# Patient Record
Sex: FEMALE | Race: WHITE | NOT HISPANIC OR LATINO | Employment: OTHER | URBAN - METROPOLITAN AREA
[De-identification: names, ages, dates, MRNs, and addresses within clinical notes are randomized per-mention and may not be internally consistent; named-entity substitution may affect disease eponyms.]

---

## 2019-03-05 LAB
EXTERNAL HIV SCREEN: NORMAL
HCV AB SER-ACNC: NEGATIVE

## 2019-09-12 ENCOUNTER — TELEPHONE (OUTPATIENT)
Dept: NEUROLOGY | Facility: CLINIC | Age: 44
End: 2019-09-12

## 2019-09-13 ENCOUNTER — TRANSCRIBE ORDERS (OUTPATIENT)
Dept: NEUROLOGY | Facility: CLINIC | Age: 44
End: 2019-09-13

## 2019-09-13 DIAGNOSIS — G43.919 INTRACTABLE MIGRAINE WITHOUT STATUS MIGRAINOSUS: Primary | ICD-10-CM

## 2019-09-16 ENCOUNTER — HOSPITAL ENCOUNTER (EMERGENCY)
Facility: HOSPITAL | Age: 44
Discharge: HOME | End: 2019-09-16
Attending: EMERGENCY MEDICINE | Admitting: EMERGENCY MEDICINE
Payer: COMMERCIAL

## 2019-09-16 ENCOUNTER — APPOINTMENT (EMERGENCY)
Dept: RADIOLOGY | Facility: HOSPITAL | Age: 44
End: 2019-09-16
Payer: COMMERCIAL

## 2019-09-16 VITALS
HEIGHT: 63 IN | WEIGHT: 170.86 LBS | RESPIRATION RATE: 15 BRPM | BODY MASS INDEX: 30.27 KG/M2 | HEART RATE: 66 BPM | TEMPERATURE: 98.4 F | SYSTOLIC BLOOD PRESSURE: 92 MMHG | DIASTOLIC BLOOD PRESSURE: 53 MMHG | OXYGEN SATURATION: 98 %

## 2019-09-16 DIAGNOSIS — M54.9 BACK PAIN: ICD-10-CM

## 2019-09-16 DIAGNOSIS — G43.909 MIGRAINE: Primary | ICD-10-CM

## 2019-09-16 LAB
EXT PREG TEST URINE: NEGATIVE
EXT. CONTROL ED NAV: NORMAL

## 2019-09-16 PROCEDURE — 99284 EMERGENCY DEPT VISIT MOD MDM: CPT | Performed by: EMERGENCY MEDICINE

## 2019-09-16 PROCEDURE — 72100 X-RAY EXAM L-S SPINE 2/3 VWS: CPT

## 2019-09-16 PROCEDURE — 81025 URINE PREGNANCY TEST: CPT | Performed by: EMERGENCY MEDICINE

## 2019-09-16 PROCEDURE — 99283 EMERGENCY DEPT VISIT LOW MDM: CPT

## 2019-09-16 PROCEDURE — 72070 X-RAY EXAM THORAC SPINE 2VWS: CPT

## 2019-09-16 PROCEDURE — 96374 THER/PROPH/DIAG INJ IV PUSH: CPT

## 2019-09-16 PROCEDURE — 96375 TX/PRO/DX INJ NEW DRUG ADDON: CPT

## 2019-09-16 PROCEDURE — 72050 X-RAY EXAM NECK SPINE 4/5VWS: CPT

## 2019-09-16 RX ORDER — DIPHENHYDRAMINE HYDROCHLORIDE 50 MG/ML
25 INJECTION INTRAMUSCULAR; INTRAVENOUS ONCE
Status: COMPLETED | OUTPATIENT
Start: 2019-09-16 | End: 2019-09-16

## 2019-09-16 RX ORDER — KETOROLAC TROMETHAMINE 30 MG/ML
15 INJECTION, SOLUTION INTRAMUSCULAR; INTRAVENOUS ONCE
Status: COMPLETED | OUTPATIENT
Start: 2019-09-16 | End: 2019-09-16

## 2019-09-16 RX ORDER — METOCLOPRAMIDE HYDROCHLORIDE 5 MG/ML
10 INJECTION INTRAMUSCULAR; INTRAVENOUS ONCE
Status: COMPLETED | OUTPATIENT
Start: 2019-09-16 | End: 2019-09-16

## 2019-09-16 RX ORDER — CYCLOBENZAPRINE HCL 10 MG
10 TABLET ORAL 2 TIMES DAILY PRN
Qty: 20 TABLET | Refills: 0 | Status: SHIPPED | OUTPATIENT
Start: 2019-09-16 | End: 2019-09-27

## 2019-09-16 RX ORDER — NAPROXEN 500 MG/1
500 TABLET ORAL 2 TIMES DAILY WITH MEALS
Qty: 30 TABLET | Refills: 0 | Status: SHIPPED | OUTPATIENT
Start: 2019-09-16 | End: 2019-09-27

## 2019-09-16 RX ADMIN — KETOROLAC TROMETHAMINE 15 MG: 30 INJECTION, SOLUTION INTRAMUSCULAR at 09:36

## 2019-09-16 RX ADMIN — METOCLOPRAMIDE 10 MG: 5 INJECTION, SOLUTION INTRAMUSCULAR; INTRAVENOUS at 09:38

## 2019-09-16 RX ADMIN — DIPHENHYDRAMINE HYDROCHLORIDE 25 MG: 50 INJECTION, SOLUTION INTRAMUSCULAR; INTRAVENOUS at 09:35

## 2019-09-16 NOTE — DISCHARGE INSTRUCTIONS
Follow up primary care and Neurology, take medications as prescribed for pain/muscle spasm, return to ED for severe or worsening pain, bowel or bladder incontinence, numbness in genital area, inability to walk

## 2019-09-16 NOTE — ED PROVIDER NOTES
History  Chief Complaint   Patient presents with    Back Pain     pt c/o back pain since friday 09/13  also c/o migraine states that she has a hx of migraines and degenerative disc disease     HPI  78-year-old female presents back pain for the past week in addition to a migraine headache  She states she has history degenerative disc disease, chronic back pain and was following up with a neurologist, had MRIs done which were unremarkable  States that she has pain on the left side of her back that radiates down her left leg  No weakness, numbness, saddle anesthesia, bowel or bladder dysfunction  No fevers, chills, IV drug use  No trauma  Has tried turmeric/ibuprofen for her headache/back pain without relief  States that migraine feels like prior migraines with sharp stabbing pain in head, photophobia and nausea  None       Past Medical History:   Diagnosis Date    Bulging lumbar disc     Degenerative disc disease, cervical     pt states entire back    IBS (irritable bowel syndrome)     Migraine     Psychiatric disorder     depression anxiety       Past Surgical History:   Procedure Laterality Date    SHOULDER SURGERY Right        History reviewed  No pertinent family history  I have reviewed and agree with the history as documented  Social History     Tobacco Use    Smoking status: Current Every Day Smoker     Packs/day: 0 50     Types: Cigarettes    Smokeless tobacco: Never Used   Substance Use Topics    Alcohol use: Not Currently    Drug use: Not Currently        Review of Systems   Constitutional: Negative for chills and fever  HENT: Negative for dental problem and ear pain  Eyes: Negative for pain and redness  Respiratory: Negative for cough and shortness of breath  Cardiovascular: Negative for chest pain and palpitations  Gastrointestinal: Negative for abdominal pain and nausea  Endocrine: Negative for polydipsia and polyphagia     Genitourinary: Negative for dysuria and frequency  Musculoskeletal: Positive for back pain  Negative for arthralgias and joint swelling  Skin: Negative for color change and rash  Neurological: Positive for headaches  Negative for dizziness  Psychiatric/Behavioral: Negative for behavioral problems and confusion  All other systems reviewed and are negative  Physical Exam  Physical Exam   Constitutional: She is oriented to person, place, and time  She appears well-developed and well-nourished  No distress  HENT:   Head: Atraumatic  Right Ear: External ear normal    Left Ear: External ear normal    Nose: Nose normal    Eyes: Pupils are equal, round, and reactive to light  Conjunctivae and EOM are normal    Neck: Normal range of motion  Neck supple  No JVD present  Cardiovascular: Normal rate, regular rhythm and normal heart sounds  No murmur heard  Pulmonary/Chest: Effort normal and breath sounds normal  No respiratory distress  She has no wheezes  Abdominal: Soft  Bowel sounds are normal  She exhibits no distension  There is no tenderness  Musculoskeletal: Normal range of motion  She exhibits no edema  TTP left lumbar paraspinal, +SLR on left, no midline TTP, normal strength and sensation in bilateral lower extremities   Neurological: She is alert and oriented to person, place, and time  No cranial nerve deficit  CN II-XII intact grossly, no focal deficits  Normal strength and sensation in b/l upper and lower extremities  Normal FNF and rapid alternating hand movements   Skin: Skin is warm and dry  Capillary refill takes less than 2 seconds  She is not diaphoretic  Psychiatric: She has a normal mood and affect  Her behavior is normal    Nursing note and vitals reviewed        Vital Signs  ED Triage Vitals [09/16/19 0846]   Temperature Pulse Respirations Blood Pressure SpO2   98 4 °F (36 9 °C) 90 16 116/62 100 %      Temp Source Heart Rate Source Patient Position - Orthostatic VS BP Location FiO2 (%)   Oral Monitor Sitting Right arm --      Pain Score       Worst Possible Pain           Vitals:    09/16/19 0846 09/16/19 0958 09/16/19 1033   BP: 116/62 99/58 92/53   Pulse: 90 70 66   Patient Position - Orthostatic VS: Sitting Sitting Lying         Visual Acuity  Visual Acuity      Most Recent Value   L Pupil Size (mm)  4   R Pupil Size (mm)  4          ED Medications  Medications   metoclopramide (REGLAN) injection 10 mg (10 mg Intravenous Given 9/16/19 0938)   diphenhydrAMINE (BENADRYL) injection 25 mg (25 mg Intravenous Given 9/16/19 0935)   ketorolac (TORADOL) injection 15 mg (15 mg Intravenous Given 9/16/19 0936)       Diagnostic Studies  Results Reviewed     Procedure Component Value Units Date/Time    POCT pregnancy, urine [[id number]]  (Normal) Resulted:  09/16/19 0916    Lab Status:  Final result Updated:  09/16/19 0916     EXT PREG TEST UR (Ref: Negative) negative     Control valid                 XR spine thoracic 2 views   Final Result by Lavonne Newton MD (09/16 1000)      No acute osseous abnormality  Workstation performed: [id number]         XR spine lumbar 2 or 3 views injury   Final Result by Lavonne Newton MD (09/16 1002)      No acute osseous abnormality  Degenerative changes as described  Workstation performed: [id number]         XR spine cervical complete 4 or 5 vw non injury   Final Result by Lavonne Newton MD (09/16 1000)      No radiographic evidence of cervical spine fracture or traumatic malalignment  Straightening of the normal cervical lordosis, which may reflect muscle spasm versus positioning  Mild disc degenerative changes at C5-6  Workstation performed: [id number]                    Procedures  Procedures       ED Course                               MDM  41-year-old female past medical history of migraine headaches in addition to chronic back pain presents headache and back pain    Migraine feels like prior migraines, normal neuro exam, no red flags in history, improved with migraine cocktail  Patient requesting x-rays of her entire spine which are negative  No red flags in history, no numbness, weakness, saddle anesthesia, fevers  Will refer for our primary doctor, Neurology, will Rx naproxen and Flexeril  Disposition  Final diagnoses:   Migraine   Back pain     Time reflects when diagnosis was documented in both MDM as applicable and the Disposition within this note     Time User Action Codes Description Comment    9/16/2019 10:32 AM Maritza Jose [A76 443] Migraine     9/16/2019 10:32 AM Denia Ky Noemí [M54 9] Back pain       ED Disposition     ED Disposition Condition Date/Time Comment    Discharge Stable Mon Sep 16, 2019 10:31 AM Nadira discharge to home/self care  Follow-up Information     Follow up With Specialties Details Why Contact Info    Alex Darling MD Internal Medicine Call  for primary care doctor [phone] Neurology Call  for neurologist [address] [place] [phone]            Discharge Medication List as of 9/16/2019 10:34 AM      START taking these medications    Details   cyclobenzaprine (FLEXERIL) 10 mg tablet Take 1 tablet (10 mg total) by mouth 2 (two) times a day as needed for muscle spasms, Starting Mon 9/16/2019, Print      naproxen (NAPROSYN) 500 mg tablet Take 1 tablet (500 mg total) by mouth 2 (two) times a day with meals, Starting Mon 9/16/2019, Print           No discharge procedures on file      ED Provider  Electronically Signed by           Jackie Santos MD  09/16/19 7582

## 2019-09-27 ENCOUNTER — CONSULT (OUTPATIENT)
Dept: PAIN MEDICINE | Facility: CLINIC | Age: 44
End: 2019-09-27
Payer: COMMERCIAL

## 2019-09-27 VITALS
HEART RATE: 76 BPM | DIASTOLIC BLOOD PRESSURE: 68 MMHG | WEIGHT: 164 LBS | HEIGHT: 63 IN | RESPIRATION RATE: 16 BRPM | BODY MASS INDEX: 29.06 KG/M2 | SYSTOLIC BLOOD PRESSURE: 114 MMHG

## 2019-09-27 DIAGNOSIS — M50.120 CERVICAL DISC DISORDER WITH RADICULOPATHY OF MID-CERVICAL REGION: ICD-10-CM

## 2019-09-27 DIAGNOSIS — M51.16 LUMBAR DISC DISEASE WITH RADICULOPATHY: Primary | ICD-10-CM

## 2019-09-27 DIAGNOSIS — M51.26 LUMBAR DISC HERNIATION: ICD-10-CM

## 2019-09-27 PROCEDURE — 99204 OFFICE O/P NEW MOD 45 MIN: CPT | Performed by: ANESTHESIOLOGY

## 2019-09-27 RX ORDER — ALPRAZOLAM 2 MG/1
TABLET ORAL 3 TIMES DAILY PRN
COMMUNITY

## 2019-09-27 RX ORDER — FLUOXETINE HYDROCHLORIDE 20 MG/1
40 CAPSULE ORAL DAILY
COMMUNITY

## 2019-09-27 RX ORDER — IBUPROFEN 200 MG
200 TABLET ORAL DAILY PRN
COMMUNITY
End: 2020-10-23 | Stop reason: SDUPTHER

## 2019-09-27 RX ORDER — PROPRANOLOL/HYDROCHLOROTHIAZID 40 MG-25MG
1500 TABLET ORAL 2 TIMES DAILY
COMMUNITY
End: 2020-03-31 | Stop reason: ALTCHOICE

## 2019-09-27 NOTE — PROGRESS NOTES
Assessment:  1  Lumbar disc disease with radiculopathy  Ambulatory referral to Physical Therapy   2  Lumbar disc herniation  Ambulatory referral to Physical Therapy   3  Cervical disc disorder with radiculopathy of mid-cervical region  Ambulatory referral to Physical Therapy     Plan  This is a 39-year-old female who presents with neck and low back pain  On physical examination, there is no gross motor deficits appreciated  The patient demonstrated minimal affect on  Muscle strength testing  Sensory testing is intact  Her MRI was reviewed with her in detail and all her questions were answered to her satisfaction  Today, we discussed options for pain management  I informed patient that we need to implement a comprehensive approach utilizing physical therapy/water aerobics  In addition, NSAIDs, muscle relaxants, membrane stabilizing agents and alternative regimen should be considered  Patient is currently on ibuprofen as needed which she will continue for inflammation  Regarding muscle relaxant, she was given muscle relaxant in the past and she reports that it made her feel sick  Will hold off on any additional muscle relaxant for now  I will send her to physical therapy /water aerobics 2 to 3 times a week for 6 weeks duration  Upon completion of water aerobics, will see her back in the office in 6 weeks for reassessment  If she continues to complain of neck pain or low back pain with radicular symptoms, I will consider performing a lumbar versus cervical epidural steroid injection  Patient is hesitant regarding injection but she will review it accordingly  I will discuss further next office visit  I provided patient of names of alternative regimen for headaches  These include butterbur, feverfew and Riboflavin supplements  My impressions and treatment recommendations were discussed in detail with the patient who verbalized understanding and had no further questions    Discharge instructions were provided  I personally saw and examined the patient and I agree with the above discussed plan of care  New Medications Ordered This Visit   Medications    Turmeric 500 MG CAPS     Sig: Take by mouth    ibuprofen (MOTRIN) 200 mg tablet     Sig: Take 200 mg by mouth every 6 (six) hours as needed    FLUoxetine (PROzac) 20 mg capsule     Sig: Take 20 mg by mouth daily    ALPRAZolam (XANAX) 2 MG tablet     Sig: Take by mouth daily at bedtime as needed for anxiety       History of Present Illness    Heriberto Seay is a 38 y o  female who presents today for initial consultation regarding neck and low back pain  Of note, patient reports severe pain which she rates 8-10/10 on the pain scale  Her pain is constant, with no typical pattern  Patient further describes pain as burning, cramping, shooting, numbness with pins and needles sensation  She reports neck pain which radiates down her arms bilaterally  She reports low back pain which radiates across the low back and down the hips and down the legs bilaterally  Of note, patient takes ibuprofen 200 mg as needed and tumeric  She recently had a cervical and the lumbosacral spine MRI which demonstrates multilevel disc herniations as well as foraminal narrowing  She reports headaches, dizziness, joint pain and muscle pain  Patient denies bladder and or bowel issues  She denies fever or chills  She has had physical therapy as well as exercise regimen without relief of pain  Of note, patient was seen a neurologist in [place] and was treated for her symptoms  She reports pain on gabapentin in the past without efficacy  She was weaned off accordingly  In addition, patient reports a family history of multiple sclerosis as well as ALS  She states that she is negative for both conditions      I have personally reviewed and/or updated the patient's past medical history, past surgical history, family history, social history, current medications, allergies, and vital signs today  Review of Systems   Constitutional: Negative for fever and unexpected weight change  HENT: Positive for sore throat  Negative for trouble swallowing  Eyes: Positive for pain and visual disturbance  Respiratory: Positive for cough  Negative for shortness of breath and wheezing  Cardiovascular: Negative for chest pain and palpitations  Gastrointestinal: Negative for constipation, diarrhea, nausea and vomiting  Endocrine: Negative for cold intolerance, heat intolerance and polydipsia  Genitourinary: Negative for difficulty urinating and frequency  Musculoskeletal: Positive for arthralgias, back pain, myalgias, neck pain and neck stiffness  Negative for gait problem and joint swelling  Skin: Negative for rash  Neurological: Positive for numbness and headaches  Negative for dizziness, seizures, syncope and weakness  Hematological: Does not bruise/bleed easily  Psychiatric/Behavioral: Positive for dysphoric mood  The patient is nervous/anxious  All other systems reviewed and are negative  Past Medical History:   Diagnosis Date    Bulging lumbar disc     Degenerative disc disease, cervical     pt states entire back    IBS (irritable bowel syndrome)     Migraine     Psychiatric disorder     depression anxiety       Past Surgical History:   Procedure Laterality Date    SHOULDER SURGERY Right        No family history on file      Social History     Occupational History    Not on file   Tobacco Use    Smoking status: Current Every Day Smoker     Packs/day: 0 50     Types: Cigarettes    Smokeless tobacco: Never Used   Substance and Sexual Activity    Alcohol use: Not Currently    Drug use: Not Currently    Sexual activity: Not on file       Current Outpatient Medications on File Prior to Visit   Medication Sig    ALPRAZolam (XANAX) 2 MG tablet Take by mouth daily at bedtime as needed for anxiety    FLUoxetine (PROzac) 20 mg capsule Take 20 mg by mouth daily    ibuprofen (MOTRIN) 200 mg tablet Take 200 mg by mouth every 6 (six) hours as needed    Turmeric 500 MG CAPS Take by mouth    [DISCONTINUED] cyclobenzaprine (FLEXERIL) 10 mg tablet Take 1 tablet (10 mg total) by mouth 2 (two) times a day as needed for muscle spasms    [DISCONTINUED] naproxen (NAPROSYN) 500 mg tablet Take 1 tablet (500 mg total) by mouth 2 (two) times a day with meals     No current facility-administered medications on file prior to visit  No Known Allergies    Physical Exam    /68   Pulse 76   Resp 16   Ht 5' 3" (1 6 m)   Wt 74 4 kg (164 lb)   BMI 29 05 kg/m²     Constitutional: normal, well developed, well nourished, alert, in no distress and non-toxic and no overt pain behavior  Eyes: anicteric  HEENT: grossly intact  Neck: supple, symmetric, trachea midline and no masses   Pulmonary:even and unlabored  Cardiovascular:No edema or pitting edema present  Skin:Normal without rashes or lesions and well hydrated  Psychiatric:Mood and affect appropriate  Neurologic:Cranial Nerves II-XII grossly intact  Musculoskeletal:  SLOW GAIT, difficulty getting up from a seated position      Cervical Spine Exam    Appearance:  Normal lordosis  Palpation/Tenderness:  left cervical paraspinal tenderness  right cervical paraspinal tenderness  Sensory:  no sensory deficits noted  Range of Motion:  Full range of motion with no pain or limitations in flexion, extension, lateral flexion and rotation  Motor Strength:  Left    5/5  Right   5/5  Reflexes:  Left Triceps:  2+   Right Triceps:  2+     Lumbar Spine Exam    Appearance:  Normal lordosis  Palpation/Tenderness:  left lumbar paraspinal tenderness  right lumbar paraspinal tenderness  Sensory:  no sensory deficits noted  Range of Motion:  Full range of motion with no pain or limitations in flexion, extension, lateral flexion and rotation  Motor Strength:  Left foot dorsiflexion:  5/5  Left foot plantar flexion:  5/5  Right foot dorsiflexion:  5/5  Right foot plantar flexion:  5/5  Reflexes:  Left Patellar:  2+   Right Patellar:  2+     Imaging

## 2019-10-08 ENCOUNTER — EVALUATION (OUTPATIENT)
Dept: PHYSICAL THERAPY | Age: 44
End: 2019-10-08
Payer: COMMERCIAL

## 2019-10-08 DIAGNOSIS — M54.12 CERVICAL RADICULOPATHY: Primary | ICD-10-CM

## 2019-10-08 DIAGNOSIS — M54.16 LUMBAR RADICULOPATHY: ICD-10-CM

## 2019-10-08 PROCEDURE — 97113 AQUATIC THERAPY/EXERCISES: CPT | Performed by: PHYSICAL THERAPIST

## 2019-10-08 PROCEDURE — G8979 MOBILITY GOAL STATUS: HCPCS | Performed by: PHYSICAL THERAPIST

## 2019-10-08 PROCEDURE — G8978 MOBILITY CURRENT STATUS: HCPCS | Performed by: PHYSICAL THERAPIST

## 2019-10-08 PROCEDURE — 97162 PT EVAL MOD COMPLEX 30 MIN: CPT | Performed by: PHYSICAL THERAPIST

## 2019-10-08 NOTE — PROGRESS NOTES
PT Evaluation     Today's date: 10/8/2019  Patient name: Elvi Chaudhary  : 1975  MRN: [id number]  Referring provider: Barbara Rivero MD  Dx:   Encounter Diagnosis     ICD-10-CM    1  Cervical radiculopathy M54 12    2  Lumbar radiculopathy M54 16                   Assessment  Assessment details: Elvi Chaudhary is a 38 y o  female who presents with pain, decreased strength, decreased ROM, ambulatory dysfunction and postural  dysfunction  Due to these impairments, Patient has difficulty performing a/iadls  Patient's clinical presentation is consistent with their referring diagnosis of back and neck pain  Patient would benefit from skilled physical therapy to address their aforementioned impairments, improve their level of function and to improve their overall quality of life  Impairments: abnormal gait, abnormal muscle tone, abnormal or restricted ROM, abnormal movement, activity intolerance, impaired balance, impaired physical strength, lacks appropriate home exercise program, pain with function, weight-bearing intolerance, poor posture  and poor body mechanics  Understanding of Dx/Px/POC: good   Prognosis: fair    Goals  ST-3 WEEKS  1  Decrease pain by 2 points on VAS at its worst   2   Increase ROM by > 5 deg in all deficients planes  3   Increase UE/CORE/LE by 1/2 MMT grade in all deficient planes  LT-6 WEEKS  1  Patient to be independent with a/iadls  2  Increase functional activities for leisure and home activities to previous LOF    3  Independent with HEP and/or fitness program     Plan  Patient would benefit from: skilled physical therapy  Planned modality interventions: cryotherapy, electrical stimulation/Russian stimulation, thermotherapy: hydrocollator packs and unattended electrical stimulation  Planned therapy interventions: activity modification, behavior modification, body mechanics training, aquatic therapy, flexibility, functional ROM exercises, home exercise program, IADL retraining, joint mobilization, manual therapy, neuromuscular re-education, patient education, postural training, strengthening, stretching, therapeutic activities and therapeutic exercise  Frequency: 2x week (2-3x week)  Duration in weeks: 12  Treatment plan discussed with: patient        Subjective Evaluation    History of Present Illness  Date of onset: 2019  Mechanism of injury: exacerbated back while rolling in bed, complains of back and leg pain as well as neck pain  Quality of life: good    Pain  Current pain ratin  At best pain ratin  At worst pain ratin  Quality: sharp, tight, knife-like and throbbing  Relieving factors: medications and rest  Aggravating factors: walking, sitting and standing  Progression: no change    Social Support  Steps to enter house: yes  L[facility] in: [facility]  Lives with: spouse      Diagnostic Tests  X-ray: abnormal  MRI studies: abnormal  Treatments  Previous treatment: massage and medication  Patient Goals  Patient goals for therapy: decreased pain, increased motion, increased strength and independence with ADLs/IADLs          Objective     Static Posture     Head  Forward  Thoracic Spine  Hyperkyphosis  Lumbar Spine   Increased lordosis  Palpation   Left   Hypertonic in the erector spinae and upper trapezius  Right   Hypertonic in the erector spinae and upper trapezius  Tenderness     Lumbar Spine  Tenderness in the spinous process       Active Range of Motion   Cervical/Thoracic Spine       Cervical    Flexion: 30 degrees   Extension: 55 degrees      Left lateral flexion: 20 degrees      Right lateral flexion: 20 degrees      Left rotation: 55 degrees  Right rotation: 60 degrees           Lumbar   Flexion: 50 degrees   Extension: 20 degrees     Strength/Myotome Testing     Left Shoulder     Planes of Motion   Flexion: 4-   Extension: 4-   Abduction: 4-   Adduction: 4   External rotation at 0°: 4-   Internal rotation at 0°: 4     Right Shoulder     Planes of Motion   Flexion: 4   Extension: 4   Abduction: 4   Adduction: 4   External rotation at 0°: 4   Internal rotation at 0°: 4     Left Elbow   Flexion: 3+  Extension: 4-    Right Elbow   Flexion: 4  Extension: 4    Left Hip   Planes of Motion   Flexion: 4-  Extension: 4-  Abduction: 4-  Adduction: 4    Right Hip   Planes of Motion   Flexion: 4  Extension: 4  Abduction: 4  Adduction: 4+    Left Knee   Flexion: 4  Extension: 4-    Right Knee   Flexion: 4+  Extension: 4    Left Ankle/Foot   Dorsiflexion: 3+  Plantar flexion: 4-  Inversion: 3+  Eversion: 3+    Right Ankle/Foot   Dorsiflexion: 4  Plantar flexion: 4  Inversion: 4  Eversion: 4    Additional Strength Details  CORE is 3+/5    Tests   Cervical     Left   Positive Spurling's Test A  Lumbar     Left   Positive passive SLR and slump test      Ambulation     Observational Gait   Gait: antalgic, asymmetric and crouched   Decreased walking speed, stride length and left stance time       Functional Assessment        Single Leg Stance   Left: 5 seconds  Right: 10 seconds      Flowsheet Rows      Most Recent Value   PT/OT G-Codes   Current Score  3   Projected Score  36   Assessment Type  Evaluation   G code set  Mobility: Walking & Moving Around   Mobility: Walking and Moving Around Current Status ()  CK   Mobility: Walking and Moving Around Goal Status ()  CJ            Precautions: neck, back pain, depression    Daily Treatment Diary     Manual                                                                                   Exercise Diary  10/8            Water walking 5            Postural training             Gait training             Home exercise pgm/patient education             Wall: t/h raises 1            Hip abd/add 2            Marching 1            squats 1            Knee flex/ext             Step-ups (fwd/bkwd/ss)             SLS (eyes open/closed)             SLS w UE mvmt  AROM/ball toss             Weight shifting UE Noodle work x 4  5            UE AROM             Resistive UE work (paddles, bells, TB)             Core work on noodle (sitting/stdg)             Sit on noodle with movement             Seated on pool bench w proper posture             Ankle df/pf             marching             Hip Ab/add             Knee flex/ext             Deep water mvmt             Deep water tx/stretching 5            Specific self - stretches wall/steps 5                Modalities              whirlpool 5

## 2019-10-10 ENCOUNTER — OFFICE VISIT (OUTPATIENT)
Dept: PHYSICAL THERAPY | Age: 44
End: 2019-10-10
Payer: COMMERCIAL

## 2019-10-10 DIAGNOSIS — M54.12 CERVICAL RADICULOPATHY: Primary | ICD-10-CM

## 2019-10-10 DIAGNOSIS — M54.16 LUMBAR RADICULOPATHY: ICD-10-CM

## 2019-10-10 PROCEDURE — 97113 AQUATIC THERAPY/EXERCISES: CPT

## 2019-10-10 NOTE — PROGRESS NOTES
Daily Note     Today's date: 10/10/2019  Patient name: Archie Rubalcava  : 1975  MRN: [id number]  Referring provider: Susan Young MD  Dx:   Encounter Diagnosis     ICD-10-CM    1  Cervical radiculopathy M54 12    2  Lumbar radiculopathy M54 16        Start Time: 1400  Stop Time: 1500  Total time in clinic (min): 60 minutes    Subjective: pt notes she was really sore and had a lot of pain after initial session  Today she notes pain -8/10  Objective: See treatment diary below  BP taken after session today 121/      Assessment: Tolerated treatment fair  Slow movements while in the pool today  Focused on breathing and posture throughout session  Added UE AROM w/ deep breathing and relaxation  Also added seated ex's for LE's  Pt did have decreased pain while in the water today  Pt educated on pool safety, pool rules and expectations  Pt had a good understanding of all rules and safety guidelines  Patient would benefit from continued PT      Plan: Continue per plan of care        Precautions: neck, back pain, depression    Daily Treatment Diary       Exercise Diary  10/8 10/10           Water walking 5 10           Postural training             Gait training             Home exercise pgm/patient education  5'           Wall: t/h raises 1 1           Hip abd/add 2 2            1           squats 1            Knee flex/ext  1           Step-ups (fwd/bkwd/ss)             SLS (eyes open/closed)             SLS w UE mvmt  AROM/ball toss             Weight shifting             UE Noodle work x 4  5 nt           UE AROM w/ breathing  5'           Resistive UE work (paddles, bells, TB)             Core work on noodle (sitting/stdg)             Sit on noodle with movement             Seated on pool bench w proper posture  2           Ankle df/pf  1                      Hip Ab/add  1           Knee flex/ext  1           Deep water mvmt             Deep water tx/stretching 5 10 Specific self - stretches wall/steps 5 3'               Modalities   10/10           whirlpool 5 10

## 2019-10-15 ENCOUNTER — OFFICE VISIT (OUTPATIENT)
Dept: PHYSICAL THERAPY | Age: 44
End: 2019-10-15
Payer: COMMERCIAL

## 2019-10-15 DIAGNOSIS — M54.16 LUMBAR RADICULOPATHY: ICD-10-CM

## 2019-10-15 DIAGNOSIS — M54.12 CERVICAL RADICULOPATHY: Primary | ICD-10-CM

## 2019-10-15 PROCEDURE — 97113 AQUATIC THERAPY/EXERCISES: CPT | Performed by: PHYSICAL THERAPIST

## 2019-10-15 NOTE — PROGRESS NOTES
Daily Note     Today's date: 10/15/2019  Patient name: Maik Land  : 1975  MRN: [id number]  Referring provider: Saman Drake MD  Dx:   Encounter Diagnosis     ICD-10-CM    1  Cervical radiculopathy M54 12    2  Lumbar radiculopathy M54 16        Start Time: 1300  Stop Time: 1357  Total time in clinic (min): 57 minutes    Subjective: Patient complains of HA+ and back pain -7/10      Objective: See treatment diary below      Assessment: Tolerated treatment well  Patient demonstrated fatigue post treatment, exhibited good technique with therapeutic exercises and would benefit from continued PT, guarded with all movements but notes decreased pain with DEWTX      Plan: Progress treatment as tolerated         Precautions: neck, back pain, depression    Daily Treatment Diary       Exercise Diary  10/8 10/10 10/15          Water walking 5 10 10          Postural training             Gait training             Home exercise pgm/patient education  5'           Wall: t/h raises 1 1 1          Hip abd/add 2 2 2           1 1 1          squats 1  1          Knee flex/ext  1 1          Step-ups (fwd/bkwd/ss)             SLS (eyes open/closed)             SLS w UE mvmt  AROM/ball toss             Weight shifting             UE Noodle work x 4  5 nt           UE AROM w/ breathing  5' 5'          Resistive UE work (paddles, bells, TB)             Core work on noodle (sitting/stdg)             Sit on noodle with movement             Seated on pool bench w proper posture  2 2          Ankle df/pf  1 1          marching  1 1          Hip Ab/add  1 1          Knee flex/ext  1 1          Deep water mvmt             Deep water tx/stretching 5 10 10          Specific self - stretches wall/steps 5 3' 5'              Modalities   10/10 10/15          whirlpool 5 10 10

## 2019-10-18 ENCOUNTER — APPOINTMENT (OUTPATIENT)
Dept: PHYSICAL THERAPY | Age: 44
End: 2019-10-18
Payer: COMMERCIAL

## 2019-10-23 ENCOUNTER — OFFICE VISIT (OUTPATIENT)
Dept: PHYSICAL THERAPY | Age: 44
End: 2019-10-23
Payer: COMMERCIAL

## 2019-10-23 DIAGNOSIS — M54.16 LUMBAR RADICULOPATHY: ICD-10-CM

## 2019-10-23 DIAGNOSIS — M54.12 CERVICAL RADICULOPATHY: Primary | ICD-10-CM

## 2019-10-23 PROCEDURE — 97113 AQUATIC THERAPY/EXERCISES: CPT

## 2019-10-23 NOTE — PROGRESS NOTES
Daily Note     Today's date: 10/23/2019  Patient name: Daniela Peoples  : 1975  MRN: [id number]  Referring provider: Leno Cotton MD  Dx:   Encounter Diagnosis     ICD-10-CM    1  Cervical radiculopathy M54 12    2  Lumbar radiculopathy M54 16        Start Time: 1600  Stop Time: 1700  Total time in clinic (min): 60 minutes    Subjective: pt notes she over did it last week and over the weekend  She c/o sig increase in neck and L shoulder pain  She is c/o radicular symptoms down L UE  " I feel like my shoulder is dislocated " Pain is 9/10 and she states she may go to the ER  After session today pt noted sig decreased in pain and tightness in c/s and L UE 4/10  Objective: See treatment diary below      Assessment: Tolerated treatment fair  Pt presented today in tears w/ sig pain in C/S and radicular pain down L UE  She had burning and numbness down L UE  Sig tightness noted in C/S muscles into B upper traps and rhomboids  Tenderness w/ STMOB to upper traps  Manual therapy in the water today including STMOB, stretching, pressure points, relaxation techniques and diaphragmatic  breathing  No therex today only manual body work and hot tub  After session pt had sig decrease in pain and sig decrease in tightness  Pt had improved posture after session  Patient would benefit from continued PT      Plan: Continue per plan of care        Precautions: neck, back pain, depression    Daily Treatment Diary       Exercise Diary  10/8 10/10 10/15 10/23         Water walking 5 10 10 nt         Postural training             Gait training             Home exercise pgm/patient education  5'  Pt ed 5'         Wall: t/h raises 1 1 1 nt         Hip abd/add 2 2 2 nt         Marching 1 1 1 nt         squats 1  1 nt         Knee flex/ext  1 1 nt         Step-ups (fwd/bkwd/ss)             SLS (eyes open/closed)             SLS w UE mvmt  AROM/ball toss             Weight shifting             UE Noodle work x 4  5 nt UE AROM w/ breathing  5' 5' nt         Resistive UE work (paddles, bells, TB)             Core work on noodle (sitting/stdg)                          Seated on pool bench w proper posture  2 2 nt         Ankle df/pf  1 1 nt         marching  1 1 nt         Hip Ab/add  1 1 nt         Knee flex/ext  1 1 nt         Manual stretching/body work/ c/s tx/STMOB in supine    30'         Deep water tx/stretching 5 10 10 10' in supine/nekdoodle         Specific self - stretches wall/steps 5 3' 5' nt             Modalities   10/10 10/15 10/23         whirlpool 5 10 10 10

## 2019-10-25 ENCOUNTER — APPOINTMENT (OUTPATIENT)
Dept: PHYSICAL THERAPY | Age: 44
End: 2019-10-25
Payer: COMMERCIAL

## 2019-11-01 ENCOUNTER — OFFICE VISIT (OUTPATIENT)
Dept: PHYSICAL THERAPY | Age: 44
End: 2019-11-01
Payer: COMMERCIAL

## 2019-11-01 DIAGNOSIS — M54.12 CERVICAL RADICULOPATHY: Primary | ICD-10-CM

## 2019-11-01 DIAGNOSIS — M54.16 LUMBAR RADICULOPATHY: ICD-10-CM

## 2019-11-01 PROCEDURE — 97113 AQUATIC THERAPY/EXERCISES: CPT

## 2019-11-01 NOTE — PROGRESS NOTES
Daily Note     Today's date: 2019  Patient name: Barb Enrique  : 1975  MRN: [id number]  Referring provider: Yazan Velez MD  Dx:   Encounter Diagnosis     ICD-10-CM    1  Cervical radiculopathy M54 12    2  Lumbar radiculopathy M54 16        Start Time: 1405  Stop Time: 1505  Total time in clinic (min): 60 minutes    Subjective: pt notes she felt really good all day after last session  She notes she tightened back up and today she c/o pain -9/10 but mostly in L LB today  She again states sig relief after session w/ feeling very relaxed  Pain post session noted 5/10  Objective: See treatment diary below      Assessment: Tolerated treatment fairly well today  Pt was able to do more of her ex's today  Continued w/ manual therapy in the water in supine including STMOB to cervical paraspinals and upper traps, C/S PROM, relaxation and breathing and also L/S stretching, gentle in the water using nekdoodle  Tightness continues in B upper traps and into So  Tenderness along L SI and into QL  Improved posture and gait after session today  Pt did have decreased pain again and sig relaxation after session  Patient would benefit from continued PT      Plan: Continue per plan of care        Precautions: neck, back pain, depression    Daily Treatment Diary       Exercise Diary  10/8 10/10 10/15 10/23 11/1        Water walking 5 10 10 nt 10        Postural training             Gait training             Home exercise pgm/patient education  5'  Pt ed 5'         Wall: t/h raises 1 1 1 nt 1        Hip abd/add 2 2 2 nt nt        Marching 1 1 1 nt 1        squats 1  1 nt nt        Knee flex/ext  1 1 nt 1        Step-ups (fwd/bkwd/ss)             SLS (eyes open/closed)             SLS w UE mvmt  AROM/ball toss             Weight shifting             UE Noodle work x 4  5 nt   4'        UE AROM w/ breathing  5' 5' nt 4'        Resistive UE work (paddles, bells, TB)             Core work on noodle (sitting/stdg) Seated on pool bench w proper posture  2 2 nt         Ankle df/pf  1 1 nt         marching  1 1 nt         Hip Ab/add  1 1 nt         Knee flex/ext  1 1 nt         Manual stretching/body work/ c/s tx/STMOB in supine    30' 25'        Deep water tx/stretching 5 10 10 10' in supine/nekdoodle 5'        Specific self - stretches wall/steps 5 3' 5' nt 2'            Modalities   10/10 10/15 10/23 11/1        whirlpool 5 10 10 10 10

## 2019-11-13 ENCOUNTER — OFFICE VISIT (OUTPATIENT)
Dept: PAIN MEDICINE | Facility: CLINIC | Age: 44
End: 2019-11-13
Payer: COMMERCIAL

## 2019-11-13 ENCOUNTER — APPOINTMENT (OUTPATIENT)
Dept: RADIOLOGY | Facility: CLINIC | Age: 44
End: 2019-11-13
Payer: COMMERCIAL

## 2019-11-13 VITALS
SYSTOLIC BLOOD PRESSURE: 100 MMHG | DIASTOLIC BLOOD PRESSURE: 62 MMHG | BODY MASS INDEX: 30.05 KG/M2 | HEIGHT: 63 IN | RESPIRATION RATE: 18 BRPM | WEIGHT: 169.6 LBS | HEART RATE: 86 BPM

## 2019-11-13 DIAGNOSIS — M25.512 PAIN IN JOINT OF LEFT SHOULDER: ICD-10-CM

## 2019-11-13 DIAGNOSIS — M79.18 MYOFASCIAL PAIN SYNDROME: ICD-10-CM

## 2019-11-13 DIAGNOSIS — M54.16 LUMBAR RADICULOPATHY: ICD-10-CM

## 2019-11-13 DIAGNOSIS — M54.12 CERVICAL RADICULOPATHY: Primary | ICD-10-CM

## 2019-11-13 DIAGNOSIS — M50.120 CERVICAL DISC DISORDER WITH RADICULOPATHY OF MID-CERVICAL REGION: ICD-10-CM

## 2019-11-13 PROCEDURE — 73030 X-RAY EXAM OF SHOULDER: CPT

## 2019-11-13 PROCEDURE — 99214 OFFICE O/P EST MOD 30 MIN: CPT | Performed by: ANESTHESIOLOGY

## 2019-11-13 RX ORDER — DIPHENHYDRAMINE HCL 25 MG
25 CAPSULE ORAL EVERY 6 HOURS PRN
COMMUNITY
End: 2022-04-12 | Stop reason: ALTCHOICE

## 2019-11-13 NOTE — PROGRESS NOTES
Assessment:  1  Cervical radiculopathy  MRI cervical spine wo contrast   2  Cervical disc disorder with radiculopathy of mid-cervical region  MRI cervical spine wo contrast   3  Lumbar radiculopathy  MRI lumbar spine wo contrast     Plan: This is a 44-year-old female who presents with follow-up office visit regarding lumbar radiculitis, cervical radiculitis  Patient continues to undergo physical therapy as well as aquatic therapy  She does routine home exercises  X-ray demonstrates multilevel degenerative disc disease with disc space narrowing at C5-C6  Patient's pain radiates down the left arm with neuropathic symptoms  I will order MRI of the lumbar and cervical spine without contrast   Upon completion, I will give her a call to review the results accordingly  Patient may benefit from pain intervention  Patient has limited mobility of the left shoulder  I will order an xray left shoulder  My impressions and treatment recommendations were discussed in detail with the patient who verbalized understanding and had no further questions  Discharge instructions were provided  I personally saw and examined the patient and I agree with the above discussed plan of care  New Medications Ordered This Visit   Medications    diphenhydrAMINE (BENADRYL) 25 mg capsule     Sig: Take 25 mg by mouth every 6 (six) hours as needed for itching       History of Present Illness:  Batsheva Galdamez is a 38 y o  female who presents for a follow up office visit in regards to Neck Pain; Arm Pain (left); Back Pain; and Leg Pain (left)  The patients current symptoms include shooting neck pain with numbness and pins and needles sensation  Patient was sent to physical/aquatic therapy 6 weeks ago  She reports that aquatic/ physical therapy has been helping  Today, she rates her pain 7/10  She takes ibuprofen as needed  No recent changes in health      I have personally reviewed and/or updated the patient's past medical history, past surgical history, family history, social history, current medications, allergies, and vital signs today  Review of Systems   Respiratory: Negative for shortness of breath  Cardiovascular: Negative for chest pain  Gastrointestinal: Negative for constipation, diarrhea, nausea and vomiting  Musculoskeletal: Positive for gait problem  Negative for arthralgias and joint swelling  Skin: Negative for rash  Neurological: Negative for dizziness, seizures and weakness  All other systems reviewed and are negative  There is no problem list on file for this patient        Past Medical History:   Diagnosis Date    Bulging lumbar disc     Degenerative disc disease, cervical     pt states entire back    IBS (irritable bowel syndrome)     Migraine     Psychiatric disorder     depression anxiety       Past Surgical History:   Procedure Laterality Date    SHOULDER SURGERY Right        Family History   Problem Relation Age of Onset   [de-identified] ALS Mother     Multiple sclerosis Father     Multiple sclerosis Sister        Social History     Occupational History    Not on file   Tobacco Use    Smoking status: Current Every Day Smoker     Packs/day: 0 50     Types: Cigarettes    Smokeless tobacco: Never Used   Substance and Sexual Activity    Alcohol use: Yes     Frequency: 2-4 times a month     Drinks per session: 1 or 2    Drug use: Not on file    Sexual activity: Not on file       Current Outpatient Medications on File Prior to Visit   Medication Sig    ALPRAZolam (XANAX) 2 MG tablet Take by mouth daily at bedtime as needed for anxiety    diphenhydrAMINE (BENADRYL) 25 mg capsule Take 25 mg by mouth every 6 (six) hours as needed for itching    FLUoxetine (PROzac) 20 mg capsule Take 20 mg by mouth daily    ibuprofen (MOTRIN) 200 mg tablet Take 200 mg by mouth every 6 (six) hours as needed    Turmeric 500 MG CAPS Take by mouth     No current facility-administered medications on file prior to visit  No Known Allergies    Physical Exam:    /62   Pulse 86   Resp 18   Ht 5' 3" (1 6 m)   Wt 76 9 kg (169 lb 9 6 oz)   BMI 30 04 kg/m²     Constitutional:normal, well developed, well nourished, alert, in no distress and non-toxic and no overt pain behavior    Eyes:anicteric  HEENT:grossly intact  Neck:supple, symmetric, trachea midline and no masses   Pulmonary:even and unlabored  Cardiovascular:No edema or pitting edema present  Skin:Normal without rashes or lesions and well hydrated  Psychiatric:Mood and affect appropriate  Neurologic:Cranial Nerves II-XII grossly intact  Musculoskeletal:normal    Cervical Spine Exam    Appearance:  Normal lordosis  Palpation/Tenderness:  left cervical paraspinal tenderness  right cervical paraspinal tenderness  Sensory:  no sensory deficits noted  Range of Motion:  Extension:  Minimally limited  with pain  Motor Strength:  Left    5/5  Right   5/5  Reflexes:  Left Triceps:  2+   Right Triceps:  2+         Imaging

## 2019-11-18 ENCOUNTER — APPOINTMENT (OUTPATIENT)
Dept: PHYSICAL THERAPY | Age: 44
End: 2019-11-18
Payer: COMMERCIAL

## 2019-11-19 ENCOUNTER — TELEPHONE (OUTPATIENT)
Dept: RADIOLOGY | Facility: CLINIC | Age: 44
End: 2019-11-19

## 2019-11-19 NOTE — TELEPHONE ENCOUNTER
----- Message from Nabeel Oviedo MD sent at 11/18/2019 10:44 AM EST -----  Please advised patient of results    Normal study

## 2019-11-19 NOTE — TELEPHONE ENCOUNTER
S/W pt and advised of below results  Is scheduled for MRI's Cervical and Lumbar on 11/27/19  Advised pt office would be in touch with those results  Pt going to Aquatic therapy which she states is helping

## 2019-11-21 ENCOUNTER — OFFICE VISIT (OUTPATIENT)
Dept: PHYSICAL THERAPY | Age: 44
End: 2019-11-21
Payer: COMMERCIAL

## 2019-11-21 DIAGNOSIS — M54.16 LUMBAR RADICULOPATHY: ICD-10-CM

## 2019-11-21 DIAGNOSIS — M54.12 CERVICAL RADICULOPATHY: Primary | ICD-10-CM

## 2019-11-21 PROCEDURE — 97113 AQUATIC THERAPY/EXERCISES: CPT

## 2019-11-21 NOTE — PROGRESS NOTES
Daily Note     Today's date: 2019  Patient name: Tristen Linares  : 1975  MRN: [id number]  Referring provider: Lola Tineo MD  Dx:   Encounter Diagnosis     ICD-10-CM    1  Cervical radiculopathy M54 12    2  Lumbar radiculopathy M54 16        Start Time: 1400  Stop Time: 1505  Total time in clinic (min): 65 minutes    Subjective: pt notes she hasn't been able to get to PT for personal reasons  She states today she is tight and sore  Pain today 7-8/10 in her neck and LB  Pt noted relief after session -5/10  Objective: See treatment diary below      Assessment: Tolerated treatment fairly well today  Sig tightness in cervical spine w/ tenderness to B upper traps and SO region  Tightness in L/S w/ tenderness to palpate along Lumbar paraspinals  Relief noted after session today, w/ positive response to manual therapy in the water  Patient would benefit from continued PT      Plan: Continue per plan of care        Precautions: neck, back pain, depression    Daily Treatment Diary       Exercise Diary  10/8 10/10 10/15 10/23 11/1 11/21       Water walking 5 10 10 nt 10 10       Postural training             Gait training             Home exercise pgm/patient education  5'  Pt ed 5'         Wall: t/h raises 1 1 1 nt 1 1       Hip abd/add 2 2 2 nt nt nt       Marching 1 1 1 nt 1 1       squats 1  1 nt nt nt       Knee flex/ext  1 1 nt 1 1       Step-ups (fwd/bkwd/ss)             SLS (eyes open/closed)             SLS w UE mvmt  AROM/ball toss             Weight shifting             UE Noodle work x 4  5 nt   4' 4       UE AROM w/ breathing  5' 5' nt 4' 4       Resistive UE work (paddles, bells, TB)             Core work on noodle (sitting/stdg)                          Seated on pool bench w proper posture  2 2 nt         Ankle df/pf  1 1 nt  1       marching  1 1 nt  1       Hip Ab/add  1 1 nt  1       Knee flex/ext  1 1 nt  1       Manual stretching/body work/ c/s tx/STMOB in supine    30' 25' 20 Deep water tx/stretching 5 10 10 10' in supine/nekdoodle 5' 5       Specific self - stretches wall/steps 5 3' 5' nt 2' 2           Modalities   10/10 10/15 10/23 11/1 11/21       whirlpool 5 10 10 10 10 10

## 2019-11-25 ENCOUNTER — OFFICE VISIT (OUTPATIENT)
Dept: INTERNAL MEDICINE CLINIC | Facility: CLINIC | Age: 44
End: 2019-11-25
Payer: COMMERCIAL

## 2019-11-25 ENCOUNTER — OFFICE VISIT (OUTPATIENT)
Dept: PHYSICAL THERAPY | Age: 44
End: 2019-11-25
Payer: COMMERCIAL

## 2019-11-25 ENCOUNTER — APPOINTMENT (OUTPATIENT)
Dept: LAB | Facility: CLINIC | Age: 44
End: 2019-11-25
Payer: COMMERCIAL

## 2019-11-25 VITALS
HEART RATE: 82 BPM | SYSTOLIC BLOOD PRESSURE: 118 MMHG | BODY MASS INDEX: 30.33 KG/M2 | HEIGHT: 63 IN | DIASTOLIC BLOOD PRESSURE: 78 MMHG | WEIGHT: 171.2 LBS | OXYGEN SATURATION: 98 %

## 2019-11-25 DIAGNOSIS — M54.12 CERVICAL RADICULOPATHY: ICD-10-CM

## 2019-11-25 DIAGNOSIS — M54.12 CERVICAL RADICULOPATHY: Primary | ICD-10-CM

## 2019-11-25 DIAGNOSIS — F33.42 RECURRENT MAJOR DEPRESSIVE DISORDER, IN FULL REMISSION (HCC): ICD-10-CM

## 2019-11-25 DIAGNOSIS — E78.2 MIXED HYPERLIPIDEMIA: ICD-10-CM

## 2019-11-25 DIAGNOSIS — L71.9 ROSACEA: Primary | ICD-10-CM

## 2019-11-25 DIAGNOSIS — M54.16 LUMBAR RADICULOPATHY: ICD-10-CM

## 2019-11-25 DIAGNOSIS — F41.9 ANXIETY: Chronic | ICD-10-CM

## 2019-11-25 PROBLEM — F33.9 DEPRESSION, MAJOR, RECURRENT (HCC): Status: ACTIVE | Noted: 2019-11-25

## 2019-11-25 PROBLEM — F33.9 DEPRESSION, MAJOR, RECURRENT (HCC): Chronic | Status: ACTIVE | Noted: 2019-11-25

## 2019-11-25 PROBLEM — F07.81 POST CONCUSSION SYNDROME: Status: RESOLVED | Noted: 2019-11-25 | Resolved: 2019-11-25

## 2019-11-25 PROBLEM — F07.81 POST CONCUSSION SYNDROME: Status: ACTIVE | Noted: 2019-11-25

## 2019-11-25 LAB
ALBUMIN SERPL BCP-MCNC: 3.8 G/DL (ref 3.5–5)
ALP SERPL-CCNC: 50 U/L (ref 46–116)
ALT SERPL W P-5'-P-CCNC: 20 U/L (ref 12–78)
ANION GAP SERPL CALCULATED.3IONS-SCNC: 4 MMOL/L (ref 4–13)
AST SERPL W P-5'-P-CCNC: 16 U/L (ref 5–45)
BILIRUB SERPL-MCNC: 0.28 MG/DL (ref 0.2–1)
BUN SERPL-MCNC: 8 MG/DL (ref 5–25)
CALCIUM SERPL-MCNC: 9.3 MG/DL (ref 8.3–10.1)
CHLORIDE SERPL-SCNC: 102 MMOL/L (ref 100–108)
CHOLEST SERPL-MCNC: 318 MG/DL (ref 50–200)
CO2 SERPL-SCNC: 30 MMOL/L (ref 21–32)
CREAT SERPL-MCNC: 0.81 MG/DL (ref 0.6–1.3)
GFR SERPL CREATININE-BSD FRML MDRD: 88 ML/MIN/1.73SQ M
GLUCOSE P FAST SERPL-MCNC: 82 MG/DL (ref 65–99)
HDLC SERPL-MCNC: 42 MG/DL
LDLC SERPL CALC-MCNC: 211 MG/DL (ref 0–100)
NONHDLC SERPL-MCNC: 276 MG/DL
POTASSIUM SERPL-SCNC: 4.6 MMOL/L (ref 3.5–5.3)
PROT SERPL-MCNC: 7.8 G/DL (ref 6.4–8.2)
SODIUM SERPL-SCNC: 136 MMOL/L (ref 136–145)
TRIGL SERPL-MCNC: 324 MG/DL

## 2019-11-25 PROCEDURE — 80061 LIPID PANEL: CPT

## 2019-11-25 PROCEDURE — 36415 COLL VENOUS BLD VENIPUNCTURE: CPT

## 2019-11-25 PROCEDURE — 97113 AQUATIC THERAPY/EXERCISES: CPT

## 2019-11-25 PROCEDURE — 99204 OFFICE O/P NEW MOD 45 MIN: CPT | Performed by: INTERNAL MEDICINE

## 2019-11-25 PROCEDURE — 80053 COMPREHEN METABOLIC PANEL: CPT

## 2019-11-25 NOTE — PATIENT INSTRUCTIONS
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
## 2019-11-25 NOTE — PROGRESS NOTES
Daily Note     Today's date: 2019  Patient name: Gordo Barajas  : 1975  MRN: [id number]  Referring provider: Chastity Laird MD  Dx:   Encounter Diagnosis     ICD-10-CM    1  Cervical radiculopathy M54 12    2  Lumbar radiculopathy M54 16        Start Time: 1300  Stop Time: 1400  Total time in clinic (min): 60 minutes    Subjective: pt notes pain today 7/10 and 4/10 after session  She noted increased soreness and pain after last session for 2 days  She does feel looser in  Her neck now  "what you're doing is really helping"  Objective: See treatment diary below      Assessment: Tolerated treatment fairly well today  Pt continues w/ high pain levels upon entering the pool but does leave w/ decrease pain and more movement as well as feeling more relaxed  Decreased tenderness to SO region and upper traps today w/ manual work  Improved L/S spine mobility after manual work  Patient would benefit from continued PT      Plan: Continue per plan of care        Precautions: neck, back pain, depression    Daily Treatment Diary       Exercise Diary  10/8 10/10 10/15 10/23 11/1 11/21 11/25      Water walking 5 10 10 nt 10 10 10      Postural training             Gait training             Home exercise pgm/patient education  5'  Pt ed 5'         Wall: t/h raises 1 1 1 nt 1 1 1      Hip abd/add 2 2 2 nt nt nt 1      Marching 1 1 1 nt 1 1 1      squats 1  1 nt nt nt nt      Knee flex/ext  1 1 nt 1 1 nt      Step-ups (fwd/bkwd/ss)             SLS (eyes open/closed)             SLS w UE mvmt  AROM/ball toss             Weight shifting             UE Noodle work x 4  5 nt   4' 4 nt      UE AROM w/ breathing  5' 5' nt 4' 4 4      Resistive UE work (paddles, bells, TB)             Core work on noodle (sitting/stdg)                          Seated on pool bench w proper posture  2 2 nt         Ankle df/pf  1 1 nt  1 1      marching  1 1 nt  1 1      Hip Ab/add  1 1 nt  1 1      Knee flex/ext  1 1 nt  1 1 Manual stretching/body work/ c/s tx/STMOB in supine    30' 25' 20 20      Deep water tx/stretching 5 10 10 10' in supine/nekdoodle 5' 5 5'      Specific self - stretches wall/steps 5 3' 5' nt 2' 2 2          Modalities   10/10 10/15 10/23 11/1 11/21 11/22      whirlpool 5 10 10 10 10 10 10

## 2019-11-26 ENCOUNTER — TELEPHONE (OUTPATIENT)
Dept: INTERNAL MEDICINE CLINIC | Facility: CLINIC | Age: 44
End: 2019-11-26

## 2019-11-26 DIAGNOSIS — E78.2 MIXED HYPERLIPIDEMIA: Primary | Chronic | ICD-10-CM

## 2019-11-26 DIAGNOSIS — E78.2 MIXED HYPERLIPIDEMIA: Chronic | ICD-10-CM

## 2019-11-26 RX ORDER — ROSUVASTATIN CALCIUM 10 MG/1
TABLET, COATED ORAL
Qty: 90 TABLET | Refills: 3 | Status: SHIPPED | OUTPATIENT
Start: 2019-11-26 | End: 2020-05-11

## 2019-11-26 RX ORDER — ROSUVASTATIN CALCIUM 10 MG/1
10 TABLET, COATED ORAL DAILY
Qty: 30 TABLET | Refills: 5 | Status: SHIPPED | OUTPATIENT
Start: 2019-11-26 | End: 2019-11-26 | Stop reason: SDUPTHER

## 2019-11-26 NOTE — TELEPHONE ENCOUNTER
----- Message from Fredrick Calvin DO sent at 11/26/2019  6:41 AM EST -----  Cholesterol is extremely high  At her current levels, she needs to be on medication  I will send to pharmacy

## 2019-11-27 ENCOUNTER — HOSPITAL ENCOUNTER (OUTPATIENT)
Dept: MRI IMAGING | Facility: CLINIC | Age: 44
Discharge: HOME | End: 2019-11-27
Payer: COMMERCIAL

## 2019-11-27 DIAGNOSIS — M54.12 CERVICAL RADICULOPATHY: ICD-10-CM

## 2019-11-27 DIAGNOSIS — M54.16 LUMBAR RADICULOPATHY: ICD-10-CM

## 2019-11-27 DIAGNOSIS — M50.120 CERVICAL DISC DISORDER WITH RADICULOPATHY OF MID-CERVICAL REGION: ICD-10-CM

## 2019-11-27 PROCEDURE — 72148 MRI LUMBAR SPINE W/O DYE: CPT

## 2019-11-27 PROCEDURE — 72141 MRI NECK SPINE W/O DYE: CPT

## 2019-11-29 ENCOUNTER — OFFICE VISIT (OUTPATIENT)
Dept: PHYSICAL THERAPY | Age: 44
End: 2019-11-29
Payer: COMMERCIAL

## 2019-11-29 DIAGNOSIS — M54.12 CERVICAL RADICULOPATHY: Primary | ICD-10-CM

## 2019-11-29 DIAGNOSIS — M54.16 LUMBAR RADICULOPATHY: ICD-10-CM

## 2019-11-29 PROCEDURE — 97113 AQUATIC THERAPY/EXERCISES: CPT

## 2019-11-29 NOTE — PROGRESS NOTES
Daily Note     Today's date: 2019  Patient name: Courtney Drake  : 1975  MRN: [id number]  Referring provider: Nina Baez MD  Dx:   Encounter Diagnosis     ICD-10-CM    1  Cervical radiculopathy M54 12    2  Lumbar radiculopathy M54 16        Start Time: 1500  Stop Time: 1600  Total time in clinic (min): 60 minutes    Subjective: pt notes pain today -8/10 and notes decreased pain after session, 5/10  She notes PT is really helping but she has a lot going on and feels tight w/ stress  She c/o having a headache today  Pt notes she had a slip today outside but landed on the ground  She just feels sore now  Objective: See treatment diary below      Assessment: Tolerated treatment fairly well  Stiffness in LB and tenderness to upper traps  Pt can relax for manual therapy w/ VCing  Decreased pain noted after session w/ improved posture and gait  Patient would benefit from continued PT      Plan: Continue per plan of care        Precautions: neck, back pain, depression    Daily Treatment Diary       Exercise Diary  10/8 10/10 10/15 10/23 11/1 11/21 11/25 11/29     Water walking 5 10 10 nt 10 10 10 10     Postural training             Gait training             Home exercise pgm/patient education  5'  Pt ed 5'         Wall: t/h raises 1 1 1 nt 1 1 1 1     Hip abd/add 2 2 2 nt nt nt 1 1     Marching 1 1 1 nt 1 1 1 1     squats 1  1 nt nt nt nt      Knee flex/ext  1 1 nt 1 1 nt 1     Step-ups (fwd/bkwd/ss)             SLS (eyes open/closed)             SLS w UE mvmt  AROM/ball toss             Weight shifting             UE Noodle work x 4  5 nt   4' 4 nt 4     UE AROM w/ breathing  5' 5' nt 4' 4 4 5     Resistive UE work (paddles, bells, TB)             Core work on noodle (sitting/stdg)                          Seated on pool bench w proper posture  2 2 nt         Ankle df/pf  1 1 nt  1 1 1     marching  1 1 nt  1 1 1     Hip Ab/add  1 1 nt  1 1 1     Knee flex/ext  1 1 nt  1 1 1     Manual stretching/body work/ c/s tx/STMOB in supine    30' 25' 20 20 20     Deep water tx/stretching 5 10 10 10' in supine/nekdoodle 5' 5 5' 5     Specific self - stretches wall/steps 5 3' 5' nt 2' 2 2 2         Modalities   10/10 10/15 10/23 11/1 11/21 11/22 11/29     whirlpool 5 10 10 10 10 10 10 10

## 2019-12-03 ENCOUNTER — TELEPHONE (OUTPATIENT)
Dept: PAIN MEDICINE | Facility: MEDICAL CENTER | Age: 44
End: 2019-12-03

## 2019-12-03 NOTE — TELEPHONE ENCOUNTER
Pt called stating that DR Becca Awad called and she missed it  Pt would like for Dr Becca Awad to call her back when he is available      Pt can be reached at [phone]

## 2019-12-04 ENCOUNTER — OFFICE VISIT (OUTPATIENT)
Dept: DERMATOLOGY | Facility: CLINIC | Age: 44
End: 2019-12-04
Payer: COMMERCIAL

## 2019-12-04 DIAGNOSIS — I78.1 SPIDER TELANGIECTASIA: ICD-10-CM

## 2019-12-04 DIAGNOSIS — Z13.89 SCREENING FOR SKIN CONDITION: ICD-10-CM

## 2019-12-04 DIAGNOSIS — H02.66 XANTHELASMA OF EYELID, BILATERAL: Primary | ICD-10-CM

## 2019-12-04 DIAGNOSIS — L71.9 ROSACEA: ICD-10-CM

## 2019-12-04 DIAGNOSIS — H02.63 XANTHELASMA OF EYELID, BILATERAL: Primary | ICD-10-CM

## 2019-12-04 DIAGNOSIS — D18.01 CHERRY ANGIOMA: ICD-10-CM

## 2019-12-04 DIAGNOSIS — L30.4 INTERTRIGO: ICD-10-CM

## 2019-12-04 PROCEDURE — 99204 OFFICE O/P NEW MOD 45 MIN: CPT | Performed by: DERMATOLOGY

## 2019-12-04 RX ORDER — METRONIDAZOLE 7.5 MG/G
GEL TOPICAL DAILY
Qty: 45 G | Refills: 3 | Status: SHIPPED | OUTPATIENT
Start: 2019-12-04 | End: 2021-02-17

## 2019-12-04 NOTE — PATIENT INSTRUCTIONS
xanthelasma related to her underlying lipid abnormality follow-up per Dr Germaine Parkinson probably irritant related will go ahead treat with hydrocortisone cream over-the-counter and see if this will help to control this  Rosacea we discussed the concept of this process will treat with MetroGel daily to see if we get this under control  Spider telangiectasia we discussed removal of this lesion if it does not seem to resolve  Cherry angioma Patient reasurred these are normal growths we acquire with age no treatment needed    Screening for Dermatologic Disorders: Nothing else of concern noted on complete exam follow up in 1 year

## 2019-12-04 NOTE — PROGRESS NOTES
[facility] DERMATOLOGY  [address] [place] [address] [address] [id number]  [phone]  [phone]     MRN: [id number] : 1975  Encounter: [id number]  Patient Information: Libertad Benitez  Chief complaint:  Lesions and overall checkup    History of present illness:  69-year-old female without previous any history of skin concerns presents for overall checkup concerned regarding spot under her eyes also red bumps on her face rosacea also red spots on the chest and back and irritation on her breast no other concerns noted  Past Medical History:   Diagnosis Date    Anxiety     Cervical radiculopathy     Degenerative disc disease, cervical     pt states entire back    Depression     IBS (irritable bowel syndrome)     Lumbar radiculopathy     Migraine     Post concussion syndrome 2019     Past Surgical History:   Procedure Laterality Date    SHOULDER SURGERY Right      Social History   Social History     Substance and Sexual Activity   Alcohol Use Yes    Frequency: 2-4 times a month    Drinks per session: 1 or 2     Social History     Substance and Sexual Activity   Drug Use Never     Social History     Tobacco Use   Smoking Status Current Every Day Smoker    Packs/day: 0 50    Types: Cigarettes   Smokeless Tobacco Never Used     Family History   Problem Relation Age of Onset    ALS Mother     Multiple sclerosis Father     Multiple sclerosis Sister      Meds/Allergies   No Known Allergies    Meds:  Prior to Admission medications    Medication Sig Start Date End Date Taking?  Authorizing Provider   ALPRAZolam [place]off [place]) 2 MG tablet Take by mouth daily at bedtime as needed for anxiety   Yes Historical Provider, MD   diphenhydrAMINE (BENADRYL) 25 mg capsule Take 25 mg by mouth every 6 (six) hours as needed for itching   Yes Historical Provider, MD   FLUoxetine (PROzac) 20 mg capsule Take 20 mg by mouth daily   Yes Historical Provider, MD   ibuprofen (MOTRIN) 200 mg tablet Take 200 mg by mouth every 6 (six) hours as needed   Yes Historical Provider, MD   rosuvastatin (CRESTOR) 10 MG tablet TAKE 1 TABLET BY MOUTH DAILY 11/26/19  Yes Pedro Medeiros [facility],    Turmeric 500 MG CAPS Take 500 mg by mouth 6 (six) times a day    Yes Historical Provider, MD       Subjective:     Review of Systems:    General: negative for - chills, fatigue, fever,  weight gain or weight loss  Psychological: negative for - anxiety, behavioral disorder, concentration difficulties, decreased libido, depression, irritability, memory difficulties, mood swings, sleep disturbances or suicidal ideation  ENT: negative for - hearing difficulties , nasal congestion, nasal discharge, oral lesions, sinus pain, sneezing, sore throat  Allergy and Immunology: negative for - hives, insect bite sensitivity,  Hematological and Lymphatic: negative for - bleeding problems, blood clots,bruising, swollen lymph nodes  Endocrine: negative for - hair pattern changes, hot flashes, malaise/lethargy, mood swings, palpitations, polydipsia/polyuria, skin changes, temperature intolerance or unexpected weight change  Respiratory: negative for - cough, hemoptysis, orthopnea, shortness of breath, or wheezing  Cardiovascular: negative for - chest pain, dyspnea on exertion, edema,  Gastrointestinal: negative for - abdominal pain, nausea/vomiting  Genito-Urinary: negative for - dysuria, incontinence, irregular/heavy menses or urinary frequency/urgency  Musculoskeletal: negative for - gait disturbance, joint pain, joint stiffness, joint swelling, muscle pain, muscular weakness  Dermatological:  As in HPI  Neurological: negative for confusion, dizziness, headaches, impaired coordination/balance, memory loss, numbness/tingling, seizures, speech problems, tremors or weakness       Objective: There were no vitals taken for this visit      Physical Exam:    General Appearance:    Alert, cooperative, no distress   Head:    Normocephalic, without obvious abnormality, atraumatic           Skin:   A full skin exam was performed including scalp, head scalp, eyes, ears, nose, lips, neck, chest, axilla, abdomen, back, buttocks, bilateral upper extremities, bilateral lower extremities, hands, feet, fingers, toes, fingernails, and toenails yellowishmacule noted in the infraorbital area left more notable than right spider telangiectasias noted nose and cheeks papules noted on the face with minimal erythema numerous red papules noted on chest and back nothing else remarkable noted on exam except slight erythema scaling on the right inframammary area     Assessment:     1  Xanthelasma of eyelid, bilateral     2  Intertrigo     3  Spider telangiectasia     4  Cherry angioma     5  Screening for skin condition     6  Rosacea           Plan:   xanthelasma related to her underlying lipid abnormality follow-up per Dr Ritika Nolen probably irritant related will go ahead treat with hydrocortisone cream over-the-counter and see if this will help to control this  Rosacea we discussed the concept of this process will treat with MetroGel daily to see if we get this under control  Spider telangiectasia we discussed removal of this lesion if it does not seem to resolve  Cherry angioma Patient reasurred these are normal growths we acquire with age no treatment needed  Screening for Dermatologic Disorders: Nothing else of concern noted on complete exam follow up in 1 year           Cleveland Styles MD  12/4/2019,10:27 AM    Portions of the record may have been created with voice recognition software   Occasional wrong word or "sound a like" substitutions may have occurred due to the inherent limitations of voice recognition software   Read the chart carefully and recognize, using context, where substitutions have occurred

## 2019-12-05 ENCOUNTER — APPOINTMENT (OUTPATIENT)
Dept: PHYSICAL THERAPY | Age: 44
End: 2019-12-05
Payer: COMMERCIAL

## 2019-12-05 NOTE — TELEPHONE ENCOUNTER
I reached out to patient again, no response  Please when she calls schedule office visit to see me in the office to review MRI results

## 2019-12-06 NOTE — TELEPHONE ENCOUNTER
S/w pt returning call to office; representative could only find next available to be 1/10/20, therefore could not be scheduled      Pt is crying and frightened of what's next; please assist    Cb#  [phone]

## 2019-12-06 NOTE — TELEPHONE ENCOUNTER
See below  Did you want to try this pt again regarding results or is there a message for me to relay?

## 2019-12-09 ENCOUNTER — OFFICE VISIT (OUTPATIENT)
Dept: PAIN MEDICINE | Facility: CLINIC | Age: 44
End: 2019-12-09
Payer: COMMERCIAL

## 2019-12-09 VITALS
HEIGHT: 64 IN | DIASTOLIC BLOOD PRESSURE: 78 MMHG | RESPIRATION RATE: 20 BRPM | BODY MASS INDEX: 28.58 KG/M2 | SYSTOLIC BLOOD PRESSURE: 112 MMHG | HEART RATE: 72 BPM | WEIGHT: 167.4 LBS

## 2019-12-09 DIAGNOSIS — M50.120 CERVICAL DISC DISORDER WITH RADICULOPATHY OF MID-CERVICAL REGION: Primary | ICD-10-CM

## 2019-12-09 DIAGNOSIS — M54.16 LUMBAR RADICULOPATHY: Chronic | ICD-10-CM

## 2019-12-09 DIAGNOSIS — M54.12 CERVICAL RADICULOPATHY: Chronic | ICD-10-CM

## 2019-12-09 PROCEDURE — 99214 OFFICE O/P EST MOD 30 MIN: CPT | Performed by: ANESTHESIOLOGY

## 2019-12-09 NOTE — PROGRESS NOTES
Assessment:  1  Cervical disc disorder with radiculopathy of mid-cervical region  FL spine and pain procedure    EMG 2 Limb Upper Extremity   2  Cervical radiculopathy  FL spine and pain procedure    EMG 2 Limb Upper Extremity   3  Lumbar radiculopathy       Plan: This is a 41-year-old female who presents today for follow-up with neck pain and low back pain which is multifactorial in origin  MRI of the cervical and lumbosacral spine was reviewed with patient in detail and all her questions were answered to her satisfaction  Patient does have some disc herniations and protrusions in the cervical and the lumbosacral spine  The patient's pain persists despite time, relative rest, activity modification and PT/water therapy  I believe that she would benefit from cervical epidural steroid injection to diminish any inflammatory component of her pain  We will initially use an interlaminar approach  The injection may need to be repeated or alternate approach utilized based on the degree of pain relief following the initial injection  In the office today, we reviewed the nature of the patient's pathology in depth using diagrams and models  We discussed the approach we would use for the epidural steroid injection and provided literature for home review  The patient understands the risks associated with the procedure including bleeding, infection, tissue reaction, allergic reaction, spinal headache and paralysis and provided written and verbal consent in the office today  My impressions and treatment recommendations were discussed in detail with the patient who verbalized understanding and had no further questions  Discharge instructions were provided  I personally saw and examined the patient and I agree with the above discussed plan of care  History of Present Illness:  Gordo Barajas is a 38 y o  female who presents for a follow up office visit in regards to Back Pain; Neck Pain;  Shoulder Pain (mostly on the left side); Arm Pain (left numbness & tingling); Hand Pain (left numbness & tingling); Leg Pain (left side pain); and Foot Pain (left side pain)  The patients current symptoms include constant, burning, sharp, throbbing pain which shooting sensation and numbness and pins and needles sensation  Patient reports 8/10 pain  Patient has been undergoing aquatic therapy as well as massage therapy as well  Patient recently had MRIs of the cervical and lumbosacral spine without contrast and is here today to review the results accordingly  No recent changes in health  Patient continues to be maintained on NSAIDs as needed  I have personally reviewed and/or updated the patient's past medical history, past surgical history, family history, social history, current medications, allergies, and vital signs today  Review of Systems   Respiratory: Negative for shortness of breath  Cardiovascular: Negative for chest pain  Gastrointestinal: Negative for constipation, diarrhea, nausea and vomiting  Musculoskeletal: Positive for gait problem and joint swelling  Negative for arthralgias and myalgias  Skin: Negative for rash  Neurological: Positive for dizziness and numbness  Negative for seizures and weakness  All other systems reviewed and are negative        Patient Active Problem List   Diagnosis    Cervical radiculopathy    Cervical disc disorder with radiculopathy of mid-cervical region    Lumbar radiculopathy    Depression, major, recurrent ([facility] [address] )    Anxiety    Mixed hyperlipidemia       Past Medical History:   Diagnosis Date    Anxiety     Cervical radiculopathy     Degenerative disc disease, cervical     pt states entire back    Depression     IBS (irritable bowel syndrome)     Lumbar radiculopathy     Migraine     Post concussion syndrome 11/25/2019       Past Surgical History:   Procedure Laterality Date    SHOULDER SURGERY Right        Family History   Problem Relation Age of Onset    ALS Mother     Multiple sclerosis Father     Multiple sclerosis Sister        Social History     Occupational History    Not on file   Tobacco Use    Smoking status: Current Every Day Smoker     Packs/day: 0 50     Types: Cigarettes    Smokeless tobacco: Never Used   Substance and Sexual Activity    Alcohol use: Yes     Frequency: 2-4 times a month     Drinks per session: 1 or 2    Drug use: Never    Sexual activity: Yes     Partners: Male       Current Outpatient Medications on File Prior to Visit   Medication Sig    ALPRAZolam (XANAX) 2 MG tablet Take by mouth daily at bedtime as needed for anxiety    diphenhydrAMINE (BENADRYL) 25 mg capsule Take 25 mg by mouth every 6 (six) hours as needed for itching    FLUoxetine (PROzac) 20 mg capsule Take 20 mg by mouth daily    ibuprofen (MOTRIN) 200 mg tablet Take 200 mg by mouth every 6 (six) hours as needed    metroNIDAZOLE (METROGEL) 0 75 % gel Apply topically daily To face    rosuvastatin (CRESTOR) 10 MG tablet TAKE 1 TABLET BY MOUTH DAILY    Turmeric 500 MG CAPS Take 500 mg by mouth 6 (six) times a day      No current facility-administered medications on file prior to visit  No Known Allergies    Physical Exam:    Resp 20   Ht 5' 4" (1 626 m)   Wt 75 9 kg (167 lb 6 4 oz)   BMI 28 73 kg/m²     Constitutional:normal, well developed, well nourished, alert, in no distress and non-toxic and no overt pain behavior    Eyes:anicteric  HEENT:grossly intact  Neck:supple, symmetric, trachea midline and no masses   Pulmonary:even and unlabored  Cardiovascular:No edema or pitting edema present  Skin:Normal without rashes or lesions and well hydrated  Psychiatric:Mood and affect appropriate  Neurologic:Cranial Nerves II-XII grossly intact  Musculoskeletal:normal    Imaging  LEFT SHOULDER     INDICATION:   M25 512: Pain in left shoulder      COMPARISON:  None     VIEWS:  XR SHOULDER 2+ VW LEFT         FINDINGS:     There is no acute fracture or dislocation      No significant degenerative changes      No lytic or blastic lesions are seen      Soft tissues are unremarkable      IMPRESSION:     No acute osseous abnormality  MRI CERVICAL SPINE WITHOUT CONTRAST     INDICATION: M54 12: Radiculopathy, cervical region  M50 120: Mid-cervical disc disorder, unspecified level      COMPARISON:  None      TECHNIQUE:  Sagittal T1, sagittal T2, sagittal inversion recovery, axial T2, axial  2D merge     IMAGE QUALITY:  Diagnostic     FINDINGS:     ALIGNMENT:  Normal alignment of the cervical spine  No compression fracture  No subluxation  No scoliosis      MARROW SIGNAL:  Normal marrow signal is identified within the visualized bony structures  No discrete marrow lesion      CERVICAL AND VISUALIZED THORACIC CORD:  Normal signal within the visualized cord      PREVERTEBRAL AND PARASPINAL SOFT TISSUES:  Normal      VISUALIZED POSTERIOR FOSSA:  The visualized posterior fossa demonstrates no abnormal signal      CERVICAL DISC SPACES:     C2-C3:  Normal      C3-C4:  Normal disc height and signal   There is right facet hypertrophic degenerative change  No canal stenosis  Mild right foraminal narrowing      C4-C5:  Tiny central disc protrusion  No canal stenosis or foraminal narrowing      C5-C6:  Slight loss of disc height with annular bulging and a small broad-based central disc protrusion  Mild bilateral uncinate joint hypertrophic changes  There is mild canal stenosis  Mild to moderate left greater than right foraminal narrowing      C6-C7:  Mild degenerative disc disease without disc herniation, canal stenosis or foraminal narrowing      C7-T1:  Normal      UPPER THORACIC DISC SPACES:  Normal      IMPRESSION:     Cervical spondylitic degenerative change most prominent at the C5-6 level due to annular bulging with small central disc protrusion and bilateral uncinate joint hypertrophic change    Moderate left greater than right foraminal narrowing      No cord compression  MRI LUMBAR SPINE WITHOUT CONTRAST     INDICATION: M54 16: Radiculopathy, lumbar region      COMPARISON:  None      TECHNIQUE:  Sagittal T1, sagittal T2, sagittal inversion recovery, axial T1 and axial T2, coronal T2     IMAGE QUALITY:  Diagnostic     FINDINGS:     VERTEBRAL BODIES:  There are 5 lumbar type vertebral bodies  Normal alignment of the lumbar spine  No spondylolysis or spondylolisthesis  No scoliosis  No compression fracture  Normal marrow signal is identified within the visualized bony   structures  No discrete marrow lesion      SACRUM:  Normal signal within the sacrum  No evidence of insufficiency or stress fracture      DISTAL CORD AND CONUS:  Normal size and signal within the distal cord and conus      PARASPINAL SOFT TISSUES:  Paraspinal soft tissues are unremarkable      LOWER THORACIC DISC SPACES:  Normal disc height and signal   No disc herniation, canal stenosis or foraminal narrowing      LUMBAR DISC SPACES:     L1-L2:  Normal      L2-L3:  Small left foraminal disc protrusion  No canal stenosis or right foraminal narrowing  Mild left foraminal narrowing disc material abutting the exiting nerve      L3-L4:  Mild diffuse annular bulging with a broad-based right foraminal disc protrusion  There is no canal stenosis  Mild to moderate right foraminal narrowing with disc material abutting the exiting nerve      L4-L5:  Diffuse annular bulging  Left foraminal annular fissure  Mild canal stenosis and mild left greater than right foraminal narrowing      L5-S1:  Normal disc height and signal   Facet hypertrophic degenerative change without canal stenosis or foraminal narrowing      IMPRESSION:     Lumbar degenerative disc disease with annular bulging, foraminal disc herniations and facet hypertrophic changes  Mild canal stenosis without cauda equina impingement    There is left foraminal narrowing at L2-3, right foraminal narrowing at L3-4 and   bilateral foraminal narrowing at L4-5, described in detail above

## 2019-12-09 NOTE — H&P (VIEW-ONLY)
Assessment:  1  Cervical disc disorder with radiculopathy of mid-cervical region  FL spine and pain procedure    EMG 2 Limb Upper Extremity   2  Cervical radiculopathy  FL spine and pain procedure    EMG 2 Limb Upper Extremity   3  Lumbar radiculopathy       Plan: This is a 69-year-old female who presents today for follow-up with neck pain and low back pain which is multifactorial in origin  MRI of the cervical and lumbosacral spine was reviewed with patient in detail and all her questions were answered to her satisfaction  Patient does have some disc herniations and protrusions in the cervical and the lumbosacral spine  The patient's pain persists despite time, relative rest, activity modification and PT/water therapy  I believe that she would benefit from cervical epidural steroid injection to diminish any inflammatory component of her pain  We will initially use an interlaminar approach  The injection may need to be repeated or alternate approach utilized based on the degree of pain relief following the initial injection  In the office today, we reviewed the nature of the patient's pathology in depth using diagrams and models  We discussed the approach we would use for the epidural steroid injection and provided literature for home review  The patient understands the risks associated with the procedure including bleeding, infection, tissue reaction, allergic reaction, spinal headache and paralysis and provided written and verbal consent in the office today  My impressions and treatment recommendations were discussed in detail with the patient who verbalized understanding and had no further questions  Discharge instructions were provided  I personally saw and examined the patient and I agree with the above discussed plan of care  History of Present Illness:  Daniela Peoples is a 38 y o  female who presents for a follow up office visit in regards to Back Pain; Neck Pain;  Shoulder Pain (mostly on the left side); Arm Pain (left numbness & tingling); Hand Pain (left numbness & tingling); Leg Pain (left side pain); and Foot Pain (left side pain)  The patients current symptoms include constant, burning, sharp, throbbing pain which shooting sensation and numbness and pins and needles sensation  Patient reports 8/10 pain  Patient has been undergoing aquatic therapy as well as massage therapy as well  Patient recently had MRIs of the cervical and lumbosacral spine without contrast and is here today to review the results accordingly  No recent changes in health  Patient continues to be maintained on NSAIDs as needed  I have personally reviewed and/or updated the patient's past medical history, past surgical history, family history, social history, current medications, allergies, and vital signs today  Review of Systems   Respiratory: Negative for shortness of breath  Cardiovascular: Negative for chest pain  Gastrointestinal: Negative for constipation, diarrhea, nausea and vomiting  Musculoskeletal: Positive for gait problem and joint swelling  Negative for arthralgias and myalgias  Skin: Negative for rash  Neurological: Positive for dizziness and numbness  Negative for seizures and weakness  All other systems reviewed and are negative        Patient Active Problem List   Diagnosis    Cervical radiculopathy    Cervical disc disorder with radiculopathy of mid-cervical region    Lumbar radiculopathy    Depression, major, recurrent ([facility] [address] )    Anxiety    Mixed hyperlipidemia       Past Medical History:   Diagnosis Date    Anxiety     Cervical radiculopathy     Degenerative disc disease, cervical     pt states entire back    Depression     IBS (irritable bowel syndrome)     Lumbar radiculopathy     Migraine     Post concussion syndrome 11/25/2019       Past Surgical History:   Procedure Laterality Date    SHOULDER SURGERY Right        Family History   Problem Relation Age of Onset    ALS Mother     Multiple sclerosis Father     Multiple sclerosis Sister        Social History     Occupational History    Not on file   Tobacco Use    Smoking status: Current Every Day Smoker     Packs/day: 0 50     Types: Cigarettes    Smokeless tobacco: Never Used   Substance and Sexual Activity    Alcohol use: Yes     Frequency: 2-4 times a month     Drinks per session: 1 or 2    Drug use: Never    Sexual activity: Yes     Partners: Male       Current Outpatient Medications on File Prior to Visit   Medication Sig    ALPRAZolam (XANAX) 2 MG tablet Take by mouth daily at bedtime as needed for anxiety    diphenhydrAMINE (BENADRYL) 25 mg capsule Take 25 mg by mouth every 6 (six) hours as needed for itching    FLUoxetine (PROzac) 20 mg capsule Take 20 mg by mouth daily    ibuprofen (MOTRIN) 200 mg tablet Take 200 mg by mouth every 6 (six) hours as needed    metroNIDAZOLE (METROGEL) 0 75 % gel Apply topically daily To face    rosuvastatin (CRESTOR) 10 MG tablet TAKE 1 TABLET BY MOUTH DAILY    Turmeric 500 MG CAPS Take 500 mg by mouth 6 (six) times a day      No current facility-administered medications on file prior to visit  No Known Allergies    Physical Exam:    Resp 20   Ht 5' 4" (1 626 m)   Wt 75 9 kg (167 lb 6 4 oz)   BMI 28 73 kg/m²     Constitutional:normal, well developed, well nourished, alert, in no distress and non-toxic and no overt pain behavior    Eyes:anicteric  HEENT:grossly intact  Neck:supple, symmetric, trachea midline and no masses   Pulmonary:even and unlabored  Cardiovascular:No edema or pitting edema present  Skin:Normal without rashes or lesions and well hydrated  Psychiatric:Mood and affect appropriate  Neurologic:Cranial Nerves II-XII grossly intact  Musculoskeletal:normal    Imaging  LEFT SHOULDER     INDICATION:   M25 512: Pain in left shoulder      COMPARISON:  None     VIEWS:  XR SHOULDER 2+ VW LEFT         FINDINGS:     There is no acute fracture or dislocation      No significant degenerative changes      No lytic or blastic lesions are seen      Soft tissues are unremarkable      IMPRESSION:     No acute osseous abnormality  MRI CERVICAL SPINE WITHOUT CONTRAST     INDICATION: M54 12: Radiculopathy, cervical region  M50 120: Mid-cervical disc disorder, unspecified level      COMPARISON:  None      TECHNIQUE:  Sagittal T1, sagittal T2, sagittal inversion recovery, axial T2, axial  2D merge     IMAGE QUALITY:  Diagnostic     FINDINGS:     ALIGNMENT:  Normal alignment of the cervical spine  No compression fracture  No subluxation  No scoliosis      MARROW SIGNAL:  Normal marrow signal is identified within the visualized bony structures  No discrete marrow lesion      CERVICAL AND VISUALIZED THORACIC CORD:  Normal signal within the visualized cord      PREVERTEBRAL AND PARASPINAL SOFT TISSUES:  Normal      VISUALIZED POSTERIOR FOSSA:  The visualized posterior fossa demonstrates no abnormal signal      CERVICAL DISC SPACES:     C2-C3:  Normal      C3-C4:  Normal disc height and signal   There is right facet hypertrophic degenerative change  No canal stenosis  Mild right foraminal narrowing      C4-C5:  Tiny central disc protrusion  No canal stenosis or foraminal narrowing      C5-C6:  Slight loss of disc height with annular bulging and a small broad-based central disc protrusion  Mild bilateral uncinate joint hypertrophic changes  There is mild canal stenosis  Mild to moderate left greater than right foraminal narrowing      C6-C7:  Mild degenerative disc disease without disc herniation, canal stenosis or foraminal narrowing      C7-T1:  Normal      UPPER THORACIC DISC SPACES:  Normal      IMPRESSION:     Cervical spondylitic degenerative change most prominent at the C5-6 level due to annular bulging with small central disc protrusion and bilateral uncinate joint hypertrophic change    Moderate left greater than right foraminal narrowing      No cord compression  MRI LUMBAR SPINE WITHOUT CONTRAST     INDICATION: M54 16: Radiculopathy, lumbar region      COMPARISON:  None      TECHNIQUE:  Sagittal T1, sagittal T2, sagittal inversion recovery, axial T1 and axial T2, coronal T2     IMAGE QUALITY:  Diagnostic     FINDINGS:     VERTEBRAL BODIES:  There are 5 lumbar type vertebral bodies  Normal alignment of the lumbar spine  No spondylolysis or spondylolisthesis  No scoliosis  No compression fracture  Normal marrow signal is identified within the visualized bony   structures  No discrete marrow lesion      SACRUM:  Normal signal within the sacrum  No evidence of insufficiency or stress fracture      DISTAL CORD AND CONUS:  Normal size and signal within the distal cord and conus      PARASPINAL SOFT TISSUES:  Paraspinal soft tissues are unremarkable      LOWER THORACIC DISC SPACES:  Normal disc height and signal   No disc herniation, canal stenosis or foraminal narrowing      LUMBAR DISC SPACES:     L1-L2:  Normal      L2-L3:  Small left foraminal disc protrusion  No canal stenosis or right foraminal narrowing  Mild left foraminal narrowing disc material abutting the exiting nerve      L3-L4:  Mild diffuse annular bulging with a broad-based right foraminal disc protrusion  There is no canal stenosis  Mild to moderate right foraminal narrowing with disc material abutting the exiting nerve      L4-L5:  Diffuse annular bulging  Left foraminal annular fissure  Mild canal stenosis and mild left greater than right foraminal narrowing      L5-S1:  Normal disc height and signal   Facet hypertrophic degenerative change without canal stenosis or foraminal narrowing      IMPRESSION:     Lumbar degenerative disc disease with annular bulging, foraminal disc herniations and facet hypertrophic changes  Mild canal stenosis without cauda equina impingement    There is left foraminal narrowing at L2-3, right foraminal narrowing at L3-4 and   bilateral foraminal narrowing at L4-5, described in detail above

## 2019-12-13 ENCOUNTER — OFFICE VISIT (OUTPATIENT)
Dept: PHYSICAL THERAPY | Age: 44
End: 2019-12-13
Payer: COMMERCIAL

## 2019-12-13 DIAGNOSIS — M54.12 CERVICAL RADICULOPATHY: Primary | ICD-10-CM

## 2019-12-13 DIAGNOSIS — M54.16 LUMBAR RADICULOPATHY: ICD-10-CM

## 2019-12-13 PROCEDURE — 97113 AQUATIC THERAPY/EXERCISES: CPT

## 2019-12-13 NOTE — PROGRESS NOTES
Daily Note     Today's date: 2019  Patient name: Bisi Bermudez  : 1975  MRN: [id number]  Referring provider: Sánchez Kaiser MD  Dx:   Encounter Diagnosis     ICD-10-CM    1  Cervical radiculopathy M54 12    2  Lumbar radiculopathy M54 16        Start Time: 1400  Stop Time: 1500  Total time in clinic (min): 60 minutes    Subjective: pt continues w/ pain in LB and neck 8/10  She c/o B upper trap tightness w/ tightness into cervical spine  Pain post session 5/10  Objective: See treatment diary below      Assessment: Tolerated treatment fairly well  Pt continues w/ tightness in c/s and l/s  sig relief after session today  Pt is having 24 hour carryover after session  Continue w/ LE/UE and core strengthening  Pt had palpable spasms in B traps and R rhomboid  Patient would benefit from continued PT      Plan: Continue per plan of care        Precautions: neck, back pain, depression    Daily Treatment Diary       Exercise Diary  10/8 10/10 10/15 10/23 11/1 11/21 11/25 11/29 12/13    Water walking 5 10 10 nt 10 10 10 10 10    Postural training             Gait training             Home exercise pgm/patient education  5'  Pt ed 5'         Wall: t/h raises 1 1 1 nt 1 1 1 1 1    Hip abd/add 2 2 2 nt nt nt 1 1 1    Marching 1 1 1 nt 1 1 1 1 1    squats 1  1 nt nt nt nt  1    Knee flex/ext  1 1 nt 1 1 nt 1 1    Step-ups (fwd/bkwd/ss)             SLS (eyes open/closed)             SLS w UE mvmt  AROM/ball toss             Weight shifting             UE Noodle work x 4  5 nt   4' 4 nt 4 4    UE AROM w/ breathing  5' 5' nt 4' 4 4 5 5    Resistive UE work (paddles, bells, TB)             Core work on noodle (sitting/stdg)                          Seated on pool bench w proper posture  2 2 nt         Ankle df/pf  1 1 nt  1 1 1 1    marching  1 1 nt  1 1 1 1    Hip Ab/add  1 1 nt  1 1 1 1    Knee flex/ext  1 1 nt  1 1 1 1    Manual stretching/body work/ c/s tx/STMOB in supine    30' 25' 20 20 20 15    Deep water tx/stretching 5 10 10 10' in supine/nekdoodle 5' 5 5' 5 5    Specific self - stretches wall/steps 5 3' 5' nt 2' 2 2 2 2        Modalities   10/10 10/15 10/23 11/1 11/21 11/22 11/29 12/13    whirlpool 5 10 10 10 10 10 10 10 10

## 2019-12-16 ENCOUNTER — OFFICE VISIT (OUTPATIENT)
Dept: NEUROLOGY | Facility: CLINIC | Age: 44
End: 2019-12-16
Payer: COMMERCIAL

## 2019-12-16 VITALS
DIASTOLIC BLOOD PRESSURE: 76 MMHG | WEIGHT: 165 LBS | SYSTOLIC BLOOD PRESSURE: 114 MMHG | BODY MASS INDEX: 29.23 KG/M2 | HEART RATE: 86 BPM | HEIGHT: 63 IN

## 2019-12-16 DIAGNOSIS — M54.16 LUMBAR RADICULOPATHY: Chronic | ICD-10-CM

## 2019-12-16 DIAGNOSIS — G43.009 MIGRAINE WITHOUT AURA AND WITHOUT STATUS MIGRAINOSUS, NOT INTRACTABLE: Primary | ICD-10-CM

## 2019-12-16 DIAGNOSIS — G43.009 MIGRAINE WITHOUT AURA AND WITHOUT STATUS MIGRAINOSUS, NOT INTRACTABLE: ICD-10-CM

## 2019-12-16 DIAGNOSIS — M54.12 CERVICAL RADICULOPATHY: Chronic | ICD-10-CM

## 2019-12-16 PROCEDURE — 99204 OFFICE O/P NEW MOD 45 MIN: CPT | Performed by: PSYCHIATRY & NEUROLOGY

## 2019-12-16 RX ORDER — TOPIRAMATE 100 MG/1
TABLET, FILM COATED ORAL
Qty: 90 TABLET | Refills: 0 | Status: SHIPPED | OUTPATIENT
Start: 2019-12-16 | End: 2020-03-09

## 2019-12-16 RX ORDER — TOPIRAMATE 25 MG/1
TABLET ORAL
Qty: 42 TABLET | Refills: 0 | Status: SHIPPED | OUTPATIENT
Start: 2019-12-16 | End: 2020-01-21

## 2019-12-16 RX ORDER — SUMATRIPTAN 100 MG/1
TABLET, FILM COATED ORAL
Qty: 9 TABLET | Refills: 5 | Status: SHIPPED | OUTPATIENT
Start: 2019-12-16 | End: 2022-04-12 | Stop reason: ALTCHOICE

## 2019-12-16 RX ORDER — TOPIRAMATE 25 MG/1
TABLET ORAL
Qty: 180 TABLET | Refills: 0 | OUTPATIENT
Start: 2019-12-16

## 2019-12-16 RX ORDER — TOPIRAMATE 100 MG/1
TABLET, FILM COATED ORAL
Qty: 30 TABLET | Refills: 5 | Status: SHIPPED | OUTPATIENT
Start: 2019-12-16 | End: 2019-12-16 | Stop reason: SDUPTHER

## 2019-12-16 NOTE — PROGRESS NOTES
Jason Park is a 38 y o  female who presents today with complaints of headaches, neck pain and low back pain    Assessment:  1  Migraine without aura and without status migrainosus, not intractable    2  Lumbar radiculopathy    3  Cervical radiculopathy        Plan:  Topamax 25 mg titrating to 100 mg at bedtime  Imitrex as needed  Continue pain management  Follow-up 2 months    Discussion:  Mike Meredith reports a long history of migraine headaches occurring daily  She was treated in the past with gabapentin without relief  She had an MRI done of her brain in March which was normal by report  We did discuss potential triggers for migraine headache and recommended keeping a headache calendar  She will start Topamax 25 mg at bedtime titrating weekly up to 100 mg and may use Imitrex as needed for breakthrough headaches  We did discuss potential adverse effects of medications  She also reports chronic neck pain and low back pain and recent imaging studies demonstrate disc bulging at L3-4 lateralized to the right L4-5 lateralized to the left and MRI of the cervical spine demonstrated spondylitic changes most severe at C5-6 causing neural foraminal narrowing  She has been seen by pain management and anticipates cervical epidural injections in the near future  She is scheduled to have EMG study done of the upper extremities  I will see her back in follow-up in 2 months      Subjective:    HPI  Mike Meredith is a right-handed woman accompanied by her  today with the above complaints  She reports that she has been getting headaches for many years  She was seen by a neurologist in [place] and more recently by a [place] [facility] [place]  She states she was prescribed gabapentin without relief  She states that her headaches lateralized more to the left side and are throbbing pounding type pain often feeling like a hot poker stuck in her head    She associates these headaches with photophobia, sonophobia nausea without aura  She states she feels that she gets the headaches every day  When they are severe she takes ibuprofen with some relief  She states she does not take it frequently  She had an MRI of her brain done this past March which reportedly was normal   She also reports a long history of neck pain initially lateralizing to the right more recently with pain radiating into the left upper extremity  She had an MRI done of her neck recently which demonstrated spondylitic changes most severe at C5-6 with neural foraminal narrowing  She has been receiving physical therapy and has been seen by pain management  She anticipates cervical epidural injection in the near future which she is nervous about  She also reports symptoms of low back pain bilaterally radiating into the lower extremities  These of symptoms that have been going on for many years as well  Recent imaging demonstrated disc bulging at L3-4 lateralized to the right and disc bulging at L4-5 lateralized to the left  She is currently being treated pain management as she did not get adequate relief with physical therapy  She reports that she takes ibuprofen as needed when her pain is severe in her neck and back areas    She denies any bowel or bladder dysfunction      Past Medical History:   Diagnosis Date    Anxiety     Back injury     Cervical radiculopathy     Degenerative disc disease, cervical     pt states entire back    Depression     Head injury     hx of 4 concussions    IBS (irritable bowel syndrome)     Lumbar radiculopathy     Memory loss     Migraine     Neck injury     Sciatic nerve pain, left        Family History:  Family History   Problem Relation Age of Onset    ALS Mother     Multiple sclerosis Father     Multiple sclerosis Sister     No Known Problems Brother     No Known Problems Sister     No Known Problems Brother        Past Surgical History:  Past Surgical History:   Procedure Laterality Date    SHOULDER SURGERY Right        Social History:   reports that she has been smoking cigarettes  She has been smoking about 0 50 packs per day  She has never used smokeless tobacco  She reports that she drinks alcohol  She reports that she does not use drugs  Allergies:  Cyclobenzaprine and Naproxen      Current Outpatient Medications:     ALPRAZolam (XANAX) 2 MG tablet, Take by mouth daily at bedtime as needed for anxiety, Disp: , Rfl:     DIGESTIVE ENZYMES PO, Take by mouth, Disp: , Rfl:     diphenhydrAMINE (BENADRYL) 25 mg capsule, Take 25 mg by mouth every 6 (six) hours as needed for itching, Disp: , Rfl:     FLUoxetine (PROzac) 20 mg capsule, Take 20 mg by mouth daily, Disp: , Rfl:     ibuprofen (MOTRIN) 200 mg tablet, Take 200 mg by mouth every 6 (six) hours as needed, Disp: , Rfl:     Lactobacillus (PROBIOTIC ACIDOPHILUS PO), Take by mouth, Disp: , Rfl:     metroNIDAZOLE (METROGEL) 0 75 % gel, Apply topically daily To face, Disp: 45 g, Rfl: 3    rosuvastatin (CRESTOR) 10 MG tablet, TAKE 1 TABLET BY MOUTH DAILY, Disp: 90 tablet, Rfl: 3    Turmeric 500 MG CAPS, Take 500 mg by mouth 6 (six) times a day , Disp: , Rfl:     SUMAtriptan (IMITREX) 100 mg tablet, One p  O  At headache onset, may repeat after 2 hours p r n  Maximum 2/24 hours, Disp: 9 tablet, Rfl: 5    topiramate (TOPAMAX) 100 mg tablet, One p o  Q h s , Disp: 30 tablet, Rfl: 5    topiramate (TOPAMAX) 25 mg tablet, One p o  Q h s  For 1 week then 2 p o  Q h s  For 1 week then 3 p o  Q h s  For 1 week then increase to 100 mg size, Disp: 42 tablet, Rfl: 0    I have reviewed the past medical, social and family history, current medications, allergies, vitals, review of systems and updated this information as appropriate today     Objective:    Vitals:  Blood pressure 114/76, pulse 86, height 5' 3" (1 6 m), weight 74 8 kg (165 lb)  Physical Exam    Neurological Exam    GENERAL:  Cooperative in no acute distress  Well-developed and well-nourished    HEAD and NECK   Head is atraumatic normocephalic with no lesions or masses  Neck is supple with restricted range of motion in all planes  There is diffuse tenderness in the lateral and posterior cervical paraspinal region bilaterally    CARDIOVASCULAR  Carotid Arteries-no carotid bruits  MUSCULOSKELETAL:  Back is straight with diffuse tenderness across the low lumbar region  She is able to flex to touch her toes  Straight leg raising is negative bilaterally    NEUROLOGIC:  Mental Status-the patient is awake alert and oriented without aphasia or apraxia  Cranial Nerves: Visual fields are full to confrontation  Discs are flat  Extraocular movements are full without nystagmus  Pupils are 2-1/2 mm and reactive  Face sensation is intact to light touch, reportedly more sensitive on the right than left  Movements of facial expression move symmetrically  Hearing is normal to finger rub bilaterally  Soft palate lifts symmetrically  Shoulder shrug is symmetrical  Tongue is midline without atrophy  Motor: No drift is noted on arm extension  Strength is full in the upper and lower extremities with normal bulk and tone  There is give-way weakness with variable effort in the left upper and lower extremity  Sensory: Intact to temperature and vibratory sensation in the upper and lower extremities bilaterally  Cortical function is intact  Coordination: Finger to nose testing is performed accurately  Romberg is negative  Gait reveals a normal base with symmetrical arm swing  Tandem walk is normal   Reflexes:  2/4 and symmetrical in the biceps, triceps, brachioradialis, knee jerk and ankle jerk regions  Toes are downgoing          ROS:    Review of Systems   Constitutional: Positive for fatigue  Negative for appetite change and fever  HENT: Positive for congestion, ear pain and postnasal drip  Negative for hearing loss, tinnitus, trouble swallowing and voice change      Eyes: Positive for photophobia and visual disturbance  Negative for pain  Respiratory: Negative  Negative for shortness of breath  Cardiovascular: Negative  Negative for palpitations  Gastrointestinal: Negative  Negative for nausea and vomiting  Endocrine: Negative  Negative for cold intolerance and heat intolerance  Genitourinary: Negative  Negative for dysuria, frequency and urgency  Musculoskeletal: Positive for arthralgias, back pain, gait problem, myalgias and neck pain  Leg pain   Skin: Negative  Negative for rash  Neurological: Positive for weakness (left side of body), numbness (hands and feet) and headaches  Negative for dizziness, tremors, seizures, syncope, facial asymmetry, speech difficulty and light-headedness  Hematological: Bruises/bleeds easily  Psychiatric/Behavioral: Positive for dysphoric mood and sleep disturbance  Negative for confusion and hallucinations  The patient is nervous/anxious           Word finding difficulty

## 2019-12-16 NOTE — LETTER
December 16, 2019     Mukesh Clement Dr, MD  [address] [place] [id number]    Patient: Judd Arredondo   YOB: 1975   Date of Visit: 12/16/2019       Dear Dr Janeth Stover:    Thank you for referring Judd Arredondo to me for evaluation  Below are my notes for this consultation  If you have questions, please do not hesitate to call me  I look forward to following your patient along with you  Sincerely,        Anayeli Bhat MD        CC: DO Anayeli Joe MD  12/16/2019  9:34 AM  Incomplete  Judd Arredondo is a 38 y o  female who presents today with complaints of headaches, neck pain and low back pain    Assessment:  1  Migraine without aura and without status migrainosus, not intractable    2  Lumbar radiculopathy    3  Cervical radiculopathy        Plan:  Topamax 25 mg titrating to 100 mg at bedtime  Imitrex as needed  Continue pain management  Follow-up 2 months    Discussion:  Abhishek Dunne reports a long history of migraine headaches occurring daily  She was treated in the past with gabapentin without relief  She had an MRI done of her brain in March which was normal by report  We did discuss potential triggers for migraine headache and recommended keeping a headache calendar  She will start Topamax 25 mg at bedtime titrating weekly up to 100 mg and may use Imitrex as needed for breakthrough headaches  We did discuss potential adverse effects of medications  She also reports chronic neck pain and low back pain and recent imaging studies demonstrate disc bulging at L3-4 lateralized to the right L4-5 lateralized to the left and MRI of the cervical spine demonstrated spondylitic changes most severe at C5-6 causing neural foraminal narrowing  She has been seen by pain management and anticipates cervical epidural injections in the near future  She is scheduled to have EMG study done of the upper extremities    I will see her back in follow-up in 2 months      Subjective:    HPI  Mike Meredith is a right-handed woman accompanied by her  today with the above complaints  She reports that she has been getting headaches for many years  She was seen by a neurologist in [place] and more recently by a neurologist at [facility] for her headaches  She states she was prescribed gabapentin without relief  She states that her headaches lateralized more to the left side and are throbbing pounding type pain often feeling like a hot poker stuck in her head  She associates these headaches with photophobia, sonophobia nausea without aura  She states she feels that she gets the headaches every day  When they are severe she takes ibuprofen with some relief  She states she does not take it frequently  She had an MRI of her brain done this past March which reportedly was normal   She also reports a long history of neck pain initially lateralizing to the right more recently with pain radiating into the left upper extremity  She had an MRI done of her neck recently which demonstrated spondylitic changes most severe at C5-6 with neural foraminal narrowing  She has been receiving physical therapy and has been seen by pain management  She anticipates cervical epidural injection in the near future which she is nervous about  She also reports symptoms of low back pain bilaterally radiating into the lower extremities  These of symptoms that have been going on for many years as well  Recent imaging demonstrated disc bulging at L3-4 lateralized to the right and disc bulging at L4-5 lateralized to the left  She is currently being treated pain management as she did not get adequate relief with physical therapy  She reports that she takes ibuprofen as needed when her pain is severe in her neck and back areas    She denies any bowel or bladder dysfunction      Past Medical History:   Diagnosis Date    Anxiety     Back injury     Cervical radiculopathy     Degenerative disc disease, cervical     pt states entire back    Depression     Head injury     hx of 4 concussions    IBS (irritable bowel syndrome)     Lumbar radiculopathy     Memory loss     Migraine     Neck injury     Sciatic nerve pain, left        Family History:  Family History   Problem Relation Age of Onset   [facility] ALS Mother     Multiple sclerosis Father     Multiple sclerosis Sister     No Known Problems Brother     No Known Problems Sister     No Known Problems Brother        Past Surgical History:  Past Surgical History:   Procedure Laterality Date    SHOULDER SURGERY Right        Social History:   reports that she has been smoking cigarettes  She has been smoking about 0 50 packs per day  She has never used smokeless tobacco  She reports that she drinks alcohol  She reports that she does not use drugs  Allergies:  Cyclobenzaprine and Naproxen      Current Outpatient Medications:     ALPRAZolam (XANAX) 2 MG tablet, Take by mouth daily at bedtime as needed for anxiety, Disp: , Rfl:     DIGESTIVE ENZYMES PO, Take by mouth, Disp: , Rfl:     diphenhydrAMINE (BENADRYL) 25 mg capsule, Take 25 mg by mouth every 6 (six) hours as needed for itching, Disp: , Rfl:     FLUoxetine (PROzac) 20 mg capsule, Take 20 mg by mouth daily, Disp: , Rfl:     ibuprofen (MOTRIN) 200 mg tablet, Take 200 mg by mouth every 6 (six) hours as needed, Disp: , Rfl:     Lactobacillus (PROBIOTIC ACIDOPHILUS PO), Take by mouth, Disp: , Rfl:     metroNIDAZOLE (METROGEL) 0 75 % gel, Apply topically daily To face, Disp: 45 g, Rfl: 3    rosuvastatin (CRESTOR) 10 MG tablet, TAKE 1 TABLET BY MOUTH DAILY, Disp: 90 tablet, Rfl: 3    Turmeric 500 MG CAPS, Take 500 mg by mouth 6 (six) times a day , Disp: , Rfl:     SUMAtriptan (IMITREX) 100 mg tablet, One p  O  At headache onset, may repeat after 2 hours p r n   Maximum 2/24 hours, Disp: 9 tablet, Rfl: 5    topiramate (TOPAMAX) 100 mg tablet, One p o  Q h s , Disp: 30 tablet, Rfl: 5    topiramate (TOPAMAX) 25 mg tablet, One p o  Q h s  For 1 week then 2 p o  Q h s  For 1 week then 3 p o  Q h s  For 1 week then increase to 100 mg size, Disp: 42 tablet, Rfl: 0    I have reviewed the past medical, social and family history, current medications, allergies, vitals, review of systems and updated this information as appropriate today     Objective:    Vitals:  Blood pressure 114/76, pulse 86, height 5' 3" (1 6 m), weight 74 8 kg (165 lb)  Physical Exam    Neurological Exam    GENERAL:  Cooperative in no acute distress  Well-developed and well-nourished    HEAD and NECK   Head is atraumatic normocephalic with no lesions or masses  Neck is supple with restricted range of motion in all planes  There is diffuse tenderness in the lateral and posterior cervical paraspinal region bilaterally    CARDIOVASCULAR  Carotid Arteries-no carotid bruits  MUSCULOSKELETAL:  Back is straight with diffuse tenderness across the low lumbar region  She is able to flex to touch her toes  Straight leg raising is negative bilaterally    NEUROLOGIC:  Mental Status-the patient is awake alert and oriented without aphasia or apraxia  Cranial Nerves: Visual fields are full to confrontation  Discs are flat  Extraocular movements are full without nystagmus  Pupils are 2-1/2 mm and reactive  Face sensation is intact to light touch, reportedly more sensitive on the right than left  Movements of facial expression move symmetrically  Hearing is normal to finger rub bilaterally  Soft palate lifts symmetrically  Shoulder shrug is symmetrical  Tongue is midline without atrophy  Motor: No drift is noted on arm extension  Strength is full in the upper and lower extremities with normal bulk and tone  There is give-way weakness with variable effort in the left upper and lower extremity  Sensory: Intact to temperature and vibratory sensation in the upper and lower extremities bilaterally   Cortical function is intact  Coordination: Finger to nose testing is performed accurately  Romberg is negative  Gait reveals a normal base with symmetrical arm swing  Tandem walk is normal   Reflexes:  2/4 and symmetrical in the biceps, triceps, brachioradialis, knee jerk and ankle jerk regions  Toes are downgoing          ROS:    Review of Systems   Constitutional: Positive for fatigue  Negative for appetite change and fever  HENT: Positive for congestion, ear pain and postnasal drip  Negative for hearing loss, tinnitus, trouble swallowing and voice change  Eyes: Positive for photophobia and visual disturbance  Negative for pain  Respiratory: Negative  Negative for shortness of breath  Cardiovascular: Negative  Negative for palpitations  Gastrointestinal: Negative  Negative for nausea and vomiting  Endocrine: Negative  Negative for cold intolerance and heat intolerance  Genitourinary: Negative  Negative for dysuria, frequency and urgency  Musculoskeletal: Positive for arthralgias, back pain, gait problem, myalgias and neck pain  Leg pain   Skin: Negative  Negative for rash  Neurological: Positive for weakness (left side of body), numbness (hands and feet) and headaches  Negative for dizziness, tremors, seizures, syncope, facial asymmetry, speech difficulty and light-headedness  Hematological: Bruises/bleeds easily  Psychiatric/Behavioral: Positive for dysphoric mood and sleep disturbance  Negative for confusion and hallucinations  The patient is nervous/anxious           Word finding difficulty

## 2019-12-17 ENCOUNTER — TELEPHONE (OUTPATIENT)
Dept: PAIN MEDICINE | Facility: CLINIC | Age: 44
End: 2019-12-17

## 2019-12-17 NOTE — TELEPHONE ENCOUNTER
Patient called requesting more information on her procedure scheduled on 12/23/19   Please advise, anabelle    Call back# [phone]

## 2019-12-18 NOTE — PROGRESS NOTES
PT Re-Evaluation     Today's date: 2019  Patient name: Antony Cota  : 1975  MRN: [id number]  Referring provider: Ronan Bacon MD  Dx:   Encounter Diagnosis     ICD-10-CM    1  Cervical radiculopathy M54 12    2   Lumbar radiculopathy M54 16                   Assessment/Plan    Subjective    Objective        Precautions: neck, back pain, depression    Daily Treatment Diary       Exercise Diary  10/8 10/10 10/15 10/23 11/1 11/21 11/25 11/29 12/13    Water walking 5 10 10 nt 10 10 10 10 10    Postural training             Gait training             Home exercise pgm/patient education  5'  Pt ed 5'         Wall: t/h raises 1 1 1 nt 1 1 1 1 1    Hip abd/add 2 2 2 nt nt nt 1 1 1    Marching 1 1 1 nt 1 1 1 1 1    squats 1  1 nt nt nt nt  1    Knee flex/ext  1 1 nt 1 1 nt 1 1    Step-ups (fwd/bkwd/ss)             SLS (eyes open/closed)             SLS w UE mvmt  AROM/ball toss             Weight shifting             UE Noodle work x 4  5 nt   4' 4 nt 4 4    UE AROM w/ breathing  5' 5' nt 4' 4 4 5 5    Resistive UE work (paddles, bells, TB)             Core work on noodle (sitting/stdg)                          Seated on pool bench w proper posture  2 2 nt         Ankle df/pf  1 1 nt  1 1 1 1    marching  1 1 nt  1 1 1 1    Hip Ab/add  1 1 nt  1 1 1 1    Knee flex/ext  1 1 nt  1 1 1 1    Manual stretching/body work/ c/s tx/STMOB in supine    30' 25' 20 20 20 15    Deep water tx/stretching 5 10 10 10' in supine/nekdoodle 5' 5 5' 5 5    Specific self - stretches wall/steps 5 3' 5' nt 2' 2 2 2 2        Modalities   10/10 10/15 10/23 11/1 11/21 11/22 11/29 12/13    whirlpool 5 10 10 10 10 10 10 10 10

## 2019-12-19 ENCOUNTER — APPOINTMENT (OUTPATIENT)
Dept: PHYSICAL THERAPY | Age: 44
End: 2019-12-19
Payer: COMMERCIAL

## 2019-12-23 ENCOUNTER — HOSPITAL ENCOUNTER (OUTPATIENT)
Dept: RADIOLOGY | Facility: CLINIC | Age: 44
Discharge: HOME | End: 2019-12-23
Attending: ANESTHESIOLOGY
Payer: COMMERCIAL

## 2019-12-23 VITALS
TEMPERATURE: 97.3 F | DIASTOLIC BLOOD PRESSURE: 76 MMHG | RESPIRATION RATE: 20 BRPM | OXYGEN SATURATION: 100 % | HEART RATE: 80 BPM | SYSTOLIC BLOOD PRESSURE: 113 MMHG

## 2019-12-23 DIAGNOSIS — M54.12 CERVICAL RADICULOPATHY: ICD-10-CM

## 2019-12-23 DIAGNOSIS — M50.120 CERVICAL DISC DISORDER WITH RADICULOPATHY OF MID-CERVICAL REGION: ICD-10-CM

## 2019-12-23 PROCEDURE — 62321 NJX INTERLAMINAR CRV/THRC: CPT | Performed by: ANESTHESIOLOGY

## 2019-12-23 RX ORDER — LIDOCAINE HYDROCHLORIDE 10 MG/ML
5 INJECTION, SOLUTION EPIDURAL; INFILTRATION; INTRACAUDAL; PERINEURAL ONCE
Status: COMPLETED | OUTPATIENT
Start: 2019-12-23 | End: 2019-12-23

## 2019-12-23 RX ORDER — PAPAVERINE HCL 150 MG
10 CAPSULE, EXTENDED RELEASE ORAL ONCE
Status: COMPLETED | OUTPATIENT
Start: 2019-12-23 | End: 2019-12-23

## 2019-12-23 RX ORDER — 0.9 % SODIUM CHLORIDE 0.9 %
3 VIAL (ML) INJECTION ONCE
Status: COMPLETED | OUTPATIENT
Start: 2019-12-23 | End: 2019-12-23

## 2019-12-23 RX ADMIN — SODIUM CHLORIDE 3 ML: 9 INJECTION, SOLUTION INTRAMUSCULAR; INTRAVENOUS; SUBCUTANEOUS at 09:49

## 2019-12-23 RX ADMIN — IOHEXOL 1 ML: 300 INJECTION, SOLUTION INTRAVENOUS at 09:50

## 2019-12-23 RX ADMIN — Medication 10 MG: at 09:50

## 2019-12-23 RX ADMIN — LIDOCAINE HYDROCHLORIDE 5 ML: 10 INJECTION, SOLUTION EPIDURAL; INFILTRATION; INTRACAUDAL; PERINEURAL at 09:50

## 2019-12-23 NOTE — DISCHARGE INSTR - LAB
Epidural Steroid Injection   WHAT YOU NEED TO KNOW:   An epidural steroid injection (RIOS) is a procedure to inject steroid medicine into the epidural space  The epidural space is between your spinal cord and vertebrae  Steroids reduce inflammation and fluid buildup in your spine that may be causing pain  You may be given pain medicine along with the steroids  ACTIVITY  · Do not drive or operate machinery today  · No strenuous activity today - bending, lifting, etc   · You may resume normal activites starting tomorrow - start slowly and as tolerated  · You may shower today, but no tub baths or hot tubs  · You may have numbness for several hours from the local anesthetic  Please use caution and common sense, especially with weight-bearing activities  CARE OF THE INJECTION SITE  · If you have soreness or pain, apply ice to the area today (20 minutes on/20 minutes off)  · Starting tomorrow, you may use warm, moist heat or ice if needed  · You may have an increase or change in your discomfort for 36-48 hours after your treatment  · Apply ice and continue with any pain medication you have been prescribed  · Notify [facility] if you have any of the following: redness, drainage, swelling, headache, stiff neck or fever above 100°F     SPECIAL INSTRUCTIONS  · Our office will contact you in approximately 7 days for a progress report  MEDICATIONS  · Continue to take all routine medications  · Our office may have instructed you to hold some medications  If you have a problem specifically related to your procedure, please call our office at [phone]  Problems not related to your procedure should be directed to your primary care physician

## 2019-12-23 NOTE — INTERVAL H&P NOTE
Update: (This section must be completed if the H&P was completed greater than 24 hrs to procedure or admission)    H&P reviewed  After examining the patient, I find no changed to the H&P since it had been written  Patient re-evaluated   Accept as history and physical     Linette Carpio MD/December 23, 2019/9:40 AM

## 2019-12-29 ENCOUNTER — HOSPITAL ENCOUNTER (EMERGENCY)
Facility: HOSPITAL | Age: 44
Discharge: HOME | End: 2019-12-29
Attending: EMERGENCY MEDICINE | Admitting: EMERGENCY MEDICINE
Payer: COMMERCIAL

## 2019-12-29 VITALS
RESPIRATION RATE: 18 BRPM | TEMPERATURE: 98.6 F | OXYGEN SATURATION: 100 % | SYSTOLIC BLOOD PRESSURE: 110 MMHG | DIASTOLIC BLOOD PRESSURE: 55 MMHG | BODY MASS INDEX: 29.21 KG/M2 | HEART RATE: 68 BPM | WEIGHT: 164.9 LBS

## 2019-12-29 DIAGNOSIS — R20.0 NUMBNESS AND TINGLING: ICD-10-CM

## 2019-12-29 DIAGNOSIS — N92.6 IRREGULAR MENSES: Primary | ICD-10-CM

## 2019-12-29 DIAGNOSIS — R20.2 NUMBNESS AND TINGLING: ICD-10-CM

## 2019-12-29 LAB
EXT PREG TEST URINE: NEGATIVE
EXT. CONTROL ED NAV: NORMAL

## 2019-12-29 PROCEDURE — 81025 URINE PREGNANCY TEST: CPT | Performed by: EMERGENCY MEDICINE

## 2019-12-29 PROCEDURE — 99284 EMERGENCY DEPT VISIT MOD MDM: CPT | Performed by: EMERGENCY MEDICINE

## 2019-12-29 PROCEDURE — 96372 THER/PROPH/DIAG INJ SC/IM: CPT

## 2019-12-29 PROCEDURE — 99282 EMERGENCY DEPT VISIT SF MDM: CPT

## 2019-12-29 RX ORDER — IBUPROFEN 800 MG/1
TABLET ORAL
Qty: 20 TABLET | Refills: 0 | Status: SHIPPED | OUTPATIENT
Start: 2019-12-29 | End: 2020-01-21

## 2019-12-29 RX ORDER — KETOROLAC TROMETHAMINE 30 MG/ML
30 INJECTION, SOLUTION INTRAMUSCULAR; INTRAVENOUS ONCE
Status: COMPLETED | OUTPATIENT
Start: 2019-12-29 | End: 2019-12-29

## 2019-12-29 RX ADMIN — KETOROLAC TROMETHAMINE 30 MG: 30 INJECTION, SOLUTION INTRAMUSCULAR at 09:54

## 2019-12-29 NOTE — ED PROVIDER NOTES
History  Chief Complaint   Patient presents with    Pregnancy Test     here for pregnancy test      HPI  57-year-old white female with a chief complaint of numbness in her hands arms and legs bilaterally  Patient states that she recently had an epidural shot her neck and wants to make sure that there is no infection because it feels a little swollen  Patient also states she has not had her period since  and is concerned that she may be pregnant  Patient states she took 2 pregnancy tests at home that were negative  Patient has a history of 1  in the past and no living children  Prior to Admission Medications   Prescriptions Last Dose Informant Patient Reported? Taking? ALPRAZolam (XANAX) 2 MG tablet  Self Yes No   Sig: Take by mouth daily at bedtime as needed for anxiety   DIGESTIVE ENZYMES PO  Self Yes No   Sig: Take by mouth   FLUoxetine (PROzac) 20 mg capsule  Self Yes No   Sig: Take 20 mg by mouth daily   Lactobacillus (PROBIOTIC ACIDOPHILUS PO)   Yes No   Sig: Take by mouth   SUMAtriptan (IMITREX) 100 mg tablet   No No   Sig: One p  O  At headache onset, may repeat after 2 hours p r n  Maximum 24 hours   Turmeric 500 MG CAPS  Self Yes No   Sig: Take 500 mg by mouth 6 (six) times a day    diphenhydrAMINE (BENADRYL) 25 mg capsule  Self Yes No   Sig: Take 25 mg by mouth every 6 (six) hours as needed for itching   ibuprofen (MOTRIN) 200 mg tablet  Self Yes No   Sig: Take 200 mg by mouth every 6 (six) hours as needed   metroNIDAZOLE (METROGEL) 0 75 % gel  Self No No   Sig: Apply topically daily To face   rosuvastatin (CRESTOR) 10 MG tablet  Self No No   Sig: TAKE 1 TABLET BY MOUTH DAILY   topiramate (TOPAMAX) 100 mg tablet   No No   Sig: TAKE 1 TABLET BY MOUTH EVERY NIGHT AT BEDTIME   topiramate (TOPAMAX) 25 mg tablet   No No   Sig: One p o  Q h s  For 1 week then 2 p o  Q h s  For 1 week then 3 p o  Q h s   For 1 week then increase to 100 mg size      Facility-Administered Medications: None       Past Medical History:   Diagnosis Date    Anxiety     Back injury     Cervical radiculopathy     Degenerative disc disease, cervical     pt states entire back    Depression     Head injury     hx of 4 concussions    IBS (irritable bowel syndrome)     Lumbar radiculopathy     Memory loss     Migraine     Neck injury     Sciatic nerve pain, left        Past Surgical History:   Procedure Laterality Date    SHOULDER SURGERY Right        Family History   Problem Relation Age of Onset    ALS Mother     Multiple sclerosis Father     Multiple sclerosis Sister     No Known Problems Brother     No Known Problems Sister     No Known Problems Brother      I have reviewed and agree with the history as documented  Social History     Tobacco Use    Smoking status: Current Every Day Smoker     Packs/day: 0 50     Types: Cigarettes    Smokeless tobacco: Never Used   Substance Use Topics    Alcohol use: Yes     Frequency: 2-4 times a month     Drinks per session: 1 or 2    Drug use: Never        Review of Systems   Constitutional: Negative for diaphoresis, fatigue and fever  HENT: Negative for congestion, ear pain, nosebleeds and sore throat  Eyes: Negative for photophobia, pain, discharge and visual disturbance  Respiratory: Negative for cough, choking, chest tightness, shortness of breath and wheezing  Cardiovascular: Negative for chest pain and palpitations  Gastrointestinal: Negative for abdominal distention, abdominal pain, diarrhea and vomiting  Genitourinary: Positive for menstrual problem  Negative for dysuria, flank pain and frequency  Musculoskeletal: Positive for arthralgias, back pain and myalgias  Negative for gait problem and joint swelling  Skin: Negative for color change and rash  Neurological: Positive for weakness and numbness  Negative for dizziness, syncope and headaches  Psychiatric/Behavioral: Negative for behavioral problems and confusion   The patient is not nervous/anxious  All other systems reviewed and are negative  Physical Exam  Physical Exam   Constitutional: She is oriented to person, place, and time  She appears well-developed and well-nourished  43-year-old white female sitting on the stretcher in no acute distress  HENT:   Head: Normocephalic and atraumatic  Mouth/Throat: Oropharynx is clear and moist    Eyes: Pupils are equal, round, and reactive to light  EOM are normal    Neck: Normal range of motion  Neck supple  Posterior neck: There is a Band-Aid over the midline of the lower cervical region status post epidural injection  There is no evidence of infection, redness or pus drainage  Cardiovascular: Normal rate, regular rhythm and normal heart sounds  Pulmonary/Chest: Effort normal and breath sounds normal  No stridor  No respiratory distress  She has no wheezes  Abdominal: Soft  Bowel sounds are normal  There is no tenderness  There is no rebound and no guarding  Musculoskeletal: Normal range of motion  Neurological: She is alert and oriented to person, place, and time  Coordination normal    Generalized weakness bilateral upper and lower extremities    Skin: Skin is warm and dry  Psychiatric: She has a normal mood and affect  Nursing note and vitals reviewed        Vital Signs  ED Triage Vitals   Temperature Pulse Respirations Blood Pressure SpO2   12/29/19 0912 12/29/19 0912 12/29/19 0912 12/29/19 0912 12/29/19 0912   98 6 °F (37 °C) 68 18 110/55 100 %      Temp Source Heart Rate Source Patient Position - Orthostatic VS BP Location FiO2 (%)   12/29/19 0912 12/29/19 0912 12/29/19 0912 12/29/19 0912 --   Oral Monitor Sitting Right arm       Pain Score       12/29/19 0954       7           Vitals:    12/29/19 0912   BP: 110/55   Pulse: 68   Patient Position - Orthostatic VS: Sitting         Visual Acuity      ED Medications  Medications   ketorolac (TORADOL) injection 30 mg (30 mg Intramuscular Given 12/29/19 0954) Diagnostic Studies  Results Reviewed     Procedure Component Value Units Date/Time    POCT pregnancy, urine [[id number]]  (Normal) Resulted:  12/29/19 0926    Lab Status:  Final result Updated:  12/29/19 0927     EXT PREG TEST UR (Ref: Negative) negative     Control valid                 No orders to display              Procedures  Procedures         ED Course      patient's pregnancy test was negative  I offered patient an injection of Toradol for her symptoms and patient agreed  I instructed patient to call her pain management doctor in the morning  Patient was able to ambulate out of the department in no acute distress  MDM     Differential diagnosis includes:  1  Numbness and tingling bilateral extremities  2  Status post epidural injection  3  Irregular menses  4  Rule out pregnancy       Disposition  Final diagnoses:   Irregular menses   Numbness and tingling - b/l hands/legs     Time reflects when diagnosis was documented in both MDM as applicable and the Disposition within this note     Time User Action Codes Description Comment    12/29/2019  9:41 AM Charley EAGLE Add [N92 6] Irregular menses     12/29/2019  9:42 AM Addison Sanchez Add [R20 0,  R20 2] Numbness and tingling     12/29/2019  9:42 AM Addison Sanchez Modify [R20 0,  R20 2] Numbness and tingling b/l hands/legs      ED Disposition     ED Disposition Condition Date/Time Comment    Discharge Good Sun Dec 29, 2019  9:41 AM Nadira discharge to home/self care              Follow-up Information     Follow up With Specialties Details Why Contact Info    Angela Renner MD Pain Medicine In 1 day Call his office tomorrow and let him know the symptoms you are experiencing [address] DO [facility] In 1 week  [address]      Sergey Rivas MD Obstetrics and Gynecology, Obstetrics, Gynecology In 1 week  [address] [facility]  [phone]            Discharge Medication List as of 12/29/2019  9:48 AM      START taking these medications    Details   !! ibuprofen (MOTRIN) 800 mg tablet Take 1 tablet twice a day with food for 7-10 days, Print       !! - Potential duplicate medications found  Please discuss with provider  CONTINUE these medications which have NOT CHANGED    Details   ALPRAZolam (XANAX) 2 MG tablet Take by mouth daily at bedtime as needed for anxiety, Historical Med      DIGESTIVE ENZYMES PO Take by mouth, Historical Med      diphenhydrAMINE (BENADRYL) 25 mg capsule Take 25 mg by mouth every 6 (six) hours as needed for itching, Historical Med      FLUoxetine (PROzac) 20 mg capsule Take 20 mg by mouth daily, Historical Med      !! ibuprofen (MOTRIN) 200 mg tablet Take 200 mg by mouth every 6 (six) hours as needed, Historical Med      Lactobacillus (PROBIOTIC ACIDOPHILUS PO) Take by mouth, Historical Med      metroNIDAZOLE (METROGEL) 0 75 % gel Apply topically daily To face, Starting Wed 12/4/2019, Normal      rosuvastatin (CRESTOR) 10 MG tablet TAKE 1 TABLET BY MOUTH DAILY, Normal      SUMAtriptan (IMITREX) 100 mg tablet One p  O  At headache onset, may repeat after 2 hours p r n  Maximum 2/24 hours, Normal      !! topiramate (TOPAMAX) 100 mg tablet TAKE 1 TABLET BY MOUTH EVERY NIGHT AT BEDTIME, Normal      !! topiramate (TOPAMAX) 25 mg tablet One p o  Q h s  For 1 week then 2 p o  Q h s  For 1 week then 3 p o  Q h s  For 1 week then increase to 100 mg size, Normal      Turmeric 500 MG CAPS Take 500 mg by mouth 6 (six) times a day , Historical Med       !! - Potential duplicate medications found  Please discuss with provider  No discharge procedures on file      ED Provider  Electronically Signed by           Kiarra Santoyo DO  12/29/19 5523

## 2019-12-30 ENCOUNTER — TELEPHONE (OUTPATIENT)
Dept: PAIN MEDICINE | Facility: CLINIC | Age: 44
End: 2019-12-30

## 2019-12-30 NOTE — TELEPHONE ENCOUNTER
I spoke with patient regarding her pain symptoms  She states that she has neck pain and swelling in her hands with numbness  She states that she was seen in the ER  Swelling is a lot better but she still has wide spread pain all along the spine  She has taken sumatriptan, topomax and ibuprofen  I advised her to use sumatriptan sparingly and not to take in concurrently with nsaids  We will see how she does 2-3 weeks post MARVIN

## 2019-12-30 NOTE — TELEPHONE ENCOUNTER
Patient is calling in requesting a call back from the Dr  She said yesterday she went to the ER due to her having discomfort  She also had some numbness and tingling  She wasn't able to move her legs, arms and neck but mostly  her legs  She was having sharp pains throughout the back due to the injection  She would like to know if this is normal? She is weak her hands are tight and swelling  Her pain level is a 6-8/10 it depending on area

## 2020-01-04 DIAGNOSIS — G43.009 MIGRAINE WITHOUT AURA AND WITHOUT STATUS MIGRAINOSUS, NOT INTRACTABLE: ICD-10-CM

## 2020-01-06 ENCOUNTER — TELEPHONE (OUTPATIENT)
Dept: NEUROLOGY | Facility: CLINIC | Age: 45
End: 2020-01-06

## 2020-01-06 RX ORDER — TOPIRAMATE 25 MG/1
TABLET ORAL
Qty: 42 TABLET | Refills: 0 | OUTPATIENT
Start: 2020-01-06

## 2020-01-06 NOTE — TELEPHONE ENCOUNTER
Called patient and left message that Dr Lito Miranda will not be in the office on 1/23/20  Need to reschedule appt, please transfer call to Jossie Fraga at [facility] office

## 2020-01-09 PROBLEM — Z00.00 ENCOUNTER FOR PREVENTIVE HEALTH EXAMINATION: Status: ACTIVE | Noted: 2020-01-09

## 2020-01-10 NOTE — TELEPHONE ENCOUNTER
See update below   Pt is almost 3 weeks post MARVIN  Do you want to repeat injection or move up 2/17 ov for reeval?

## 2020-01-10 NOTE — TELEPHONE ENCOUNTER
Patient is calling back in stating that the injection that she had on 12/23/9 did not work  She said that she had to go back to the ER the other day  She would like to know that next step can she comeback in for an injection? Or should she come back in for a office visit? Her pain level is a 8/10 she is usually like a 5-7/10 throughout the day  Her muscles are tight and the headaches are coming back  She also said that script that she was told to put in she didn't  She believes it may be to late to do so now please follow up with patient  She also said that Dr Alicia Menjivar was suppose to call her back on Monday, which he didn't

## 2020-01-17 ENCOUNTER — APPOINTMENT (OUTPATIENT)
Dept: ORTHOPEDIC SURGERY | Facility: CLINIC | Age: 45
End: 2020-01-17

## 2020-01-21 ENCOUNTER — OFFICE VISIT (OUTPATIENT)
Dept: PAIN MEDICINE | Facility: CLINIC | Age: 45
End: 2020-01-21
Payer: MEDICARE

## 2020-01-21 VITALS
HEART RATE: 82 BPM | RESPIRATION RATE: 20 BRPM | BODY MASS INDEX: 30.48 KG/M2 | DIASTOLIC BLOOD PRESSURE: 60 MMHG | WEIGHT: 172 LBS | SYSTOLIC BLOOD PRESSURE: 98 MMHG | HEIGHT: 63 IN

## 2020-01-21 DIAGNOSIS — M79.18 MYOFASCIAL PAIN SYNDROME: Primary | ICD-10-CM

## 2020-01-21 DIAGNOSIS — M54.16 LUMBAR RADICULOPATHY: Chronic | ICD-10-CM

## 2020-01-21 DIAGNOSIS — M62.838 TRAPEZIUS MUSCLE SPASM: ICD-10-CM

## 2020-01-21 DIAGNOSIS — M54.12 CERVICAL RADICULOPATHY: Chronic | ICD-10-CM

## 2020-01-21 PROCEDURE — 99214 OFFICE O/P EST MOD 30 MIN: CPT | Performed by: PHYSICIAN ASSISTANT

## 2020-01-21 RX ORDER — LIDOCAINE 50 MG/G
OINTMENT TOPICAL 3 TIMES DAILY PRN
Qty: 35.44 G | Refills: 4 | Status: SHIPPED | OUTPATIENT
Start: 2020-01-21 | End: 2021-02-17

## 2020-01-21 NOTE — PROGRESS NOTES
Assessment:  1  Myofascial pain syndrome    2  Trapezius muscle spasm    3  Cervical radiculopathy    4  Lumbar radiculopathy        Plan:  I had a lengthy discussion in office with patient today regarding her chronic pain syndrome symptoms and treatment plan options  While she does admit to some temporary relief from her 1st cervical epidural steroid injection completed on 12/23/2019 she is hesitant to schedule repeat injections at this time but would consider if her pain does worsen  She additionally expresses desire to complete EMG of upper extremities and February prior to scheduling further interventions  In regards to medication management we did discuss given her anxiety and depression that opiate therapy is not appropriate option for her  She has additionally currently on antianxiety medications which further limits membrane stabilizer medications we could use  We therefore discussed trying topical Lidocaine cream for pain and symptom relief in addition to home TENS unit for muscle spasms and myofascial pain  Patient expresses understanding and states she does try to avoid prescription medications if possible  I will additionally place order to aqua therapy for pain and symptom relief as previous aquatherapy completed last year provided her significant benefit  The patient will follow-up in 12 weeks for medication prescription refill and reevaluation  The patient was advised to contact the office should their symptoms worsen in the interim  The patient was agreeable and verbalized an understanding  History of Present Illness: The patient is a 38 y o  female last seen on 12/23/2019 who presents for a follow up office visit in regards to  chronic pain syndrome secondary to cervical and lumbar radiculopathy  The patient currently reports slightly worsening overall pain since last visit    She did undergo cervical epidural steroid injection on 12/23/2019 and did admit to some temporary relief of her overall pain specifically in the left upper extremity  She does admit to some increasing right upper extremity pain  She additionally notes that she did experience post injection migraine which Center to the ER  She states migraine did resolve with emergency room medications  She has been unable to complete EMG the upper extremities but is scheduled to complete in February  She has additionally followed up with her orthopedic surgeon regarding her bilateral shoulder pain and was again advised likely cervical source versus shoulder itself  She rates her pain as a 7/10 on pain scale and describes as a constant sharp burning pins and needle shooting stabbing pain  Patient is currently utilizing tumor rec for pain relief in addition to heating pad  She has tried ibuprofen but tries to avoid taking if possible  Current pain medications includes:  P r n  ibuprofen   The patient reports that this regimen is providing 20% pain relief  The patient is reporting no side effects from this pain medication regimen  Pain Contract Signed: NA  Last Urine Drug Screen: NA    I have personally reviewed and/or updated the patient's past medical history, past surgical history, family history, social history, current medications, allergies, and vital signs today  Review of Systems:    Review of Systems   Respiratory: Negative for shortness of breath  Cardiovascular: Negative for chest pain  Gastrointestinal: Positive for constipation, diarrhea and nausea  Negative for vomiting  Musculoskeletal: Positive for gait problem  Negative for arthralgias, joint swelling and myalgias  Skin: Negative for rash  Neurological: Positive for numbness  Negative for dizziness, seizures and weakness  All other systems reviewed and are negative          Past Medical History:   Diagnosis Date    Anxiety     Back injury     Cervical radiculopathy     Degenerative disc disease, cervical     pt states entire back    Depression     Head injury     hx of 4 concussions    IBS (irritable bowel syndrome)     Lumbar radiculopathy     Memory loss     Migraine     Neck injury     Sciatic nerve pain, left        Past Surgical History:   Procedure Laterality Date    SHOULDER SURGERY Right        Family History   Problem Relation Age of Onset    ALS Mother     Multiple sclerosis Father     Multiple sclerosis Sister     No Known Problems Brother     No Known Problems Sister     No Known Problems Brother        Social History     Occupational History    Not on file   Tobacco Use    Smoking status: Current Some Day Smoker     Packs/day: 0 50     Types: Cigarettes    Smokeless tobacco: Never Used   Substance and Sexual Activity    Alcohol use: Yes     Frequency: 2-4 times a month     Drinks per session: 1 or 2    Drug use: Never    Sexual activity: Yes     Partners: Male         Current Outpatient Medications:     ALPRAZolam (XANAX) 2 MG tablet, Take by mouth daily at bedtime as needed for anxiety, Disp: , Rfl:     DIGESTIVE ENZYMES PO, Take by mouth, Disp: , Rfl:     diphenhydrAMINE (BENADRYL) 25 mg capsule, Take 25 mg by mouth every 6 (six) hours as needed for itching, Disp: , Rfl:     FLUoxetine (PROzac) 20 mg capsule, Take 20 mg by mouth daily, Disp: , Rfl:     ibuprofen (MOTRIN) 200 mg tablet, Take 200 mg by mouth every 6 (six) hours as needed, Disp: , Rfl:     Lactobacillus (PROBIOTIC ACIDOPHILUS PO), Take by mouth, Disp: , Rfl:     metroNIDAZOLE (METROGEL) 0 75 % gel, Apply topically daily To face, Disp: 45 g, Rfl: 3    rosuvastatin (CRESTOR) 10 MG tablet, TAKE 1 TABLET BY MOUTH DAILY, Disp: 90 tablet, Rfl: 3    SUMAtriptan (IMITREX) 100 mg tablet, One p  O  At headache onset, may repeat after 2 hours p r n   Maximum 2/24 hours, Disp: 9 tablet, Rfl: 5    topiramate (TOPAMAX) 100 mg tablet, TAKE 1 TABLET BY MOUTH EVERY NIGHT AT BEDTIME, Disp: 90 tablet, Rfl: 0    Turmeric 500 MG CAPS, Take 1,500 mg by mouth 2 (two) times a day , Disp: , Rfl:     lidocaine (XYLOCAINE) 5 % ointment, Apply topically 3 (three) times a day as needed for mild pain, Disp: 35 44 g, Rfl: 4    Nerve Stimulator (STANDARD TENS) DAYNA, by Does not apply route 4 (four) times a day, Disp: 1 Device, Rfl: 0    Allergies   Allergen Reactions    Cyclobenzaprine Anxiety    Naproxen Anxiety       Physical Exam:    BP 98/60   Pulse 82   Resp 20   Ht 5' 3" (1 6 m)   Wt 78 kg (172 lb)   BMI 30 47 kg/m²     Constitutional:normal, well developed, well nourished, alert, in no distress and non-toxic and no overt pain behavior    Eyes:anicteric  HEENT:grossly intact  Neck:supple, symmetric, trachea midline and no masses   Pulmonary:even and unlabored  Cardiovascular:No edema or pitting edema present  Skin:Normal without rashes or lesions and well hydrated  Psychiatric:Mood and affect appropriate  Neurologic:Cranial Nerves II-XII grossly intact  Musculoskeletal:normal      Imaging  No orders to display         Orders Placed This Encounter   Procedures    Ambulatory referral to Physical Therapy

## 2020-02-28 ENCOUNTER — TELEPHONE (OUTPATIENT)
Dept: PAIN MEDICINE | Facility: MEDICAL CENTER | Age: 45
End: 2020-02-28

## 2020-02-28 NOTE — TELEPHONE ENCOUNTER
Pt called stating that she is having this odd feeling in her neck  Pt states that she feels like something is inside of her neck   Pt states this is something new since she has had the Injection     Pt can be reached at [phone]

## 2020-02-28 NOTE — TELEPHONE ENCOUNTER
Pt is s/p MARVIN 12/23  Pt states immediately after procedure she felt pressure which has not subsided  Pt states difficult to describe but it "feels like a lump inside neck" and "pressure " Pt states she did not call sooner because she thought it would eventually subside and had been using ice  Denies any obvious swelling or signs of infection    Please advise

## 2020-03-02 NOTE — TELEPHONE ENCOUNTER
Patient calling back to speak with Janene Parisi please call her back at your earliest convenience  Thank you       [phone]

## 2020-03-09 ENCOUNTER — OFFICE VISIT (OUTPATIENT)
Dept: PAIN MEDICINE | Facility: CLINIC | Age: 45
End: 2020-03-09
Payer: COMMERCIAL

## 2020-03-09 VITALS
HEIGHT: 63 IN | DIASTOLIC BLOOD PRESSURE: 82 MMHG | WEIGHT: 170.2 LBS | RESPIRATION RATE: 18 BRPM | HEART RATE: 80 BPM | BODY MASS INDEX: 30.16 KG/M2 | SYSTOLIC BLOOD PRESSURE: 118 MMHG

## 2020-03-09 DIAGNOSIS — G89.4 CHRONIC PAIN SYNDROME: Primary | ICD-10-CM

## 2020-03-09 DIAGNOSIS — M54.12 CERVICAL RADICULOPATHY: ICD-10-CM

## 2020-03-09 DIAGNOSIS — M47.816 LUMBAR SPONDYLOSIS: Primary | ICD-10-CM

## 2020-03-09 PROCEDURE — 99214 OFFICE O/P EST MOD 30 MIN: CPT | Performed by: ANESTHESIOLOGY

## 2020-03-09 PROCEDURE — 3008F BODY MASS INDEX DOCD: CPT | Performed by: ANESTHESIOLOGY

## 2020-03-09 PROCEDURE — 4004F PT TOBACCO SCREEN RCVD TLK: CPT | Performed by: ANESTHESIOLOGY

## 2020-03-09 NOTE — PROGRESS NOTES
today      Review of Systems   Musculoskeletal: Positive for gait problem  Decreased range of motion, muscle weakness, joint stiffness and pain in extremity   Neurological: Positive for dizziness         Patient Active Problem List   Diagnosis    Cervical radiculopathy    Cervical disc disorder with radiculopathy of mid-cervical region    Lumbar radiculopathy    Depression, major, recurrent ([address] )    Anxiety    Mixed hyperlipidemia    Chronic pain syndrome       Past Medical History:   Diagnosis Date    Anxiety     Back injury     Cervical radiculopathy     Degenerative disc disease, cervical     pt states entire back    Depression     Head injury     hx of 4 concussions    IBS (irritable bowel syndrome)     Lumbar radiculopathy     Memory loss     Migraine     Neck injury     Sciatic nerve pain, left        Past Surgical History:   Procedure Laterality Date    SHOULDER SURGERY Right        Family History   Problem Relation Age of Onset    ALS Mother     Multiple sclerosis Father     Multiple sclerosis Sister     No Known Problems Brother     No Known Problems Sister     No Known Problems Brother        Social History     Occupational History    Not on file   Tobacco Use    Smoking status: Current Some Day Smoker     Packs/day: 0 50     Types: Cigarettes    Smokeless tobacco: Never Used   Substance and Sexual Activity    Alcohol use: Yes     Frequency: 2-4 times a month     Drinks per session: 1 or 2    Drug use: Never    Sexual activity: Yes     Partners: Male       Current Outpatient Medications on File Prior to Visit   Medication Sig    ALPRAZolam (XANAX) 2 MG tablet Take by mouth daily at bedtime as needed for anxiety    DIGESTIVE ENZYMES PO Take by mouth    diphenhydrAMINE (BENADRYL) 25 mg capsule Take 25 mg by mouth every 6 (six) hours as needed for itching    FLUoxetine (PROzac) 20 mg capsule Take 20 mg by mouth daily    ibuprofen (MOTRIN) 200 mg tablet Take 200 mg by mouth every 6 (six) hours as needed    Lactobacillus (PROBIOTIC ACIDOPHILUS PO) Take by mouth    lidocaine (XYLOCAINE) 5 % ointment Apply topically 3 (three) times a day as needed for mild pain    metroNIDAZOLE (METROGEL) 0 75 % gel Apply topically daily To face    Nerve Stimulator (STANDARD TENS) DANYA by Does not apply route 4 (four) times a day    rosuvastatin (CRESTOR) 10 MG tablet TAKE 1 TABLET BY MOUTH DAILY    SUMAtriptan (IMITREX) 100 mg tablet One p  O  At headache onset, may repeat after 2 hours p r n  Maximum 2/24 hours    Turmeric 500 MG CAPS Take 1,500 mg by mouth 2 (two) times a day     [DISCONTINUED] topiramate (TOPAMAX) 100 mg tablet TAKE 1 TABLET BY MOUTH EVERY NIGHT AT BEDTIME     No current facility-administered medications on file prior to visit  Allergies   Allergen Reactions    Cyclobenzaprine Anxiety    Naproxen Anxiety       Physical Exam:    /82   Pulse 80   Resp 18   Ht 5' 3" (1 6 m)   Wt 77 2 kg (170 lb 3 2 oz)   BMI 30 15 kg/m²     Constitutional:normal, well developed, well nourished, alert, in no distress and non-toxic and no overt pain behavior    Eyes:anicteric  HEENT:grossly intact  Neck:supple, symmetric, trachea midline and no masses   Pulmonary:even and unlabored  Cardiovascular:No edema or pitting edema present  Skin:Normal without rashes or lesions and well hydrated  Psychiatric:Mood and affect appropriate  Neurologic:Cranial Nerves II-XII grossly intact  Musculoskeletal:normal    Imaging

## 2020-03-20 NOTE — TELEPHONE ENCOUNTER
Patient called requesting to reschedule her procedure scheduled on 4/9/20   Please advise, anabelle    Call back# [phone]

## 2020-03-31 ENCOUNTER — TELEMEDICINE (OUTPATIENT)
Dept: NEUROLOGY | Facility: CLINIC | Age: 45
End: 2020-03-31
Payer: MEDICARE

## 2020-03-31 ENCOUNTER — TELEPHONE (OUTPATIENT)
Dept: NEUROLOGY | Facility: CLINIC | Age: 45
End: 2020-03-31

## 2020-03-31 DIAGNOSIS — M54.12 CERVICAL RADICULOPATHY: ICD-10-CM

## 2020-03-31 DIAGNOSIS — M54.16 LUMBAR RADICULOPATHY: ICD-10-CM

## 2020-03-31 DIAGNOSIS — G43.009 MIGRAINE WITHOUT AURA AND WITHOUT STATUS MIGRAINOSUS, NOT INTRACTABLE: Primary | ICD-10-CM

## 2020-03-31 PROCEDURE — G2012 BRIEF CHECK IN BY MD/QHP: HCPCS | Performed by: PSYCHIATRY & NEUROLOGY

## 2020-03-31 NOTE — LETTER
March 31, 2020         Tracy Hebert       Dear Ms Rodrigez,    Appointment Missed: 3/31/2020      Appointment Scheduled with: Ashu Martell MD    We understand that many situations arise that occasionally prevents patients from keeping scheduled appointments  It is the policy of [facility] that patients notify us 24 hours in advance if unable to keep a scheduled appointment  Missed appointments jeopardize strong physician-patient relationships  The appointment you missed could have easily been made available to another patient if you had contacted us to cancel  We like to accommodate all of our patients, but when patients miss an appointment it prevents us from being able to help everyone  In the future, we request at least a 24 hour notice of cancellation so we can make your appointment available to someone else in need         Sincerely,    The Physicians and Staff of [facility]

## 2020-03-31 NOTE — TELEPHONE ENCOUNTER
Sp with pt stated that had problems with the internet service and was not able to use the phone till know her service is up know pt wanted to apologize about that pt having a reaction to the medication that she was given by Leidy Latham

## 2020-03-31 NOTE — PROGRESS NOTES
Virtual Regular Visit    Problem List Items Addressed This Visit        Nervous and Auditory    Cervical radiculopathy (Chronic)    Lumbar radiculopathy (Chronic)      Other Visit Diagnoses     Migraine without aura and without status migrainosus, not intractable    -  Primary        Plan:  Follow-up 3 months    Discussion:    Guanaco Sanchez did not tolerate Topamax due to various side effects which resolved when she discontinue the medication  We discussed various options for migraine prophylaxis including Botox injections, propranolol or gabapentin which she reports that she used in the past with success  At present she is not able to make a decision but will consider these and do some research and when she does make a decision she will notify me and will act on it  Otherwise I will plan to see her back in 3 months    Reason for visit is migraine headache    Encounter provider Lili Archer MD    Provider located at [place] [address]      Recent Visits  No visits were found meeting these conditions  Showing recent visits within past 7 days and meeting all other requirements     Today's Visits  Date Type Provider Dept   03/31/20 Telephone Lili Archer MD  [facility]   03/31/20 Telemedicine Lili Archer MD  [facility] [id number] today's visits and meeting all other requirements     Future Appointments  Date Type Provider Dept   03/31/20 Telephone Lili Archer MD  [facility]   Showing future appointments within next 150 days and meeting all other requirements        The patient was identified by name and date of birth   Mariam Conn was informed that this is a telemedicine visit and that the visit is being conducted through telephone conferencing (she was not able to get her video conference seeing working) in lieu of office visit in the face of ongoing [id number] viral epidemic  Patient understands that this format is not completely HIPPA compliant, however I am sitting in my office alone with the door closed and patient is in a comfortable environment to speak with me about current health issues She acknowledged consent and understanding of privacy and security of the video platform  The patient has agreed to participate and understands they can discontinue the visit at any time  Patient is aware this is a billable service  Subjective  Astrid Munson is a 38 y o  female Irwin Pierce was evaluated today by telephone as she was not able to get her video to work  She reports that she took the Topamax for a few months at 100 mg  She states that she noted tingling of her fingers and toes and also reported some gastrointestinal issues as well as amenorrhea  She states that within a week of discontinuing the medication the symptoms all resolved  She did not think to call me about these issues  She states that she did use the Imitrex when she had a headache following a cervical injection and she was told by the pain management doctor that she should only use it for migraine headaches not headaches associated with her injections  She states as a result she has not used it  She continues to get headaches daily lasting more than 4 hours for more than 4 months  She would like to try another prophylactic medication but is unsure what she would like to do  We discussed various options in detail  Her headaches continue lateralized more to the right side is a throbbing pain associated with photophobia, sonophobia nausea         Past Medical History:   Diagnosis Date    Anxiety     Back injury     Cervical radiculopathy     Degenerative disc disease, cervical     pt states entire back    Depression     Head injury     hx of 4 concussions    IBS (irritable bowel syndrome)     Lumbar radiculopathy     Memory loss     Migraine     Neck injury     Sciatic nerve pain, left        Past Surgical History:   Procedure Laterality Date    SHOULDER SURGERY Right        Current Outpatient Medications   Medication Sig Dispense Refill    ALPRAZolam (XANAX) 2 MG tablet Take by mouth 3 (three) times a day as needed for anxiety       diphenhydrAMINE (BENADRYL) 25 mg capsule Take 25 mg by mouth every 6 (six) hours as needed for itching      FLUoxetine (PROzac) 20 mg capsule Take 20 mg by mouth daily      ibuprofen (MOTRIN) 200 mg tablet Take 200 mg by mouth daily as needed       Lactobacillus (PROBIOTIC ACIDOPHILUS PO) Take by mouth      lidocaine (XYLOCAINE) 5 % ointment Apply topically 3 (three) times a day as needed for mild pain 35 44 g 4    metroNIDAZOLE (METROGEL) 0 75 % gel Apply topically daily To face 45 g 3    Nerve Stimulator (STANDARD TENS) DANYA by Does not apply route 4 (four) times a day 1 Device 0    rosuvastatin (CRESTOR) 10 MG tablet TAKE 1 TABLET BY MOUTH DAILY 90 tablet 3    DIGESTIVE ENZYMES PO Take by mouth      SUMAtriptan (IMITREX) 100 mg tablet One p  O  At headache onset, may repeat after 2 hours p r n  Maximum 2/24 hours (Patient not taking: Reported on 3/31/2020) 9 tablet 5     No current facility-administered medications for this visit  Allergies   Allergen Reactions    Cyclobenzaprine Anxiety    Naproxen Anxiety       Review of Systems   Constitutional: Negative  Negative for appetite change and fever  HENT: Negative  Negative for hearing loss, tinnitus, trouble swallowing and voice change  Eyes: Positive for photophobia and visual disturbance  Negative for pain  Respiratory: Negative  Negative for shortness of breath  Cardiovascular: Negative  Negative for palpitations  Gastrointestinal: Positive for constipation and diarrhea  Negative for nausea and vomiting  Endocrine: Negative  Negative for cold intolerance  Genitourinary: Negative  Negative for dysuria, frequency and urgency     Musculoskeletal: Positive for back pain, gait problem and neck pain  Negative for myalgias  Skin: Negative  Negative for rash  Neurological: Positive for weakness (Left sided), numbness and headaches  Negative for dizziness, tremors, seizures, syncope, facial asymmetry, speech difficulty and light-headedness  Hematological: Negative  Does not bruise/bleed easily  Psychiatric/Behavioral: Positive for sleep disturbance  Negative for confusion and hallucinations  I spent 20 minutes with the patient during this visit

## 2020-05-11 DIAGNOSIS — E78.2 MIXED HYPERLIPIDEMIA: Chronic | ICD-10-CM

## 2020-05-11 RX ORDER — ROSUVASTATIN CALCIUM 10 MG/1
TABLET, COATED ORAL
Qty: 30 TABLET | Refills: 3 | Status: SHIPPED | OUTPATIENT
Start: 2020-05-11 | End: 2020-05-11

## 2020-05-11 RX ORDER — ROSUVASTATIN CALCIUM 10 MG/1
TABLET, COATED ORAL
Qty: 90 TABLET | Refills: 1 | Status: SHIPPED | OUTPATIENT
Start: 2020-05-11 | End: 2021-02-05

## 2020-05-21 ENCOUNTER — TELEPHONE (OUTPATIENT)
Dept: RADIOLOGY | Facility: CLINIC | Age: 45
End: 2020-05-21

## 2020-08-06 ENCOUNTER — TELEPHONE (OUTPATIENT)
Dept: PAIN MEDICINE | Facility: CLINIC | Age: 45
End: 2020-08-06

## 2020-09-10 ENCOUNTER — TELEPHONE (OUTPATIENT)
Dept: INTERNAL MEDICINE CLINIC | Facility: CLINIC | Age: 45
End: 2020-09-10

## 2020-09-11 ENCOUNTER — OFFICE VISIT (OUTPATIENT)
Dept: INTERNAL MEDICINE CLINIC | Facility: CLINIC | Age: 45
End: 2020-09-11
Payer: COMMERCIAL

## 2020-09-11 VITALS
SYSTOLIC BLOOD PRESSURE: 114 MMHG | WEIGHT: 172 LBS | OXYGEN SATURATION: 93 % | TEMPERATURE: 97.4 F | DIASTOLIC BLOOD PRESSURE: 80 MMHG | BODY MASS INDEX: 30.47 KG/M2 | HEART RATE: 92 BPM

## 2020-09-11 DIAGNOSIS — E78.2 MIXED HYPERLIPIDEMIA: ICD-10-CM

## 2020-09-11 DIAGNOSIS — Z00.00 ADULT GENERAL MEDICAL EXAMINATION: Primary | ICD-10-CM

## 2020-09-11 DIAGNOSIS — K58.0 IRRITABLE BOWEL SYNDROME WITH DIARRHEA: ICD-10-CM

## 2020-09-11 DIAGNOSIS — Z12.31 SCREENING MAMMOGRAM, ENCOUNTER FOR: ICD-10-CM

## 2020-09-11 DIAGNOSIS — Z12.4 SCREENING FOR CERVICAL CANCER: ICD-10-CM

## 2020-09-11 DIAGNOSIS — S39.012A STRAIN OF LUMBAR REGION, INITIAL ENCOUNTER: ICD-10-CM

## 2020-09-11 DIAGNOSIS — J45.21 MILD INTERMITTENT REACTIVE AIRWAY DISEASE WITH ACUTE EXACERBATION: ICD-10-CM

## 2020-09-11 DIAGNOSIS — F33.42 RECURRENT MAJOR DEPRESSIVE DISORDER, IN FULL REMISSION (HCC): Chronic | ICD-10-CM

## 2020-09-11 PROCEDURE — 4004F PT TOBACCO SCREEN RCVD TLK: CPT | Performed by: INTERNAL MEDICINE

## 2020-09-11 PROCEDURE — 3725F SCREEN DEPRESSION PERFORMED: CPT | Performed by: INTERNAL MEDICINE

## 2020-09-11 PROCEDURE — 99214 OFFICE O/P EST MOD 30 MIN: CPT | Performed by: INTERNAL MEDICINE

## 2020-09-11 RX ORDER — ALBUTEROL SULFATE 90 UG/1
AEROSOL, METERED RESPIRATORY (INHALATION)
Qty: 18 G | Refills: 5 | Status: SHIPPED | OUTPATIENT
Start: 2020-09-11

## 2020-09-11 RX ORDER — TIZANIDINE 4 MG/1
4 TABLET ORAL EVERY 8 HOURS PRN
Qty: 60 TABLET | Refills: 0 | Status: SHIPPED | OUTPATIENT
Start: 2020-09-11 | End: 2020-09-28 | Stop reason: SDUPTHER

## 2020-09-11 RX ORDER — MONTELUKAST SODIUM 10 MG/1
10 TABLET ORAL
Qty: 30 TABLET | Refills: 5 | Status: SHIPPED | OUTPATIENT
Start: 2020-09-11 | End: 2020-09-12

## 2020-09-11 RX ORDER — PREDNISONE 10 MG/1
TABLET ORAL
Qty: 30 TABLET | Refills: 0 | Status: SHIPPED | OUTPATIENT
Start: 2020-09-11 | End: 2020-09-21

## 2020-09-11 RX ORDER — FLUTICASONE PROPIONATE 50 MCG
1 SPRAY, SUSPENSION (ML) NASAL DAILY
Qty: 16 G | Refills: 3 | Status: SHIPPED | OUTPATIENT
Start: 2020-09-11 | End: 2020-09-12

## 2020-09-11 NOTE — PATIENT INSTRUCTIONS

## 2020-09-11 NOTE — PROGRESS NOTES
ADULT ANNUAL [address]     NAME: Aidee Jha  AGE: 38 y o  SEX: female  : 1975     DATE: 2020     Assessment and Plan:     Problem List Items Addressed This Visit        Other    Mixed hyperlipidemia (Chronic)    Relevant Orders    Lipid Panel with Direct LDL reflex    Comprehensive metabolic panel    CBC    Depression, major, recurrent (HCC) (Chronic)      Other Visit Diagnoses     Adult general medical examination    -  Primary    Mild intermittent reactive airway disease with acute exacerbation        Relevant Medications    predniSONE 10 mg tablet    albuterol (Ventolin HFA) 90 mcg/act inhaler    Screening for cervical cancer        Relevant Orders    Ambulatory referral to Obstetrics / Gynecology    Screening mammogram, encounter for        Relevant Orders    Mammo screening bilateral w 3d & cad    Irritable bowel syndrome with diarrhea        Relevant Orders    Ambulatory referral to Gastroenterology    CBC    Strain of lumbar region, initial encounter        Relevant Medications    tiZANidine (ZANAFLEX) 4 mg tablet        For her post-nasal drip and respiratory complaints would recommend rescue inhaler, a course of steroids, montelukast, and flonase  She has strained her lumbar back muscles and there is evidence of spasm  Recommend heat and massage  Can use zanaflex prn  She would like to see GI due to IBS-D  Depression has been stable on prozac  Will continue at current dose  Cholesterol very high in past and started on crestor  Need to check effect of crestor on her lipid levels  Immunizations and preventive care screenings were discussed with patient today  Appropriate education was printed on patient's after visit summary  Counseling:  Alcohol/drug use: discussed moderation in alcohol intake, the recommendations for healthy alcohol use, and avoidance of illicit drug use    Dental Health: discussed importance of regular tooth brushing, flossing, and dental visits  Injury prevention: discussed safety/seat belts, safety helmets, smoke detectors, carbon dioxide detectors, and smoking near bedding or upholstery  · Sexual health: discussed sexually transmitted diseases, partner selection, use of condoms, avoidance of unintended pregnancy, and contraceptive alternatives  BMI Counseling: Body mass index is 30 47 kg/m²  The BMI is above normal  Nutrition recommendations include decreasing portion sizes, encouraging healthy choices of fruits and vegetables, limiting drinks that contain sugar, moderation in carbohydrate intake and increasing intake of lean protein  Exercise recommendations include exercising 3-5 times per week  Tobacco Cessation Counseling: Tobacco cessation counseling was provided  The patient is sincerely urged to quit consumption of tobacco  She is not ready to quit tobacco        No follow-ups on file  Chief Complaint:     Chief Complaint   Patient presents with    Physical Exam      History of Present Illness:     Adult Annual Physical   Patient here for a comprehensive physical exam  The patient reports multiple problems  Congestion in her chest ongoing for months, but getting worse recently  States she gets a lot of post-nasal drip  Not using nasal spray  More recently, her chest started feeling tighter along with intermittent wheezing  She is a smoker but has cut down  No PFTs  Depression has been stable  Struggles with chronic pain mainly in her back  Has seen pain management in past and had injections  Had a fall related to walking her dog and her left side of her back has been bothering her a little since that time  Diet and Physical Activity  · Diet/Nutrition: limited fruits/vegetables and high calorie diet  General Health  · Sleep: gets 4-6 hours of sleep on average  · Hearing: normal - bilateral   · Vision: goes for regular eye exams     · Dental: regular dental visits  Review of Systems:     Review of Systems   Constitutional: Positive for fatigue  Negative for appetite change, chills and fever  HENT: Positive for congestion, postnasal drip and sore throat  Negative for hearing loss, rhinorrhea, tinnitus and trouble swallowing  Eyes: Negative for pain, discharge, redness and visual disturbance  Respiratory: Positive for chest tightness and wheezing  Negative for cough and shortness of breath  Cardiovascular: Negative for chest pain, palpitations and leg swelling  Gastrointestinal: Positive for diarrhea  Negative for abdominal distention, abdominal pain, blood in stool, constipation, nausea and vomiting  Endocrine: Negative for cold intolerance, heat intolerance, polydipsia, polyphagia and polyuria  Genitourinary: Negative for difficulty urinating, dysuria, frequency, hematuria and urgency  Musculoskeletal: Positive for back pain  Negative for arthralgias, gait problem, joint swelling, myalgias, neck pain and neck stiffness  Skin: Negative for color change and rash  Neurological: Negative for dizziness, tremors, seizures, syncope, speech difficulty, weakness, light-headedness, numbness and headaches  Hematological: Negative for adenopathy  Does not bruise/bleed easily  Psychiatric/Behavioral: Negative for agitation, behavioral problems, confusion, hallucinations, sleep disturbance and suicidal ideas  The patient is not nervous/anxious         Past Medical History:     Past Medical History:   Diagnosis Date    Anxiety     Back injury     Cervical radiculopathy     Degenerative disc disease, cervical     pt states entire back    Depression     Head injury     hx of 4 concussions    IBS (irritable bowel syndrome)     Lumbar radiculopathy     Memory loss     Migraine     Neck injury     Sciatic nerve pain, left       Past Surgical History:     Past Surgical History:   Procedure Laterality Date    SHOULDER SURGERY Right Social History:     E-Cigarette/Vaping    E-Cigarette Use Never User      E-Cigarette/Vaping Substances    Nicotine No     THC No     CBD No     Flavoring No     Other No     Unknown No      Social History     Socioeconomic History    Marital status: /Civil Union     Spouse name: None    Number of children: None    Years of education: None    Highest education level: None   Occupational History    None   Social Needs    Financial resource strain: None    Food insecurity     Worry: None     Inability: None    Transportation needs     Medical: None     Non-medical: None   Tobacco Use    Smoking status: Current Some Day Smoker     Packs/day: 0 50     Years: 15 00     Pack years: 7 50     Types: Cigarettes    Smokeless tobacco: Never Used   Substance and Sexual Activity    Alcohol use: Yes     Frequency: 2-4 times a month     Drinks per session: 1 or 2     Binge frequency: Never    Drug use: Never    Sexual activity: Yes     Partners: Male   Lifestyle    Physical activity     Days per week: 2 days     Minutes per session: 10 min    Stress: Very much   Relationships    Social connections     Talks on phone: None     Gets together: None     Attends Mandaen service: None     Active member of club or organization: None     Attends meetings of clubs or organizations: None     Relationship status: None    Intimate partner violence     Fear of current or ex partner: None     Emotionally abused: None     Physically abused: None     Forced sexual activity: None   Other Topics Concern    None   Social History Narrative    None      Family History:     Family History   Problem Relation Age of Onset    ALS Mother     Multiple sclerosis Father     Multiple sclerosis Sister     No Known Problems Brother     No Known Problems Sister     No Known Problems Brother       Current Medications:     Current Outpatient Medications   Medication Sig Dispense Refill    ALPRAZolam (XANAX) 2 MG tablet Take by mouth 3 (three) times a day as needed for anxiety       diphenhydrAMINE (BENADRYL) 25 mg capsule Take 25 mg by mouth every 6 (six) hours as needed for itching      FLUoxetine (PROzac) 20 mg capsule Take 30 mg by mouth daily       ibuprofen (MOTRIN) 200 mg tablet Take 200 mg by mouth daily as needed       Lactobacillus (PROBIOTIC ACIDOPHILUS PO) Take by mouth as needed       lidocaine (XYLOCAINE) 5 % ointment Apply topically 3 (three) times a day as needed for mild pain 35 44 g 4    rosuvastatin (CRESTOR) 10 MG tablet TAKE 1 TABLET BY MOUTH EVERY DAY 90 tablet 1    DIGESTIVE ENZYMES PO Take by mouth      metroNIDAZOLE (METROGEL) 0 75 % gel Apply topically daily To face (Patient not taking: Reported on 9/10/2020) 45 g 3    Nerve Stimulator (STANDARD TENS) DANYA by Does not apply route 4 (four) times a day (Patient not taking: Reported on 9/10/2020) 1 Device 0    SUMAtriptan (IMITREX) 100 mg tablet One p  O  At headache onset, may repeat after 2 hours p r n  Maximum 2/24 hours (Patient not taking: Reported on 3/31/2020) 9 tablet 5     No current facility-administered medications for this visit  Allergies: Allergies   Allergen Reactions    Topamax [Topiramate] Headache     Severe HA and body aches      Cyclobenzaprine Anxiety    Naproxen Anxiety      Physical Exam:     /80 (BP Location: Left arm, Patient Position: Sitting, Cuff Size: Standard)   Pulse 92   Temp (!) 97 4 °F (36 3 °C) (Temporal) Comment: NO NSAIDS  Wt 78 kg (172 lb) Comment: w/ shoes denied off  SpO2 93%   BMI 30 47 kg/m²     Physical Exam  Vitals signs reviewed  Constitutional:       General: She is not in acute distress  Appearance: She is well-developed  She is obese  She is not diaphoretic     HENT:      Right Ear: Tympanic membrane, ear canal and external ear normal       Left Ear: Tympanic membrane, ear canal and external ear normal       Mouth/Throat:      Mouth: Mucous membranes are moist       Pharynx: No oropharyngeal exudate or posterior oropharyngeal erythema  Eyes:      General: No scleral icterus  Right eye: No discharge  Left eye: No discharge  Conjunctiva/sclera: Conjunctivae normal    Neck:      Musculoskeletal: Neck supple  Thyroid: No thyromegaly  Vascular: No JVD  Cardiovascular:      Rate and Rhythm: Normal rate and regular rhythm  Heart sounds: Normal heart sounds  No murmur  Pulmonary:      Effort: Pulmonary effort is normal  No respiratory distress  Breath sounds: Decreased air movement present  Wheezing present  No rales  Abdominal:      General: Bowel sounds are normal  There is no distension  Palpations: Abdomen is soft  Tenderness: There is no abdominal tenderness  Musculoskeletal:         General: Tenderness (left lumbar paraspinal muscles) and deformity (lumbar paraspinal hypertonicity (left > right)) present  Right lower leg: No edema  Left lower leg: No edema  Lymphadenopathy:      Cervical: No cervical adenopathy  Skin:     General: Skin is warm and dry  Neurological:      General: No focal deficit present  Mental Status: She is alert  Sensory: No sensory deficit  Motor: No weakness        Gait: Gait normal    Psychiatric:         Mood and Affect: Mood normal          Behavior: Behavior normal           [facility] [address]

## 2020-09-12 RX ORDER — FLUTICASONE PROPIONATE 50 MCG
SPRAY, SUSPENSION (ML) NASAL
Qty: 48 G | Refills: 1 | Status: SHIPPED | OUTPATIENT
Start: 2020-09-12 | End: 2021-10-18

## 2020-09-12 RX ORDER — MONTELUKAST SODIUM 10 MG/1
TABLET ORAL
Qty: 90 TABLET | Refills: 1 | Status: SHIPPED | OUTPATIENT
Start: 2020-09-12 | End: 2021-03-08

## 2020-09-28 ENCOUNTER — OFFICE VISIT (OUTPATIENT)
Dept: INTERNAL MEDICINE CLINIC | Facility: CLINIC | Age: 45
End: 2020-09-28
Payer: COMMERCIAL

## 2020-09-28 VITALS
TEMPERATURE: 97.8 F | HEART RATE: 94 BPM | WEIGHT: 171.6 LBS | DIASTOLIC BLOOD PRESSURE: 72 MMHG | BODY MASS INDEX: 30.4 KG/M2 | SYSTOLIC BLOOD PRESSURE: 124 MMHG | OXYGEN SATURATION: 98 %
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
## 2020-09-28 DIAGNOSIS — G89.4 CHRONIC PAIN SYNDROME: ICD-10-CM

## 2020-09-28 PROCEDURE — 99214 OFFICE O/P EST MOD 30 MIN: CPT | Performed by: INTERNAL MEDICINE

## 2020-09-28 RX ORDER — TIZANIDINE 4 MG/1
4 TABLET ORAL EVERY 8 HOURS PRN
Qty: 60 TABLET | Refills: 2 | Status: SHIPPED | OUTPATIENT
Start: 2020-09-28 | End: 2021-07-26
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
## 2020-09-29 ENCOUNTER — TELEPHONE (OUTPATIENT)
Dept: INTERNAL MEDICINE CLINIC | Facility: CLINIC | Age: 45
End: 2020-09-29

## 2020-10-08 ENCOUNTER — TELEPHONE (OUTPATIENT)
Dept: INTERNAL MEDICINE CLINIC | Facility: CLINIC | Age: 45
End: 2020-10-08

## 2020-10-08 DIAGNOSIS — M25.562 PAIN IN BOTH KNEES, UNSPECIFIED CHRONICITY: Primary | ICD-10-CM

## 2020-10-08 DIAGNOSIS — M25.561 PAIN IN BOTH KNEES, UNSPECIFIED CHRONICITY: Primary | ICD-10-CM

## 2020-10-23 ENCOUNTER — OFFICE VISIT (OUTPATIENT)
Dept: PAIN MEDICINE | Facility: CLINIC | Age: 45
End: 2020-10-23
Payer: COMMERCIAL

## 2020-10-23 VITALS
RESPIRATION RATE: 18 BRPM | HEART RATE: 84 BPM | BODY MASS INDEX: 30.33 KG/M2 | TEMPERATURE: 97.8 F | HEIGHT: 63 IN | WEIGHT: 171.2 LBS | SYSTOLIC BLOOD PRESSURE: 106 MMHG | DIASTOLIC BLOOD PRESSURE: 71 MMHG

## 2020-10-23 DIAGNOSIS — G89.4 CHRONIC PAIN SYNDROME: Primary | ICD-10-CM

## 2020-10-23 DIAGNOSIS — M54.12 CERVICAL RADICULOPATHY: Chronic | ICD-10-CM

## 2020-10-23 DIAGNOSIS — M54.16 LUMBAR RADICULOPATHY: Chronic | ICD-10-CM

## 2020-10-23 DIAGNOSIS — M50.120 CERVICAL DISC DISORDER WITH RADICULOPATHY OF MID-CERVICAL REGION: ICD-10-CM

## 2020-10-23 PROCEDURE — 99214 OFFICE O/P EST MOD 30 MIN: CPT | Performed by: NURSE PRACTITIONER

## 2020-10-23 RX ORDER — IBUPROFEN 800 MG/1
TABLET ORAL
Qty: 90 TABLET | Refills: 0 | Status: SHIPPED | OUTPATIENT
Start: 2020-10-23

## 2020-10-28 ENCOUNTER — TELEPHONE (OUTPATIENT)
Dept: RADIOLOGY | Facility: CLINIC | Age: 45
End: 2020-10-28

## 2020-11-18 ENCOUNTER — TELEPHONE (OUTPATIENT)
Dept: INTERNAL MEDICINE CLINIC | Facility: CLINIC | Age: 45
End: 2020-11-18

## 2021-01-25 ENCOUNTER — OFFICE VISIT (OUTPATIENT)
Dept: OBGYN CLINIC | Facility: CLINIC | Age: 46
End: 2021-01-25
Payer: COMMERCIAL

## 2021-01-25 VITALS
WEIGHT: 166.6 LBS | BODY MASS INDEX: 28.44 KG/M2 | SYSTOLIC BLOOD PRESSURE: 124 MMHG | DIASTOLIC BLOOD PRESSURE: 88 MMHG | HEIGHT: 64 IN

## 2021-01-25 DIAGNOSIS — N76.0 ACUTE VAGINITIS: ICD-10-CM

## 2021-01-25 DIAGNOSIS — R10.2 PELVIC PAIN: Primary | ICD-10-CM

## 2021-01-25 DIAGNOSIS — Z11.3 SCREENING EXAMINATION FOR STD (SEXUALLY TRANSMITTED DISEASE): ICD-10-CM

## 2021-01-25 PROCEDURE — 99203 OFFICE O/P NEW LOW 30 MIN: CPT | Performed by: NURSE PRACTITIONER

## 2021-01-25 PROCEDURE — 87591 N.GONORRHOEAE DNA AMP PROB: CPT | Performed by: NURSE PRACTITIONER

## 2021-01-25 PROCEDURE — 3008F BODY MASS INDEX DOCD: CPT | Performed by: NURSE PRACTITIONER

## 2021-01-25 PROCEDURE — 4004F PT TOBACCO SCREEN RCVD TLK: CPT | Performed by: NURSE PRACTITIONER

## 2021-01-25 PROCEDURE — 87491 CHLMYD TRACH DNA AMP PROBE: CPT | Performed by: NURSE PRACTITIONER

## 2021-01-25 RX ORDER — METRONIDAZOLE 7.5 MG/G
1 GEL VAGINAL
Qty: 45 G | Refills: 0 | Status: SHIPPED | OUTPATIENT
Start: 2021-01-25 | End: 2021-01-30

## 2021-01-25 NOTE — PATIENT INSTRUCTIONS
Pelvic Pain in Women   AMBULATORY CARE:   Pelvic pain  may happen on one or both sides of your pelvis  Pelvic pain may occur with certain body positions or activities, such as when you have sex or a bowel movement  It may worsen during your monthly period or after you sit or stand for a long time  Chronic pelvic pain is pain that continues for longer than 6 months  Call 911 for any of the following:   · You have severe chest pain and sudden trouble breathing  Seek care immediately if:   · You have heavy or unusual vaginal bleeding, and you feel lightheaded or faint  Contact your healthcare provider if:   · You have pelvic pain that does not go away after you take pain medicine  · You develop new symptoms or your symptoms are worse than before  · You have questions or concerns about your condition or care  Medicines: You may need any of the following:  · Pain medicine  may be given in pills or creams to relieve your pain  · Hormones  may be given if your pain gets worse with your menstrual cycle  · Antibiotics  may be given if your pain is caused by infection  · Take your medicine as directed  Contact your healthcare provider if you think your medicine is not helping or if you have side effects  Tell him or her if you are allergic to any medicine  Keep a list of the medicines, vitamins, and herbs you take  Include the amounts, and when and why you take them  Bring the list or the pill bottles to follow-up visits  Carry your medicine list with you in case of an emergency  Self-care:   · Keep a pain diary  Write down when your pain happens, how severe it is, and any other symptoms you have with your pain  A diary will help you keep track of pain cycles  It may also help your healthcare provider find out what is causing your pain  · Learn ways to relax  Deep breathing, meditation, and relaxation techniques can help decrease your pain   When you are tense, your pain may increase  · Change the foods you eat if you have irritable bowel syndrome  Ask your healthcare provider about the best foods for you  Follow up with your healthcare provider as directed:  Write down your questions so you remember to ask them during your visits  © Copyright [address] Information is for End User's use only and may not be sold, redistributed or otherwise used for commercial purposes  All illustrations and images included in CareNotes® are the copyrighted property of A D A M , Inc  or [facility] Johnathan Galvin   The above information is an  only  It is not intended as medical advice for individual conditions or treatments  Talk to your doctor, nurse or pharmacist before following any medical regimen to see if it is safe and effective for you

## 2021-01-25 NOTE — PROGRESS NOTES
Diagnoses and all orders for this visit:    Pelvic pain  -     US pelvis complete w transvaginal; Future    Acute vaginitis  -     metroNIDAZOLE (METROGEL) 0 75 % vaginal gel; Insert 1 application into the vagina daily at bedtime for 5 days      Call if no symptom improvement, ER if pain worsens to r/o diverticulitis, all questions answered, return for annual     Pleasant 54 y o  here for vaginal complaints of yellow discharge and odor  She is in a monogamous relationship but would like STD testing  She has had LLQ pain x 2 months  She does admit to IBS history  She denies any treatments tried  Denies recent antibiotic use  Denies douching  Denies fever  Denies new sexual partners  She has skipped her last 2 cycles and has done 2 pregnancy tests which were negative  Last IC in 11/2020  She reports a hx of ovarian cysts  Last Pap nml in 2019 but showed inflammmation  She has a hx of genital warts and HSV  She would like to be pregnant and was teary about her 2 TOPs yrs ago  Her  does not know she is trying to become pregnant      Past Medical History:   Diagnosis Date    Anxiety     Back injury     Cervical radiculopathy     Degenerative disc disease, cervical     pt states entire back    Depression     Head injury     hx of 4 concussions    High cholesterol     IBS (irritable bowel syndrome)     Lumbar radiculopathy     Memory loss     Migraine     Neck injury     Sciatic nerve pain, left      Past Surgical History:   Procedure Laterality Date    SHOULDER SURGERY Right      Social History     Tobacco Use    Smoking status: Current Some Day Smoker     Packs/day: 0 50     Years: 15 00     Pack years: 7 50     Types: Cigarettes    Smokeless tobacco: Never Used   Substance Use Topics    Alcohol use: Yes     Frequency: 2-4 times a month     Drinks per session: 1 or 2     Binge frequency: Never    Drug use: Never     Family History   Problem Relation Age of Onset    ALS Mother     Multiple sclerosis Father     Multiple sclerosis Sister     No Known Problems Brother     No Known Problems Sister     No Known Problems Brother     Breast cancer Maternal Aunt     Breast cancer Paternal Aunt        Current Outpatient Medications:     albuterol (Ventolin HFA) 90 mcg/act inhaler, 1-2 puffs inhaled every 4 to 6 hours as needed for wheezing or shortness of breath, Disp: 18 g, Rfl: 5    ALPRAZolam (XANAX) 2 MG tablet, Take by mouth 3 (three) times a day as needed for anxiety , Disp: , Rfl:     DIGESTIVE ENZYMES PO, Take by mouth daily , Disp: , Rfl:     diphenhydrAMINE (BENADRYL) 25 mg capsule, Take 25 mg by mouth every 6 (six) hours as needed for itching, Disp: , Rfl:     FLUoxetine (PROzac) 20 mg capsule, Take 30 mg by mouth daily , Disp: , Rfl:     fluticasone (FLONASE) 50 mcg/act nasal spray, INSTILL 1 SPRAY IN EACH NOSTRIL DAILY, Disp: 48 g, Rfl: 1    ibuprofen (MOTRIN) 800 mg tablet, Take 1 tab by mouth every 8 hours as needed for pain, Disp: 90 tablet, Rfl: 0    rosuvastatin (CRESTOR) 10 MG tablet, TAKE 1 TABLET BY MOUTH EVERY DAY, Disp: 90 tablet, Rfl: 1    SUMAtriptan (IMITREX) 100 mg tablet, One p  O  At headache onset, may repeat after 2 hours p r n   Maximum 2/24 hours, Disp: 9 tablet, Rfl: 5    Lactobacillus (PROBIOTIC ACIDOPHILUS PO), Take by mouth as needed , Disp: , Rfl:     lidocaine (XYLOCAINE) 5 % ointment, Apply topically 3 (three) times a day as needed for mild pain (Patient not taking: Reported on 1/25/2021), Disp: 35 44 g, Rfl: 4    metroNIDAZOLE (METROGEL) 0 75 % gel, Apply topically daily To face (Patient not taking: Reported on 1/25/2021), Disp: 45 g, Rfl: 3    metroNIDAZOLE (METROGEL) 0 75 % vaginal gel, Insert 1 application into the vagina daily at bedtime for 5 days, Disp: 45 g, Rfl: 0    montelukast (SINGULAIR) 10 mg tablet, TAKE 1 TABLET BY MOUTH EVERY NIGHT AT BEDTIME (Patient not taking: Reported on 1/25/2021), Disp: 90 tablet, Rfl: 1    tiZANidine ([facility]) 4 mg tablet, Take 1 tablet (4 mg total) by mouth every 8 (eight) hours as needed for muscle spasms (Patient not taking: Reported on 2021), Disp: 60 tablet, Rfl: 2    Allergies   Allergen Reactions    Topamax [Topiramate] Headache     Severe HA and body aches      Cyclobenzaprine Anxiety    Naproxen Anxiety     OB History    Para Term  AB Living   2 0 0 0 1 0   SAB TAB Ectopic Multiple Live Births   1 0 0 0 0      # Outcome Date GA Lbr [place]/2nd Weight Sex Delivery Anes PTL Lv   2             1 SAB                Vitals:    21 1556   BP: 124/88   BP Location: Left arm   Patient Position: Sitting   Cuff Size: Standard   Weight: 75 6 kg (166 lb 9 6 oz)   Height: 5' 4" (1 626 m)     Body mass index is 28 6 kg/m²  Review of Systems   Constitutional: Negative for chills, fatigue, fever and unexpected weight change  Respiratory: Negative for shortness of breath  Gastrointestinal: Negative for anal bleeding, blood in stool, constipation and diarrhea  Genitourinary: Negative for difficulty urinating, dysuria and hematuria  Physical Exam   Constitutional: She appears well-developed and well-nourished  No distress  Alert and oriented  HENT: atraumatic  Head: Normocephalic  Neck: Normal range of motion  Neck supple  Pulmonary: Effort normal   Abdominal: Soft  Pelvic exam was performed with patient supine  No labial fusion  There is no rash, tenderness, lesion or injury on the right labia  There is no rash, tenderness, lesion or injury on the left labia  Urethral meatus does not show any tenderness, inflammation or discharge  Palpation of midline bladder without pain or discomfort  Uterus is not deviated, not enlarged, not fixed and not tender  Cervix exhibits no motion tenderness, no discharge and no friability  Right adnexum displays no mass, no tenderness and no fullness  Left adnexum displays no mass, ++tenderness but no fullness  No erythema or tenderness in the vagina  No foreign body in the vagina  No signs of injury around the vagina  Small amount of vaginal discharge found  Perineum and anus without areas of injury  No lesions noted or swelling  Lymphadenopathy:        Right: No inguinal adenopathy present  Left: No inguinal adenopathy present

## 2021-01-28 LAB
C TRACH DNA SPEC QL NAA+PROBE: NEGATIVE
N GONORRHOEA DNA SPEC QL NAA+PROBE: NEGATIVE

## 2021-01-29 ENCOUNTER — TELEPHONE (OUTPATIENT)
Dept: INTERNAL MEDICINE CLINIC | Facility: CLINIC | Age: 46
End: 2021-01-29

## 2021-01-29 ENCOUNTER — LAB (OUTPATIENT)
Dept: LAB | Facility: HOSPITAL | Age: 46
End: 2021-01-29
Attending: INTERNAL MEDICINE
Payer: COMMERCIAL

## 2021-01-29 ENCOUNTER — HOSPITAL ENCOUNTER (OUTPATIENT)
Dept: ULTRASOUND IMAGING | Facility: HOSPITAL | Age: 46
Discharge: HOME | End: 2021-01-29
Payer: COMMERCIAL

## 2021-01-29 DIAGNOSIS — K58.0 IRRITABLE BOWEL SYNDROME WITH DIARRHEA: ICD-10-CM

## 2021-01-29 DIAGNOSIS — E78.2 MIXED HYPERLIPIDEMIA: ICD-10-CM

## 2021-01-29 DIAGNOSIS — R10.2 PELVIC PAIN: ICD-10-CM

## 2021-01-29 LAB
ALBUMIN SERPL BCP-MCNC: 3.9 G/DL (ref 3.5–5)
ALP SERPL-CCNC: 55 U/L (ref 46–116)
ALT SERPL W P-5'-P-CCNC: 26 U/L (ref 12–78)
ANION GAP SERPL CALCULATED.3IONS-SCNC: 9 MMOL/L (ref 4–13)
AST SERPL W P-5'-P-CCNC: 21 U/L (ref 5–45)
BILIRUB SERPL-MCNC: 0.4 MG/DL (ref 0.2–1)
BUN SERPL-MCNC: 10 MG/DL (ref 5–25)
CALCIUM SERPL-MCNC: 10.1 MG/DL (ref 8.3–10.1)
CHLORIDE SERPL-SCNC: 102 MMOL/L (ref 100–108)
CHOLEST SERPL-MCNC: 181 MG/DL (ref 50–200)
CO2 SERPL-SCNC: 29 MMOL/L (ref 21–32)
CREAT SERPL-MCNC: 0.78 MG/DL (ref 0.6–1.3)
ERYTHROCYTE [DISTWIDTH] IN BLOOD BY AUTOMATED COUNT: 12.7 % (ref 11.6–15.1)
GFR SERPL CREATININE-BSD FRML MDRD: 91 ML/MIN/1.73SQ M
GLUCOSE P FAST SERPL-MCNC: 97 MG/DL (ref 65–99)
HCT VFR BLD AUTO: 43.8 % (ref 34.8–46.1)
HDLC SERPL-MCNC: 55 MG/DL
HGB BLD-MCNC: 14 G/DL (ref 11.5–15.4)
LDLC SERPL CALC-MCNC: 91 MG/DL (ref 0–100)
MCH RBC QN AUTO: 31.2 PG (ref 26.8–34.3)
MCHC RBC AUTO-ENTMCNC: 32 G/DL (ref 31.4–37.4)
MCV RBC AUTO: 98 FL (ref 82–98)
PLATELET # BLD AUTO: 361 THOUSANDS/UL (ref 149–390)
PMV BLD AUTO: 10.8 FL (ref 8.9–12.7)
POTASSIUM SERPL-SCNC: 4.3 MMOL/L (ref 3.5–5.3)
PROT SERPL-MCNC: 8.4 G/DL (ref 6.4–8.2)
RBC # BLD AUTO: 4.49 MILLION/UL (ref 3.81–5.12)
SODIUM SERPL-SCNC: 140 MMOL/L (ref 136–145)
TRIGL SERPL-MCNC: 177 MG/DL
WBC # BLD AUTO: 21.5 THOUSAND/UL (ref 4.31–10.16)

## 2021-01-29 PROCEDURE — 80061 LIPID PANEL: CPT

## 2021-01-29 PROCEDURE — 76856 US EXAM PELVIC COMPLETE: CPT

## 2021-01-29 PROCEDURE — 85027 COMPLETE CBC AUTOMATED: CPT

## 2021-01-29 PROCEDURE — 36415 COLL VENOUS BLD VENIPUNCTURE: CPT

## 2021-01-29 PROCEDURE — 76830 TRANSVAGINAL US NON-OB: CPT

## 2021-01-29 PROCEDURE — 80053 COMPREHEN METABOLIC PANEL: CPT

## 2021-01-29 NOTE — TELEPHONE ENCOUNTER
----- Message from Giana Nascimento DO sent at 1/29/2021 11:36 AM EST -----  Call patient and let her know that her white blood cell count is pretty elevated which could indicate infection  I saw that she saw the GYN the other day regarding left lower quadrant or pelvic pain  How is her pain doing?  Is she still having left lower quadrant pain as that can be associated with diverticulitis which is inflammation in the colon

## 2021-02-04 ENCOUNTER — OFFICE VISIT (OUTPATIENT)
Dept: GASTROENTEROLOGY | Facility: CLINIC | Age: 46
End: 2021-02-04
Payer: COMMERCIAL

## 2021-02-04 VITALS
SYSTOLIC BLOOD PRESSURE: 124 MMHG | WEIGHT: 164.38 LBS | BODY MASS INDEX: 28.21 KG/M2 | HEART RATE: 95 BPM | DIASTOLIC BLOOD PRESSURE: 82 MMHG

## 2021-02-04 DIAGNOSIS — R10.13 EPIGASTRIC PAIN: ICD-10-CM

## 2021-02-04 DIAGNOSIS — K58.2 IRRITABLE BOWEL SYNDROME WITH BOTH CONSTIPATION AND DIARRHEA: ICD-10-CM

## 2021-02-04 DIAGNOSIS — D72.828 OTHER ELEVATED WHITE BLOOD CELL (WBC) COUNT: ICD-10-CM

## 2021-02-04 DIAGNOSIS — K21.9 GASTROESOPHAGEAL REFLUX DISEASE, UNSPECIFIED WHETHER ESOPHAGITIS PRESENT: ICD-10-CM

## 2021-02-04 DIAGNOSIS — R10.30 LOWER ABDOMINAL PAIN: Primary | ICD-10-CM

## 2021-02-04 PROCEDURE — 99204 OFFICE O/P NEW MOD 45 MIN: CPT | Performed by: PHYSICIAN ASSISTANT

## 2021-02-04 RX ORDER — HYOSCYAMINE SULFATE 0.125 MG
0.12 TABLET,DISINTEGRATING ORAL EVERY 4 HOURS PRN
Qty: 60 TABLET | Refills: 2 | Status: SHIPPED | OUTPATIENT
Start: 2021-02-04 | End: 2022-04-12 | Stop reason: ALTCHOICE

## 2021-02-04 RX ORDER — OMEPRAZOLE 20 MG/1
40 TABLET, DELAYED RELEASE ORAL DAILY
Qty: 60 TABLET | Refills: 2 | Status: SHIPPED | OUTPATIENT
Start: 2021-02-04 | End: 2021-05-20

## 2021-02-04 NOTE — PROGRESS NOTES
[facility] - Outpatient Consultation  Soy Vela 54 y o  female MRN: [id number]  Encounter: [id number]          ASSESSMENT AND PLAN:      1  Lower abdominal pain  2  Other elevated white blood cell (WBC) count  3  Irritable bowel syndrome with both constipation and diarrhea  - She has been diagnosed with IBS-mixed in the past but present with worsening symptoms including generalized abdominal pain, watery diarrhea 3-4 times daily, and she recently had a leukocytosis of 21 5 on bloodwork  - Will rule out infectious causes including Cdiff, enteric panel, O+P, giardia  - Schedule for EGD/colonoscopy  - Start anaspaz PRN for abdominal cramping  - If her stool studies are unremarkable she may benefit from a rifaximin course for IBS-D      4  Epigastric pain  5  Gastroesophageal reflux disease, unspecified whether esophagitis present  - She has a history of GERD and gastritis on a prior EGD and reports uncontrolled symptoms of postprandial epigastric pain, heartburn, and regurgitation  - Start omeprazole 40 mg daily  - Anti-reflux diet  - Plan for EGD at the time of her colonoscopy to rule out PUD, reflux esophagitis, celiac disease    Follow up pending EGD/colonoscopy findings  ______________________________________________________________________    HPI:  Mercedes Barriga is a pleasant 55 yo F with a PMH of depression/anxiety, chronic back pain, IBS-mixed, presenting due to worsening abdominal pain in the epigastric region, L side of the abdomen, and bilateral lower quadrants over the past several months along with 3-4 episodes of urgent diarrhea daily  She has postprandial epigastric pain along with regurgitation, belching, and heartburn  She denies melena or hematochezia  She reports that her abdominal pain and symptoms are exacerbated by eating so she has not been eating as much  She is losing 1-2 lbs per week   She had a EGD/colonoscopy with another provider 6-7 years ago and this showed gastritis but she denies any other abnormalities  She had bloodwork done with her PCP at the end of January which showed a significant leukocytosis of 21 5  She had a pelvic ultrasound done recently which was unremarkable  She is not getting her period anymore which is upsetting to her because she was interested in trying to get pregnant soon  She is anxious and tearful during the visit at times  REVIEW OF SYSTEMS:    CONSTITUTIONAL: Denies any fever, chills, rigors, and weight loss  HEENT: No earache or tinnitus  Denies hearing loss or visual disturbances  CARDIOVASCULAR: No chest pain or palpitations  RESPIRATORY: Denies any cough, hemoptysis, shortness of breath or dyspnea on exertion  GASTROINTESTINAL: As noted in the History of Present Illness  GENITOURINARY: No problems with urination  Denies any hematuria or dysuria  NEUROLOGIC: No dizziness or vertigo, denies headaches  MUSCULOSKELETAL: Denies any muscle or joint pain  SKIN: Denies skin rashes or itching  ENDOCRINE: Denies excessive thirst  Denies intolerance to heat or cold  PSYCHOSOCIAL: Denies depression or anxiety  Denies any recent memory loss         Historical Information   Past Medical History:   Diagnosis Date    Anxiety     Back injury     Cervical radiculopathy     Degenerative disc disease, cervical     pt states entire back    Depression     Head injury     hx of 4 concussions    High cholesterol     IBS (irritable bowel syndrome)     Lumbar radiculopathy     Memory loss     Migraine     Neck injury     Sciatic nerve pain, left      Past Surgical History:   Procedure Laterality Date    SHOULDER SURGERY Right      Social History   Social History     Substance and Sexual Activity   Alcohol Use Yes    Frequency: 2-4 times a month    Drinks per session: 1 or 2    Binge frequency: Never     Social History     Substance and Sexual Activity   Drug Use Never     Social History     Tobacco Use   Smoking Status Current Some Day Smoker    Packs/day: 0 50    Years: 15 00    Pack years: 7 50    Types: Cigarettes   Smokeless Tobacco Never Used     Family History   Problem Relation Age of Onset    ALS Mother     Multiple sclerosis Father     Multiple sclerosis Sister     No Known Problems Brother     No Known Problems Sister     No Known Problems Brother     Breast cancer Maternal Aunt     Breast cancer Paternal Aunt        Meds/Allergies       Current Outpatient Medications:     albuterol (Ventolin HFA) 90 mcg/act inhaler    ALPRAZolam (XANAX) 2 MG tablet    diphenhydrAMINE (BENADRYL) 25 mg capsule    FLUoxetine (PROzac) 20 mg capsule    fluticasone (FLONASE) 50 mcg/act nasal spray    ibuprofen (MOTRIN) 800 mg tablet    montelukast (SINGULAIR) 10 mg tablet    rosuvastatin (CRESTOR) 10 MG tablet    SUMAtriptan (IMITREX) 100 mg tablet    DIGESTIVE ENZYMES PO    hyoscyamine (Anaspaz) 0 125 mg    Lactobacillus (PROBIOTIC ACIDOPHILUS PO)    lidocaine (XYLOCAINE) 5 % ointment    metroNIDAZOLE (METROGEL) 0 75 % gel    omeprazole (PriLOSEC OTC) 20 MG tablet    tiZANidine (ZANAFLEX) 4 mg tablet    Allergies   Allergen Reactions    Topamax [Topiramate] Headache     Severe HA and body aches      Cyclobenzaprine Anxiety    Naproxen Anxiety           Objective     Blood pressure 124/82, pulse 95, weight 74 6 kg (164 lb 6 oz)  Body mass index is 28 21 kg/m²  PHYSICAL EXAM:      General Appearance:   Alert, cooperative, no distress   HEENT:   Normocephalic, atraumatic, anicteric      Neck:  Supple, symmetrical, trachea midline   Lungs:   Clear to auscultation bilaterally; no rales, rhonchi or wheezing; respirations unlabored    Heart[de-identified]   Regular rate and rhythm; no murmur, rub, or gallop     Abdomen:   Soft, non-tender, non-distended; normal bowel sounds; no masses, no organomegaly    Genitalia:   Deferred    Rectal:   Deferred    Extremities:  No cyanosis, clubbing or edema    Pulses:  2+ and symmetric  Skin:  No jaundice, rashes, or lesions    Lymph nodes:  No palpable cervical lymphadenopathy        Lab Results:   No visits with results within 1 Day(s) from this visit  Latest known visit with results is:   Lab on 01/29/2021   Component Date Value    Cholesterol 01/29/2021 181     Triglycerides 01/29/2021 177*    HDL, Direct 01/29/2021 55     LDL Calculated 01/29/2021 91     Sodium 01/29/2021 140     Potassium 01/29/2021 4 3     Chloride 01/29/2021 102     CO2 01/29/2021 29     ANION GAP 01/29/2021 9     BUN 01/29/2021 10     Creatinine 01/29/2021 0 78     Glucose, Fasting 01/29/2021 97     Calcium 01/29/2021 10 1     AST 01/29/2021 21     ALT 01/29/2021 26     Alkaline Phosphatase 01/29/2021 55     Total Protein 01/29/2021 8 4*    Albumin 01/29/2021 3 9     Total Bilirubin 01/29/2021 0 40     eGFR 01/29/2021 91     WBC 01/29/2021 21 50*    RBC 01/29/2021 4 49     Hemoglobin 01/29/2021 14 0     Hematocrit 01/29/2021 43 8     MCV 01/29/2021 98     MCH 01/29/2021 31 2     MCHC 01/29/2021 32 0     RDW 01/29/2021 12 7     Platelets 44/44/5701 361     MPV 01/29/2021 10 8          Radiology Results:   Us Pelvis Complete W Transvaginal    Result Date: 2/2/2021  Narrative: PELVIC ULTRASOUND, COMPLETE INDICATION:  54years old  R10 2: Pelvic and perineal pain  COMPARISON: None TECHNIQUE:   Transabdominal pelvic ultrasound was performed in sagittal and transverse planes with a curvilinear transducer  Additional transvaginal imaging was performed to better evaluate the endometrium and ovaries  Imaging included volumetric sweeps as well as traditional still imaging technique  FINDINGS: UTERUS: The uterus is anteverted in position, measuring 9 4 x 2 8 x 3 9 cm  Contour and echotexture appear normal  Note is made of a nabothian cyst   Otherwise, the cervix shows no suspicious abnormality  ENDOMETRIUM:  Normal caliber of 7 mm  Homogeneous and normal in appearance   OVARIES/ADNEXA: Right ovary: 3 0 x 2 1 x 1 8 cm  No suspicious right ovarian abnormality  There is a 2 3 cm dominant follicle/simple cyst with no internal septations, papillary projections or internal vascularity  Ovarian Doppler flow within normal limits  Left ovary:  1 9 x 1 8 x 2 1 cm  No suspicious left ovarian abnormality  There is a 1 7 cm dominant left ovarian follicle  Doppler flow within normal limits  No suspicious adnexal mass or loculated collections  There is no free fluid  Impression:  Unremarkable pelvic ultrasound examination    Workstation performed: [id number]

## 2021-02-04 NOTE — H&P (VIEW-ONLY)
[facility] - Outpatient Consultation  Shandra Miranda 54 y o  female MRN: [id number]  Encounter: [id number]          ASSESSMENT AND PLAN:      1  Lower abdominal pain  2  Other elevated white blood cell (WBC) count  3  Irritable bowel syndrome with both constipation and diarrhea  - She has been diagnosed with IBS-mixed in the past but present with worsening symptoms including generalized abdominal pain, watery diarrhea 3-4 times daily, and she recently had a leukocytosis of 21 5 on bloodwork  - Will rule out infectious causes including Cdiff, enteric panel, O+P, giardia  - Schedule for EGD/colonoscopy  - Start anaspaz PRN for abdominal cramping  - If her stool studies are unremarkable she may benefit from a rifaximin course for IBS-D      4  Epigastric pain  5  Gastroesophageal reflux disease, unspecified whether esophagitis present  - She has a history of GERD and gastritis on a prior EGD and reports uncontrolled symptoms of postprandial epigastric pain, heartburn, and regurgitation  - Start omeprazole 40 mg daily  - Anti-reflux diet  - Plan for EGD at the time of her colonoscopy to rule out PUD, reflux esophagitis, celiac disease    Follow up pending EGD/colonoscopy findings  ______________________________________________________________________    HPI:  Lea Sutherland is a pleasant 53 yo F with a PMH of depression/anxiety, chronic back pain, IBS-mixed, presenting due to worsening abdominal pain in the epigastric region, L side of the abdomen, and bilateral lower quadrants over the past several months along with 3-4 episodes of urgent diarrhea daily  She has postprandial epigastric pain along with regurgitation, belching, and heartburn  She denies melena or hematochezia  She reports that her abdominal pain and symptoms are exacerbated by eating so she has not been eating as much  She is losing 1-2 lbs per week   She had a EGD/colonoscopy with another provider 6-7 years ago and this showed gastritis but she denies any other abnormalities  She had bloodwork done with her PCP at the end of January which showed a significant leukocytosis of 21 5  She had a pelvic ultrasound done recently which was unremarkable  She is not getting her period anymore which is upsetting to her because she was interested in trying to get pregnant soon  She is anxious and tearful during the visit at times  REVIEW OF SYSTEMS:    CONSTITUTIONAL: Denies any fever, chills, rigors, and weight loss  HEENT: No earache or tinnitus  Denies hearing loss or visual disturbances  CARDIOVASCULAR: No chest pain or palpitations  RESPIRATORY: Denies any cough, hemoptysis, shortness of breath or dyspnea on exertion  GASTROINTESTINAL: As noted in the History of Present Illness  GENITOURINARY: No problems with urination  Denies any hematuria or dysuria  NEUROLOGIC: No dizziness or vertigo, denies headaches  MUSCULOSKELETAL: Denies any muscle or joint pain  SKIN: Denies skin rashes or itching  ENDOCRINE: Denies excessive thirst  Denies intolerance to heat or cold  PSYCHOSOCIAL: Denies depression or anxiety  Denies any recent memory loss         Historical Information   Past Medical History:   Diagnosis Date    Anxiety     Back injury     Cervical radiculopathy     Degenerative disc disease, cervical     pt states entire back    Depression     Head injury     hx of 4 concussions    High cholesterol     IBS (irritable bowel syndrome)     Lumbar radiculopathy     Memory loss     Migraine     Neck injury     Sciatic nerve pain, left      Past Surgical History:   Procedure Laterality Date    SHOULDER SURGERY Right      Social History   Social History     Substance and Sexual Activity   Alcohol Use Yes    Frequency: 2-4 times a month    Drinks per session: 1 or 2    Binge frequency: Never     Social History     Substance and Sexual Activity   Drug Use Never     Social History     Tobacco Use   Smoking Status Current Some Day Smoker    Packs/day: 0 50    Years: 15 00    Pack years: 7 50    Types: Cigarettes   Smokeless Tobacco Never Used     Family History   Problem Relation Age of Onset    ALS Mother     Multiple sclerosis Father     Multiple sclerosis Sister     No Known Problems Brother     No Known Problems Sister     No Known Problems Brother     Breast cancer Maternal Aunt     Breast cancer Paternal Aunt        Meds/Allergies       Current Outpatient Medications:     albuterol (Ventolin HFA) 90 mcg/act inhaler    ALPRAZolam (XANAX) 2 MG tablet    diphenhydrAMINE (BENADRYL) 25 mg capsule    FLUoxetine (PROzac) 20 mg capsule    fluticasone (FLONASE) 50 mcg/act nasal spray    ibuprofen (MOTRIN) 800 mg tablet    montelukast (SINGULAIR) 10 mg tablet    rosuvastatin (CRESTOR) 10 MG tablet    SUMAtriptan (IMITREX) 100 mg tablet    DIGESTIVE ENZYMES PO    hyoscyamine (Anaspaz) 0 125 mg    Lactobacillus (PROBIOTIC ACIDOPHILUS PO)    lidocaine (XYLOCAINE) 5 % ointment    metroNIDAZOLE (METROGEL) 0 75 % gel    omeprazole (PriLOSEC OTC) 20 MG tablet    tiZANidine (ZANAFLEX) 4 mg tablet    Allergies   Allergen Reactions    Topamax [Topiramate] Headache     Severe HA and body aches      Cyclobenzaprine Anxiety    Naproxen Anxiety           Objective     Blood pressure 124/82, pulse 95, weight 74 6 kg (164 lb 6 oz)  Body mass index is 28 21 kg/m²  PHYSICAL EXAM:      General Appearance:   Alert, cooperative, no distress   HEENT:   Normocephalic, atraumatic, anicteric      Neck:  Supple, symmetrical, trachea midline   Lungs:   Clear to auscultation bilaterally; no rales, rhonchi or wheezing; respirations unlabored    Heart[de-identified]   Regular rate and rhythm; no murmur, rub, or gallop     Abdomen:   Soft, non-tender, non-distended; normal bowel sounds; no masses, no organomegaly    Genitalia:   Deferred    Rectal:   Deferred    Extremities:  No cyanosis, clubbing or edema    Pulses:  2+ and symmetric  Skin:  No jaundice, rashes, or lesions    Lymph nodes:  No palpable cervical lymphadenopathy        Lab Results:   No visits with results within 1 Day(s) from this visit  Latest known visit with results is:   Lab on 01/29/2021   Component Date Value    Cholesterol 01/29/2021 181     Triglycerides 01/29/2021 177*    HDL, Direct 01/29/2021 55     LDL Calculated 01/29/2021 91     Sodium 01/29/2021 140     Potassium 01/29/2021 4 3     Chloride 01/29/2021 102     CO2 01/29/2021 29     ANION GAP 01/29/2021 9     BUN 01/29/2021 10     Creatinine 01/29/2021 0 78     Glucose, Fasting 01/29/2021 97     Calcium 01/29/2021 10 1     AST 01/29/2021 21     ALT 01/29/2021 26     Alkaline Phosphatase 01/29/2021 55     Total Protein 01/29/2021 8 4*    Albumin 01/29/2021 3 9     Total Bilirubin 01/29/2021 0 40     eGFR 01/29/2021 91     WBC 01/29/2021 21 50*    RBC 01/29/2021 4 49     Hemoglobin 01/29/2021 14 0     Hematocrit 01/29/2021 43 8     MCV 01/29/2021 98     MCH 01/29/2021 31 2     MCHC 01/29/2021 32 0     RDW 01/29/2021 12 7     Platelets 40/29/1462 361     MPV 01/29/2021 10 8          Radiology Results:   Us Pelvis Complete W Transvaginal    Result Date: 2/2/2021  Narrative: PELVIC ULTRASOUND, COMPLETE INDICATION:  54years old  R10 2: Pelvic and perineal pain  COMPARISON: None TECHNIQUE:   Transabdominal pelvic ultrasound was performed in sagittal and transverse planes with a curvilinear transducer  Additional transvaginal imaging was performed to better evaluate the endometrium and ovaries  Imaging included volumetric sweeps as well as traditional still imaging technique  FINDINGS: UTERUS: The uterus is anteverted in position, measuring 9 4 x 2 8 x 3 9 cm  Contour and echotexture appear normal  Note is made of a nabothian cyst   Otherwise, the cervix shows no suspicious abnormality  ENDOMETRIUM:  Normal caliber of 7 mm  Homogeneous and normal in appearance   OVARIES/ADNEXA: Right ovary: 3 0 x 2 1 x 1 8 cm  No suspicious right ovarian abnormality  There is a 2 3 cm dominant follicle/simple cyst with no internal septations, papillary projections or internal vascularity  Ovarian Doppler flow within normal limits  Left ovary:  1 9 x 1 8 x 2 1 cm  No suspicious left ovarian abnormality  There is a 1 7 cm dominant left ovarian follicle  Doppler flow within normal limits  No suspicious adnexal mass or loculated collections  There is no free fluid  Impression:  Unremarkable pelvic ultrasound examination    Workstation performed: [id number]

## 2021-02-05 DIAGNOSIS — E78.2 MIXED HYPERLIPIDEMIA: Chronic | ICD-10-CM

## 2021-02-05 RX ORDER — ROSUVASTATIN CALCIUM 10 MG/1
TABLET, COATED ORAL
Qty: 90 TABLET | Refills: 1 | Status: SHIPPED | OUTPATIENT
Start: 2021-02-05 | End: 2021-08-21

## 2021-02-12 ENCOUNTER — TELEPHONE (OUTPATIENT)
Dept: INTERNAL MEDICINE CLINIC | Facility: CLINIC | Age: 46
End: 2021-02-12

## 2021-02-12 NOTE — TELEPHONE ENCOUNTER
Called the patient and no one answered, tried calling to verify the patient medical prescription coverage so we can complete her Prior Auth for the medication tiZANidine HCI 4MG tabs, patient may call back and her insurance on file needs to be verified

## 2021-02-17 ENCOUNTER — TELEPHONE (OUTPATIENT)
Dept: INTERNAL MEDICINE CLINIC | Facility: CLINIC | Age: 46
End: 2021-02-17

## 2021-02-17 ENCOUNTER — ANESTHESIA (OUTPATIENT)
Dept: GASTROENTEROLOGY | Facility: HOSPITAL | Age: 46
End: 2021-02-17

## 2021-02-17 ENCOUNTER — ANESTHESIA EVENT (OUTPATIENT)
Dept: GASTROENTEROLOGY | Facility: HOSPITAL | Age: 46
End: 2021-02-17

## 2021-02-17 ENCOUNTER — HOSPITAL ENCOUNTER (OUTPATIENT)
Dept: GASTROENTEROLOGY | Facility: HOSPITAL | Age: 46
Setting detail: OUTPATIENT SURGERY
Discharge: HOME | End: 2021-02-17
Attending: INTERNAL MEDICINE | Admitting: INTERNAL MEDICINE
Payer: COMMERCIAL

## 2021-02-17 VITALS
DIASTOLIC BLOOD PRESSURE: 94 MMHG | BODY MASS INDEX: 29.85 KG/M2 | HEART RATE: 52 BPM | TEMPERATURE: 97.1 F | WEIGHT: 174.82 LBS | SYSTOLIC BLOOD PRESSURE: 171 MMHG | RESPIRATION RATE: 18 BRPM | OXYGEN SATURATION: 97 % | HEIGHT: 64 IN

## 2021-02-17 VITALS — HEART RATE: 94 BPM

## 2021-02-17 DIAGNOSIS — K21.9 GASTROESOPHAGEAL REFLUX DISEASE, UNSPECIFIED WHETHER ESOPHAGITIS PRESENT: ICD-10-CM

## 2021-02-17 DIAGNOSIS — K58.2 IRRITABLE BOWEL SYNDROME WITH BOTH CONSTIPATION AND DIARRHEA: ICD-10-CM

## 2021-02-17 DIAGNOSIS — D72.828 OTHER ELEVATED WHITE BLOOD CELL (WBC) COUNT: ICD-10-CM

## 2021-02-17 DIAGNOSIS — R10.30 LOWER ABDOMINAL PAIN: ICD-10-CM

## 2021-02-17 DIAGNOSIS — R10.13 EPIGASTRIC PAIN: ICD-10-CM

## 2021-02-17 LAB
EXT PREGNANCY TEST URINE: NEGATIVE
EXT. CONTROL: NORMAL

## 2021-02-17 PROCEDURE — 81025 URINE PREGNANCY TEST: CPT | Performed by: ANESTHESIOLOGY

## 2021-02-17 PROCEDURE — 88305 TISSUE EXAM BY PATHOLOGIST: CPT | Performed by: PATHOLOGY

## 2021-02-17 PROCEDURE — 43239 EGD BIOPSY SINGLE/MULTIPLE: CPT | Performed by: INTERNAL MEDICINE

## 2021-02-17 PROCEDURE — 45380 COLONOSCOPY AND BIOPSY: CPT | Performed by: INTERNAL MEDICINE

## 2021-02-17 RX ORDER — SODIUM CHLORIDE, SODIUM LACTATE, POTASSIUM CHLORIDE, CALCIUM CHLORIDE 600; 310; 30; 20 MG/100ML; MG/100ML; MG/100ML; MG/100ML
125 INJECTION, SOLUTION INTRAVENOUS CONTINUOUS
Status: DISCONTINUED | OUTPATIENT
Start: 2021-02-17 | End: 2021-02-21 | Stop reason: HOSPADM

## 2021-02-17 RX ORDER — PROPOFOL 10 MG/ML
INJECTION, EMULSION INTRAVENOUS AS NEEDED
Status: DISCONTINUED | OUTPATIENT
Start: 2021-02-17 | End: 2021-02-17

## 2021-02-17 RX ORDER — METOCLOPRAMIDE HYDROCHLORIDE 5 MG/ML
10 INJECTION INTRAMUSCULAR; INTRAVENOUS ONCE
Status: COMPLETED | OUTPATIENT
Start: 2021-02-17 | End: 2021-02-17

## 2021-02-17 RX ORDER — LIDOCAINE HYDROCHLORIDE 10 MG/ML
INJECTION, SOLUTION EPIDURAL; INFILTRATION; INTRACAUDAL; PERINEURAL AS NEEDED
Status: DISCONTINUED | OUTPATIENT
Start: 2021-02-17 | End: 2021-02-17

## 2021-02-17 RX ADMIN — PROPOFOL 30 MG: 10 INJECTION, EMULSION INTRAVENOUS at 10:27

## 2021-02-17 RX ADMIN — PROPOFOL 20 MG: 10 INJECTION, EMULSION INTRAVENOUS at 10:22

## 2021-02-17 RX ADMIN — PROPOFOL 100 MG: 10 INJECTION, EMULSION INTRAVENOUS at 10:16

## 2021-02-17 RX ADMIN — PROPOFOL 30 MG: 10 INJECTION, EMULSION INTRAVENOUS at 10:20

## 2021-02-17 RX ADMIN — METOCLOPRAMIDE HYDROCHLORIDE 10 MG: 5 INJECTION INTRAMUSCULAR; INTRAVENOUS at 11:05

## 2021-02-17 RX ADMIN — PROPOFOL 50 MG: 10 INJECTION, EMULSION INTRAVENOUS at 10:17

## 2021-02-17 RX ADMIN — SODIUM CHLORIDE, SODIUM LACTATE, POTASSIUM CHLORIDE, AND CALCIUM CHLORIDE 125 ML/HR: .6; .31; .03; .02 INJECTION, SOLUTION INTRAVENOUS at 09:53

## 2021-02-17 RX ADMIN — PROPOFOL 30 MG: 10 INJECTION, EMULSION INTRAVENOUS at 10:24

## 2021-02-17 RX ADMIN — PROPOFOL 10 MG: 10 INJECTION, EMULSION INTRAVENOUS at 10:28

## 2021-02-17 RX ADMIN — LIDOCAINE HYDROCHLORIDE 100 MG: 10 INJECTION, SOLUTION EPIDURAL; INFILTRATION; INTRACAUDAL; PERINEURAL at 10:16

## 2021-02-17 RX ADMIN — PROPOFOL 30 MG: 10 INJECTION, EMULSION INTRAVENOUS at 10:23

## 2021-02-17 NOTE — ANESTHESIA POSTPROCEDURE EVALUATION
Post-Op Assessment Note    CV Status:  Stable  Pain Score: 0    Pain management: adequate     Mental Status:  Awake and sleepy   Hydration Status:  Stable   PONV Controlled:  None   Airway Patency:  Patent and adequate      Post Op Vitals Reviewed: Yes      Staff: CRNA         No complications documented      BP   111/73   Temp   97 4   Pulse  94   Resp   18   SpO2   95

## 2021-02-17 NOTE — DISCHARGE INSTRUCTIONS
Upper Endoscopy   AMBULATORY CARE:   What you need to know about an upper endoscopy: An upper endoscopy is also called an upper gastrointestinal (GI) endoscopy, or an esophagogastroduodenoscopy (EGD)  A scope (thin, flexible tube with a light and camera) is used to examine the walls of your upper digestive tract  The upper digestive tract includes the esophagus, stomach, and duodenum (first part of the small intestine)  An upper endoscopy is used to look for problems, such as bleeding, polyps, ulcers, or infection  How to prepare for an upper endoscopy: Your healthcare provider will talk to you about how to prepare for your procedure  You may need to not eat or drink anything except water for 6 to 12 hours before the procedure  He will tell you what medicines to take or not take on the day of your procedure  Arrange to have someone drive you home  What will happen during an upper endoscopy:   · You will be given medicine through your IV to help you relax and make you drowsy  You will also be given medicine to numb your throat  You may need to wear a plastic mouthpiece to help hold your mouth open and protect your teeth and tongue  Your healthcare provider will gently insert the endoscope through your mouth and down into your throat  You may be asked to swallow once to help move the scope  You may feel pressure in your throat but you should not feel pain  The endoscope does not restrict your breathing  · Your healthcare provider will watch the scope on a monitor  He will take pictures with the scope  He may gently inject air so he can see your digestive tract clearly  Your healthcare provider may take tissue samples and send them to the lab for tests  He may remove foreign objects, tumors, or polyps that may be blocking your digestive tract  Your healthcare provider may also insert tools with the scope to treat bleeding or place a stent (tube)   When the procedure is finished, the endoscope will be slowly removed  What will happen after an upper endoscopy: You may feel bloated, gassy, or have some abdominal discomfort  Your throat may be sore for 24 to 36 hours after the procedure  You may burp or pass gas from air that is still inside your body after your procedure  You may need to take short walks to help move the gas out  Eat small meals, if you feel bloated  Do not drive or make important decisions until the day after your procedure  Risks of an upper endoscopy: Your esophagus, stomach, or duodenum may be punctured or torn during the procedure  This is because of increased pressure as the scope and air are passing through  You may bleed more than expected or get an infection  You may have a slow or irregular heartbeat, or low blood pressure  This can cause sweating and fainting  Fluid may enter your lungs and you may have trouble breathing  These problems can be life-threatening  Call 911 if:   · You have sudden chest pain or trouble breathing  Seek care immediately if:   · You feel dizzy or faint  · You have trouble swallowing  · You have severe throat pain  · Your bowel movements are very dark or black  · Your abdomen is hard and firm and you have severe pain  · You vomit blood  Contact your healthcare provider if:   · You feel full or bloated and cannot burp or pass gas  · You have not had a bowel movement for 3 days after your procedure  · You have neck pain  · You have a fever or chills  · You have nausea or are vomiting  · You have a rash or hives  · You have questions or concerns about your endoscopy  Relieve a sore throat:  Suck on throat lozenges or crushed ice  Gargle with a small amount of warm salt water  Mix 1 teaspoon of salt and 1 cup of warm water to make salt water  Relieve gas and discomfort from bloating:  Lie on your right side with a heating pad on your abdomen  Take short walks to help pass gas   Eat small meals until bloating is relieved  Rest after your procedure: You have been given medicine to relax you  Do not  drive or make important decisions until the day after your procedure  Return to your normal activity as directed  You can usually return to work the day after your procedure  Follow up with your healthcare provider as directed:  Write down your questions so you remember to ask them during your visits  © Copyright [address] Information is for End User's use only and may not be sold, redistributed or otherwise used for commercial purposes  All illustrations and images included in CareNotes® are the copyrighted property of A D A M , Inc  or [facility] Johnathan Galvin   The above information is an  only  It is not intended as medical advice for individual conditions or treatments  Talk to your doctor, nurse or pharmacist before following any medical regimen to see if it is safe and effective for you  Upper Endoscopy   WHAT YOU NEED TO KNOW:   An upper endoscopy is also called an upper gastrointestinal (GI) endoscopy, or an esophagogastroduodenoscopy (EGD)  You may feel bloated, gassy, or have some abdominal discomfort after your procedure  Your throat may be sore for 24 to 36 hours  You may burp or pass gas from air that is still inside your body  DISCHARGE INSTRUCTIONS:   Call 911 for any of the following:   · You have sudden chest pain or trouble breathing  Seek care immediately if:   · You feel dizzy or faint  · You have trouble swallowing  · Your bowel movements are very dark or black  · Your abdomen is hard and firm and you have severe pain  · You vomit blood  Contact your healthcare provider if:   · You feel full or bloated and cannot burp or pass gas  · You have not had a bowel movement for 3 days after your procedure  · You have neck pain  · You have a fever or chills  · You have nausea or are vomiting  · You have a rash or hives      · You have questions or concerns about your endoscopy  Relieve a sore throat:  Suck on throat lozenges or crushed ice  Gargle with a small amount of warm salt water  Mix 1 teaspoon of salt and 1 cup of warm water to make salt water  Relieve gas and discomfort from bloating:  Lie on your right side with a heating pad on your abdomen  Take short walks to help pass gas  Eat small meals until bloating is relieved  Rest after your procedure: You have been given medicine to relax you  Do not  drive or make important decisions until the day after your procedure  Return to your normal activity as directed  You can usually return to work the day after your procedure  Follow up with your healthcare provider as directed:  Write down your questions so you remember to ask them during your visits  © 2017 [address] is for End User's use only and may not be sold, redistributed or otherwise used for commercial purposes  All illustrations and images included in CareNotes® are the copyrighted property of A D A M , Inc  or Luis Miguel Lopez  The above information is an  only  It is not intended as medical advice for individual conditions or treatments  Talk to your doctor, nurse or pharmacist before following any medical regimen to see if it is safe and effective for you  Colonoscopy   WHAT YOU NEED TO KNOW:   A colonoscopy is a procedure to examine the inside of your colon (intestine) with a scope  Polyps or tissue growths may have been removed during your colonoscopy  It is normal to feel bloated and to have some abdominal discomfort  You should be passing gas  If you have hemorrhoids or you had polyps removed, you may have a small amount of bleeding  DISCHARGE INSTRUCTIONS:   Seek care immediately if:   · You have a large amount of bright red blood in your bowel movements  · Your abdomen is hard and firm and you have severe pain  · You have sudden trouble breathing    Contact your healthcare provider if:   · You develop a rash or hives  · You have a fever within 24 hours of your procedure       · You have not had a bowel movement for 3 days after your procedure  · You have questions or concerns about your condition or care  Activity:   · Do not lift, strain, or run  for 3 days after your procedure  · Rest after your procedure  You have been given medicine to relax you  Do not  drive or make important decisions until the day after your procedure  Return to your normal activity as directed  · Relieve gas and discomfort from bloating  by lying on your right side with a heating pad on your abdomen  You may need to take short walks to help the gas move out  Eat small meals until bloating is relieved  If you had polyps removed: For 7 days after your procedure:  · Do not  take aspirin  · Do not  go on long car rides  Follow up with your healthcare provider as directed:  Write down your questions so you remember to ask them during your visits  © 2017 [address] is for End User's use only and may not be sold, redistributed or otherwise used for commercial purposes  All illustrations and images included in CareNotes® are the copyrighted property of [employer] [facility] [employer] , [employer]  or Luis Miguel Lopez  The above information is an  only  It is not intended as medical advice for individual conditions or treatments  Talk to your doctor, nurse or pharmacist before following any medical regimen to see if it is safe and effective for you

## 2021-02-17 NOTE — ANESTHESIA PREPROCEDURE EVALUATION
Procedure:  COLONOSCOPY  EGD    Relevant Problems   CARDIO   (+) Mixed hyperlipidemia      NEURO/PSYCH   (+) Anxiety   (+) Chronic pain syndrome   (+) Depression, major, recurrent (HCC)        Physical Exam    Airway    Mallampati score: II  TM Distance: >3 FB  Neck ROM: full     Dental       Cardiovascular  Cardiovascular exam normal    Pulmonary  Pulmonary exam normal     Other Findings       Cervical radiculopathy   Cervical disc disorder with radiculopathy of mid-cervical region   Lumbar radiculopathy   Depression, major, recurrent (HCC)   Anxiety   Mixed hyperlipidemia   Chronic pain syndrome          1  Lower abdominal pain  2  Other elevated white blood cell (WBC) count  3  Irritable bowel syndrome with both constipation and diarrhea  - She has been diagnosed with IBS-mixed in the past but present with worsening symptoms including generalized abdominal pain, watery diarrhea 3-4 times daily, and she recently had a leukocytosis of 21 5 on bloodwork  - Will rule out infectious causes including Cdiff, enteric panel, O+P, giardia  - Schedule for EGD/colonoscopy  - Start anaspaz PRN for abdominal cramping  - If her stool studies are unremarkable she may benefit from a rifaximin course for IBS-D        4  Epigastric pain  5  Gastroesophageal reflux disease, unspecified whether esophagitis present  - She has a history of GERD and gastritis on a prior EGD and reports uncontrolled symptoms of postprandial epigastric pain, heartburn, and regurgitation  - Start omeprazole 40 mg daily  - Anti-reflux diet  - Plan for EGD at the time of her colonoscopy to rule out PUD, reflux esophagitis, celiac disease  Anesthesia Plan  ASA Score- 2     Anesthesia Type- IV sedation with anesthesia with ASA Monitors  Additional Monitors:   Airway Plan:           Plan Factors-Exercise tolerance (METS): >4 METS  Chart reviewed  Existing labs reviewed  Patient summary reviewed  Induction- intravenous      Postoperative Plan-     Informed Consent- Anesthetic plan and risks discussed with patient  I personally reviewed this patient with the CRNA  Discussed and agreed on the Anesthesia Plan with the CRNA  Rashmi Mcdonough

## 2021-02-17 NOTE — TELEPHONE ENCOUNTER
Called the patient to verify her health benefits information and prescription medication program and she stated that she is being covered by her [employer] and she has already started having Tizanidine filled through [employer]

## 2021-02-19 ENCOUNTER — TELEPHONE (OUTPATIENT)
Dept: GASTROENTEROLOGY | Facility: CLINIC | Age: 46
End: 2021-02-19

## 2021-02-19 NOTE — TELEPHONE ENCOUNTER
----- Message from Jena Baez MD sent at 2/18/2021  9:21 PM EST -----  Pls tell her all the biopsies were negative; pls tell her the polyp was benign as well

## 2021-03-08 DIAGNOSIS — J45.21 MILD INTERMITTENT REACTIVE AIRWAY DISEASE WITH ACUTE EXACERBATION: ICD-10-CM

## 2021-03-08 RX ORDER — MONTELUKAST SODIUM 10 MG/1
TABLET ORAL
Qty: 90 TABLET | Refills: 1 | Status: SHIPPED | OUTPATIENT
Start: 2021-03-08 | End: 2021-09-08

## 2021-03-18 ENCOUNTER — OFFICE VISIT (OUTPATIENT)
Dept: INTERNAL MEDICINE CLINIC | Facility: CLINIC | Age: 46
End: 2021-03-18
Payer: COMMERCIAL

## 2021-03-18 ENCOUNTER — APPOINTMENT (OUTPATIENT)
Dept: LAB | Facility: CLINIC | Age: 46
End: 2021-03-18
Payer: COMMERCIAL

## 2021-03-18 VITALS
HEART RATE: 86 BPM | WEIGHT: 164.4 LBS | BODY MASS INDEX: 30.25 KG/M2 | DIASTOLIC BLOOD PRESSURE: 84 MMHG | TEMPERATURE: 97.6 F | SYSTOLIC BLOOD PRESSURE: 124 MMHG | HEIGHT: 62 IN | OXYGEN SATURATION: 98 %

## 2021-03-18 DIAGNOSIS — F33.9 MAJOR DEPRESSION, RECURRENT, CHRONIC (HCC): Chronic | ICD-10-CM

## 2021-03-18 DIAGNOSIS — G89.4 CHRONIC PAIN SYNDROME: Primary | ICD-10-CM

## 2021-03-18 DIAGNOSIS — M54.16 LUMBAR RADICULOPATHY: Chronic | ICD-10-CM

## 2021-03-18 DIAGNOSIS — M54.12 CERVICAL RADICULOPATHY: Chronic | ICD-10-CM

## 2021-03-18 LAB
BASOPHILS # BLD AUTO: 0.07 THOUSANDS/ΜL (ref 0–0.1)
BASOPHILS NFR BLD AUTO: 1 % (ref 0–1)
EOSINOPHIL # BLD AUTO: 0.11 THOUSAND/ΜL (ref 0–0.61)
EOSINOPHIL NFR BLD AUTO: 1 % (ref 0–6)
ERYTHROCYTE [DISTWIDTH] IN BLOOD BY AUTOMATED COUNT: 12 % (ref 11.6–15.1)
HCT VFR BLD AUTO: 45.1 % (ref 34.8–46.1)
HGB BLD-MCNC: 14.5 G/DL (ref 11.5–15.4)
IMM GRANULOCYTES # BLD AUTO: 0.05 THOUSAND/UL (ref 0–0.2)
IMM GRANULOCYTES NFR BLD AUTO: 0 % (ref 0–2)
LYMPHOCYTES # BLD AUTO: 3.49 THOUSANDS/ΜL (ref 0.6–4.47)
LYMPHOCYTES NFR BLD AUTO: 26 % (ref 14–44)
MCH RBC QN AUTO: 31.6 PG (ref 26.8–34.3)
MCHC RBC AUTO-ENTMCNC: 32.2 G/DL (ref 31.4–37.4)
MCV RBC AUTO: 98 FL (ref 82–98)
MONOCYTES # BLD AUTO: 0.77 THOUSAND/ΜL (ref 0.17–1.22)
MONOCYTES NFR BLD AUTO: 6 % (ref 4–12)
NEUTROPHILS # BLD AUTO: 8.99 THOUSANDS/ΜL (ref 1.85–7.62)
NEUTS SEG NFR BLD AUTO: 66 % (ref 43–75)
NRBC BLD AUTO-RTO: 0 /100 WBCS
PLATELET # BLD AUTO: 353 THOUSANDS/UL (ref 149–390)
PMV BLD AUTO: 11.2 FL (ref 8.9–12.7)
RBC # BLD AUTO: 4.59 MILLION/UL (ref 3.81–5.12)
WBC # BLD AUTO: 13.48 THOUSAND/UL (ref 4.31–10.16)

## 2021-03-18 PROCEDURE — 85025 COMPLETE CBC W/AUTO DIFF WBC: CPT

## 2021-03-18 PROCEDURE — 4004F PT TOBACCO SCREEN RCVD TLK: CPT | Performed by: INTERNAL MEDICINE

## 2021-03-18 PROCEDURE — 99214 OFFICE O/P EST MOD 30 MIN: CPT | Performed by: INTERNAL MEDICINE

## 2021-03-18 PROCEDURE — 36415 COLL VENOUS BLD VENIPUNCTURE: CPT

## 2021-03-18 RX ORDER — NORTRIPTYLINE HYDROCHLORIDE 10 MG/1
10 CAPSULE ORAL
Qty: 30 CAPSULE | Refills: 1 | Status: SHIPPED | OUTPATIENT
Start: 2021-03-18 | End: 2021-05-20
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
## 2021-03-19 ENCOUNTER — TELEPHONE (OUTPATIENT)
Dept: INTERNAL MEDICINE CLINIC | Facility: CLINIC | Age: 46
End: 2021-03-19

## 2021-03-19 DIAGNOSIS — R10.30 LOWER ABDOMINAL PAIN: Primary | ICD-10-CM

## 2021-03-19 DIAGNOSIS — D72.829 LEUKOCYTOSIS, UNSPECIFIED TYPE: ICD-10-CM

## 2021-03-19 NOTE — TELEPHONE ENCOUNTER
----- Message from Francesca Carmichael DO sent at 3/19/2021 10:26 AM EDT -----  White blood cell count has improved, but still slightly high  I'm going to recommend a CT scan of abdomen/pelvis to be on safe side

## 2021-03-24 ENCOUNTER — OFFICE VISIT (OUTPATIENT)
Dept: GASTROENTEROLOGY | Facility: CLINIC | Age: 46
End: 2021-03-24
Payer: COMMERCIAL

## 2021-03-24 VITALS
HEART RATE: 88 BPM | WEIGHT: 161 LBS | DIASTOLIC BLOOD PRESSURE: 72 MMHG | BODY MASS INDEX: 29.63 KG/M2 | SYSTOLIC BLOOD PRESSURE: 124 MMHG | HEIGHT: 62 IN

## 2021-03-24 DIAGNOSIS — K58.0 IRRITABLE BOWEL SYNDROME WITH DIARRHEA: Primary | ICD-10-CM

## 2021-03-24 DIAGNOSIS — R14.0 BLOATING: ICD-10-CM

## 2021-03-24 DIAGNOSIS — K21.9 GASTROESOPHAGEAL REFLUX DISEASE WITHOUT ESOPHAGITIS: ICD-10-CM

## 2021-03-24 PROCEDURE — 99214 OFFICE O/P EST MOD 30 MIN: CPT | Performed by: PHYSICIAN ASSISTANT

## 2021-03-24 PROCEDURE — 3008F BODY MASS INDEX DOCD: CPT | Performed by: INTERNAL MEDICINE

## 2021-03-24 NOTE — PROGRESS NOTES
[facility] - Outpatient Follow-up Note  Baldev Henao 54 y o  female MRN: [id number]  Encounter: [id number]          ASSESSMENT AND PLAN:      1  Epigastric Pain  2  GERD  - EGD in February showed mild gastritis, pathology neg for H  Pylori or celiac disease  - Continue omeprazole 40 mg daily  - Anti-reflux diet  - CT A/P as ordered by her PCP for her persistent leukocytosis    3  IBS-D  4  Bloating  - Colonoscopy in February showed 1 hyperplastic polyp in the rectum s/p removal as well as internal hemorrhoids  - She likely has IBS-D given her chronic symptoms but given her persistent leukocytosis will rule out infectious causes including Cdiff, giardia, O+P and her PCP ordered a CT of the abdomen/pelvis for further workup  - Discussed low FODMAP diet  - Continue anaspaz as needed  - If her stool studies are negative and her CT is unremarkable, will try to give her a rifaximin course for IBS-D    Follow up in 2-3 months       ______________________________________________________________________    SUBJECTIVE:  Shiva Davila is a pleasant 55 yo F presenting for follow up after her EGD/colonoscopy in February  She was initially seen in February for epigastric pain,  Lower abdominal pain, and diarrhea 3-4 times daily  This has been ongoing for several years  She had an endoscopy and colonoscopy which showed mild gastritis, 1 hyperplastic polyp which was removed, and internal hemorrhoids  She has been taking the omeprazole 40 mg daily for about 6 weeks and using the hyoscyamine at bedtime which helps with her lower abdominal cramping  She is still having diarrhea primarily but will alternate with constipation since her colonoscopy  She did not get the stool studies done yet but she plans on doing this  She has a persistent leukocytosis which is being monitored by her PCP and so they ordered a CT scan of her abdomen and pelvis, she still needs to schedule this        REVIEW OF SYSTEMS IS OTHERWISE NEGATIVE        Historical Information   Past Medical History:   Diagnosis Date    Anxiety     Back injury     Cervical radiculopathy     Degenerative disc disease, cervical     pt states entire back    Depression     Head injury     hx of 4 concussions    High cholesterol     IBS (irritable bowel syndrome)     Lumbar radiculopathy     Memory loss     Migraine     Neck injury     Sciatic nerve pain, left      Past Surgical History:   Procedure Laterality Date    CYSTOSCOPY      EGD AND COLONOSCOPY  2015    SHOULDER SURGERY Right      Social History   Social History     Substance and Sexual Activity   Alcohol Use Yes    Alcohol/week: 1 0 standard drinks    Types: 1 Glasses of wine per week    Frequency: 2-4 times a month    Drinks per session: 1 or 2    Binge frequency: Never     Social History     Substance and Sexual Activity   Drug Use Never     Social History     Tobacco Use   Smoking Status Current Some Day Smoker    Packs/day: 0 25    Years: 15 00    Pack years: 3 75    Types: Cigarettes   Smokeless Tobacco Never Used     Family History   Problem Relation Age of Onset    ALS Mother     Multiple sclerosis Father     Multiple sclerosis Sister     No Known Problems Brother     No Known Problems Sister     No Known Problems Brother     Breast cancer Maternal Aunt     Breast cancer Paternal Aunt        Meds/Allergies       Current Outpatient Medications:     albuterol (Ventolin HFA) 90 mcg/act inhaler    ALPRAZolam (XANAX) 2 MG tablet    diphenhydrAMINE (BENADRYL) 25 mg capsule    FLUoxetine (PROzac) 20 mg capsule    fluticasone (FLONASE) 50 mcg/act nasal spray    hyoscyamine (Anaspaz) 0 125 mg    ibuprofen (MOTRIN) 800 mg tablet    Lactobacillus (PROBIOTIC ACIDOPHILUS PO)    montelukast (SINGULAIR) 10 mg tablet    nortriptyline (PAMELOR) 10 mg capsule    omeprazole (PriLOSEC OTC) 20 MG tablet    rosuvastatin (CRESTOR) 10 MG tablet    SUMAtriptan (IMITREX) 100 mg tablet    tiZANidine (ZANAFLEX) 4 mg tablet    Allergies   Allergen Reactions    Topamax [Topiramate] Headache     Severe HA and body aches      Cyclobenzaprine Anxiety    Naproxen Anxiety           Objective     Blood pressure 124/72, pulse 88, height 5' 2" (1 575 m), weight 73 kg (161 lb)  Body mass index is 29 45 kg/m²  PHYSICAL EXAM:      General Appearance:   Alert, cooperative, no distress   HEENT:   Normocephalic, atraumatic, anicteric      Neck:  Supple, symmetrical, trachea midline   Lungs:   Clear to auscultation bilaterally; no rales, rhonchi or wheezing; respirations unlabored    Heart[de-identified]   Regular rate and rhythm; no murmur, rub, or gallop  Abdomen:   Soft, non-tender, non-distended; normal bowel sounds; no masses, no organomegaly    Genitalia:   Deferred    Rectal:   Deferred    Extremities:  No cyanosis, clubbing or edema    Pulses:  2+ and symmetric    Skin:  No jaundice, rashes, or lesions    Lymph nodes:  No palpable cervical lymphadenopathy        Lab Results:   No visits with results within 1 Day(s) from this visit     Latest known visit with results is:   Appointment on 03/18/2021   Component Date Value    WBC 03/18/2021 13 48*    RBC 03/18/2021 4 59     Hemoglobin 03/18/2021 14 5     Hematocrit 03/18/2021 45 1     MCV 03/18/2021 98     MCH 03/18/2021 31 6     MCHC 03/18/2021 32 2     RDW 03/18/2021 12 0     MPV 03/18/2021 11 2     Platelets 36/03/5705 353     nRBC 03/18/2021 0     Neutrophils Relative 03/18/2021 66     Immat GRANS % 03/18/2021 0     Lymphocytes Relative 03/18/2021 26     Monocytes Relative 03/18/2021 6     Eosinophils Relative 03/18/2021 1     Basophils Relative 03/18/2021 1     Neutrophils Absolute 03/18/2021 8 99*    Immature Grans Absolute 03/18/2021 0 05     Lymphocytes Absolute 03/18/2021 3 49     Monocytes Absolute 03/18/2021 0 77     Eosinophils Absolute 03/18/2021 0 11     Basophils Absolute 03/18/2021 0 07          Radiology Results: No results found

## 2021-05-20 DIAGNOSIS — G89.4 CHRONIC PAIN SYNDROME: ICD-10-CM

## 2021-05-20 DIAGNOSIS — R10.13 EPIGASTRIC PAIN: ICD-10-CM

## 2021-05-20 DIAGNOSIS — K21.9 GASTROESOPHAGEAL REFLUX DISEASE, UNSPECIFIED WHETHER ESOPHAGITIS PRESENT: ICD-10-CM

## 2021-05-20 RX ORDER — NORTRIPTYLINE HYDROCHLORIDE 10 MG/1
CAPSULE ORAL
Qty: 30 CAPSULE | Refills: 1 | Status: SHIPPED | OUTPATIENT
Start: 2021-05-20 | End: 2022-04-12 | Stop reason: ALTCHOICE

## 2021-05-20 RX ORDER — OMEPRAZOLE 20 MG/1
CAPSULE, DELAYED RELEASE ORAL
Qty: 180 CAPSULE | Refills: 1 | Status: SHIPPED | OUTPATIENT
Start: 2021-05-20 | End: 2021-11-16 | Stop reason: SDUPTHER

## 2021-05-20 RX ORDER — OMEPRAZOLE 20 MG/1
CAPSULE, DELAYED RELEASE ORAL
Qty: 60 CAPSULE | Refills: 2 | Status: SHIPPED | OUTPATIENT
Start: 2021-05-20 | End: 2021-05-20

## 2021-06-10 ENCOUNTER — APPOINTMENT (OUTPATIENT)
Dept: LAB | Facility: CLINIC | Age: 46
End: 2021-06-10
Payer: COMMERCIAL

## 2021-06-10 ENCOUNTER — OFFICE VISIT (OUTPATIENT)
Dept: INTERNAL MEDICINE CLINIC | Facility: CLINIC | Age: 46
End: 2021-06-10
Payer: COMMERCIAL

## 2021-06-10 VITALS
WEIGHT: 161 LBS | SYSTOLIC BLOOD PRESSURE: 122 MMHG | DIASTOLIC BLOOD PRESSURE: 74 MMHG | HEART RATE: 82 BPM | TEMPERATURE: 98.4 F | BODY MASS INDEX: 29.63 KG/M2 | RESPIRATION RATE: 14 BRPM | HEIGHT: 62 IN

## 2021-06-10 DIAGNOSIS — R53.1 WEAKNESS: ICD-10-CM

## 2021-06-10 DIAGNOSIS — R55 SYNCOPE, UNSPECIFIED SYNCOPE TYPE: ICD-10-CM

## 2021-06-10 DIAGNOSIS — R42 DIZZINESS AND GIDDINESS: Primary | ICD-10-CM

## 2021-06-10 DIAGNOSIS — R26.9 GAIT DISTURBANCE: ICD-10-CM

## 2021-06-10 DIAGNOSIS — R20.2 PARESTHESIAS: ICD-10-CM

## 2021-06-10 DIAGNOSIS — D72.829 LEUKOCYTOSIS, UNSPECIFIED TYPE: ICD-10-CM

## 2021-06-10 DIAGNOSIS — R29.6 RECURRENT FALLS: ICD-10-CM

## 2021-06-10 LAB
ALBUMIN SERPL BCP-MCNC: 3.8 G/DL (ref 3.5–5)
ALP SERPL-CCNC: 55 U/L (ref 46–116)
ALT SERPL W P-5'-P-CCNC: 20 U/L (ref 12–78)
ANION GAP SERPL CALCULATED.3IONS-SCNC: 5 MMOL/L (ref 4–13)
AST SERPL W P-5'-P-CCNC: 15 U/L (ref 5–45)
BILIRUB SERPL-MCNC: 0.41 MG/DL (ref 0.2–1)
BUN SERPL-MCNC: 6 MG/DL (ref 5–25)
CALCIUM SERPL-MCNC: 9.8 MG/DL (ref 8.3–10.1)
CHLORIDE SERPL-SCNC: 103 MMOL/L (ref 100–108)
CO2 SERPL-SCNC: 30 MMOL/L (ref 21–32)
CREAT SERPL-MCNC: 0.68 MG/DL (ref 0.6–1.3)
ERYTHROCYTE [DISTWIDTH] IN BLOOD BY AUTOMATED COUNT: 12.4 % (ref 11.6–15.1)
GFR SERPL CREATININE-BSD FRML MDRD: 105 ML/MIN/1.73SQ M
GLUCOSE SERPL-MCNC: 91 MG/DL (ref 65–140)
HCT VFR BLD AUTO: 40.5 % (ref 34.8–46.1)
HGB BLD-MCNC: 13.2 G/DL (ref 11.5–15.4)
MCH RBC QN AUTO: 32 PG (ref 26.8–34.3)
MCHC RBC AUTO-ENTMCNC: 32.6 G/DL (ref 31.4–37.4)
MCV RBC AUTO: 98 FL (ref 82–98)
PLATELET # BLD AUTO: 348 THOUSANDS/UL (ref 149–390)
PMV BLD AUTO: 11.3 FL (ref 8.9–12.7)
POTASSIUM SERPL-SCNC: 4.1 MMOL/L (ref 3.5–5.3)
PROT SERPL-MCNC: 8 G/DL (ref 6.4–8.2)
RBC # BLD AUTO: 4.12 MILLION/UL (ref 3.81–5.12)
SODIUM SERPL-SCNC: 138 MMOL/L (ref 136–145)
WBC # BLD AUTO: 13.7 THOUSAND/UL (ref 4.31–10.16)

## 2021-06-10 PROCEDURE — 85027 COMPLETE CBC AUTOMATED: CPT

## 2021-06-10 PROCEDURE — 86618 LYME DISEASE ANTIBODY: CPT

## 2021-06-10 PROCEDURE — 3008F BODY MASS INDEX DOCD: CPT | Performed by: INTERNAL MEDICINE

## 2021-06-10 PROCEDURE — 36415 COLL VENOUS BLD VENIPUNCTURE: CPT

## 2021-06-10 PROCEDURE — 99214 OFFICE O/P EST MOD 30 MIN: CPT | Performed by: INTERNAL MEDICINE

## 2021-06-10 PROCEDURE — 80053 COMPREHEN METABOLIC PANEL: CPT

## 2021-06-10 PROCEDURE — 4004F PT TOBACCO SCREEN RCVD TLK: CPT | Performed by: INTERNAL MEDICINE

## 2021-06-10 RX ORDER — MECLIZINE HCL 12.5 MG/1
12.5 TABLET ORAL 3 TIMES DAILY PRN
Qty: 60 TABLET | Refills: 0 | Status: SHIPPED | OUTPATIENT
Start: 2021-06-10 | End: 2022-04-12 | Stop reason: ALTCHOICE

## 2021-06-10 NOTE — PATIENT INSTRUCTIONS
Syncope   WHAT YOU NEED TO KNOW:   Syncope is also called fainting or passing out  Syncope is a sudden, temporary loss of consciousness, followed by a fall from a standing or sitting position  Syncope ranges from not serious to a sign of a more serious condition that needs to be treated  You can control some health conditions that cause syncope  Your healthcare providers can help you create a plan to manage syncope and prevent episodes  DISCHARGE INSTRUCTIONS:   Seek care immediately if:   · You are bleeding because you hit your head when you fainted  · You suddenly have double vision, difficulty speaking, numbness, and cannot move your arms or legs  · You have chest pain and trouble breathing  · You vomit blood or material that looks like coffee grounds  · You see blood in your bowel movement  Contact your healthcare provider if:   · You have new or worsening symptoms  · You have another syncope episode  · You have a headache, fast heartbeat, or feel too dizzy to stand up  · You have questions or concerns about your condition or care  Medicines:   · Medicines  may be needed to help your heart pump strongly and regularly  Your healthcare provider may also make changes to any medicines that are causing syncope  · Take your medicine as directed  Contact your healthcare provider if you think your medicine is not helping or if you have side effects  Tell him or her if you are allergic to any medicine  Keep a list of the medicines, vitamins, and herbs you take  Include the amounts, and when and why you take them  Bring the list or the pill bottles to follow-up visits  Carry your medicine list with you in case of an emergency  Follow up with your healthcare provider as directed:  Write down your questions so you remember to ask them during your visits  Manage syncope:   · Keep a record of your syncope episodes  Include your symptoms and your activity before and after the episode  The record can help your healthcare provider find the cause of your syncope and help you manage episodes  · Sit or lie down when needed  This includes when you feel dizzy, your throat is getting tight, and your vision changes  Raise your legs above the level of your heart  · Take slow, deep breaths if you start to breathe faster with anxiety or fear  This can help decrease dizziness and the feeling that you might faint  · Check your blood pressure often  This is important if you take medicine to lower your blood pressure  Check your blood pressure when you are lying down and when you are standing  Ask how often to check during the day  Keep a record of your blood pressure numbers  Your healthcare provider may use the record to help plan your treatment  Prevent a syncope episode:   · Move slowly and let yourself get used to one position before you move to another position  This is very important when you change from a lying or sitting position to a standing position  Take some deep breaths before you stand up from a lying position  Stand up slowly  Sudden movements may cause a fainting spell  Sit on the side of the bed or couch for a few minutes before you stand up  If you are on bedrest, try to be upright for about 2 hours each day, or as directed  Do not lock your legs if you are standing for a long period of time  Move your legs and bend your knees to keep blood flowing  · Follow your healthcare provider's recommendations  Your provider may  recommend that you drink more liquids to prevent dehydration  You may also need to have more salt to keep your blood pressure from dropping too low and causing syncope  Your provider will tell you how much liquid and sodium to have each day  He or she will also tell you how much physical activity is safe for you  This will depend on what is causing your syncope  · Watch for signs of low blood sugar    These include hunger, nervousness, sweating, and fast or fluttery heartbeats  Talk with your healthcare provider about ways to keep your blood sugar level steady  · Do not strain if you are constipated  You may faint if you strain to have a bowel movement  Walking is the best way to get your bowels moving  Eat foods high in fiber to make it easier to have a bowel movement  Good examples are high-fiber cereals, beans, vegetables, and whole-grain breads  Prune juice may help make bowel movements softer  · Be careful in hot weather  Heat can cause a syncope episode  Limit activity done outside on hot days  Physical activity in hot weather can lead to dehydration  This can cause an episode  © Copyright [address] Information is for End User's use only and may not be sold, redistributed or otherwise used for commercial purposes  All illustrations and images included in CareNotes® are the copyrighted property of [employer]  or [facility] Johnathan Galvin   The above information is an  only  It is not intended as medical advice for individual conditions or treatments  Talk to your doctor, nurse or pharmacist before following any medical regimen to see if it is safe and effective for you
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
## 2021-06-11 ENCOUNTER — TELEPHONE (OUTPATIENT)
Dept: INTERNAL MEDICINE CLINIC | Facility: CLINIC | Age: 46
End: 2021-06-11

## 2021-06-11 LAB — B BURGDOR IGG+IGM SER-ACNC: 28

## 2021-06-11 NOTE — TELEPHONE ENCOUNTER
----- Message from Fernanda Orlando DO sent at 6/11/2021  7:40 AM EDT -----  Only abnormality noted on labs is elevated white blood cell count  Looking back, this has been persistently elevated for some time now  My recommendation would be to see a specialist because of this  I am going to put in a referral to hematology

## 2021-06-29 ENCOUNTER — TELEPHONE (OUTPATIENT)
Dept: INTERNAL MEDICINE CLINIC | Facility: CLINIC | Age: 46
End: 2021-06-29

## 2021-06-29 DIAGNOSIS — S42.031D TRAUMATIC CLOSED FRACTURE OF DISTAL CLAVICLE WITH MINIMAL DISPLACEMENT, RIGHT, WITH ROUTINE HEALING, SUBSEQUENT ENCOUNTER: ICD-10-CM

## 2021-06-29 DIAGNOSIS — S22.009D CLOSED FRACTURE OF TRANSVERSE PROCESS OF THORACIC VERTEBRA WITH ROUTINE HEALING, SUBSEQUENT ENCOUNTER: ICD-10-CM

## 2021-06-29 DIAGNOSIS — S42.031D CLOSED DISPLACED FRACTURE OF ACROMIAL END OF RIGHT CLAVICLE WITH ROUTINE HEALING, SUBSEQUENT ENCOUNTER: ICD-10-CM
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
## 2021-07-08 DIAGNOSIS — M25.511 ACUTE PAIN OF RIGHT SHOULDER: Primary | ICD-10-CM

## 2021-07-09 ENCOUNTER — OFFICE VISIT (OUTPATIENT)
Dept: OBGYN CLINIC | Facility: CLINIC | Age: 46
End: 2021-07-09
Payer: COMMERCIAL

## 2021-07-09 ENCOUNTER — APPOINTMENT (OUTPATIENT)
Dept: RADIOLOGY | Facility: CLINIC | Age: 46
End: 2021-07-09
Payer: COMMERCIAL

## 2021-07-09 VITALS
SYSTOLIC BLOOD PRESSURE: 125 MMHG | WEIGHT: 161 LBS | DIASTOLIC BLOOD PRESSURE: 87 MMHG | HEART RATE: 101 BPM | HEIGHT: 62 IN | BODY MASS INDEX: 29.63 KG/M2

## 2021-07-09 DIAGNOSIS — M62.838 CERVICAL PARASPINAL MUSCLE SPASM: ICD-10-CM

## 2021-07-09 DIAGNOSIS — M25.511 ACUTE PAIN OF RIGHT SHOULDER: ICD-10-CM

## 2021-07-09 DIAGNOSIS — S42.031A DISPLACED FRACTURE OF LATERAL END OF RIGHT CLAVICLE, INITIAL ENCOUNTER FOR CLOSED FRACTURE: Primary | ICD-10-CM

## 2021-07-09 PROCEDURE — 3008F BODY MASS INDEX DOCD: CPT | Performed by: INTERNAL MEDICINE

## 2021-07-09 PROCEDURE — 73000 X-RAY EXAM OF COLLAR BONE: CPT

## 2021-07-09 PROCEDURE — 99203 OFFICE O/P NEW LOW 30 MIN: CPT | Performed by: ORTHOPAEDIC SURGERY
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
## 2021-07-10 PROBLEM — S42.031A DISPLACED FRACTURE OF LATERAL END OF RIGHT CLAVICLE, INITIAL ENCOUNTER FOR CLOSED FRACTURE: Status: ACTIVE | Noted: 2021-07-10

## 2021-07-10 PROCEDURE — 23500 CLTX CLAVICULAR FX W/O MNPJ: CPT | Performed by: ORTHOPAEDIC SURGERY

## 2021-07-12 PROBLEM — S22.009A CLOSED FRACTURE OF TRANSVERSE PROCESS OF THORACIC VERTEBRA (HCC): Status: ACTIVE | Noted: 2021-06-26

## 2021-07-26 ENCOUNTER — OFFICE VISIT (OUTPATIENT)
Dept: PAIN MEDICINE | Facility: CLINIC | Age: 46
End: 2021-07-26
Payer: COMMERCIAL

## 2021-07-26 VITALS
DIASTOLIC BLOOD PRESSURE: 63 MMHG | HEART RATE: 71 BPM | BODY MASS INDEX: 30 KG/M2 | HEIGHT: 62 IN | WEIGHT: 163 LBS | SYSTOLIC BLOOD PRESSURE: 114 MMHG

## 2021-07-26 DIAGNOSIS — M79.18 MYOFASCIAL PAIN SYNDROME: ICD-10-CM

## 2021-07-26 DIAGNOSIS — M62.838 CERVICAL PARASPINAL MUSCLE SPASM: Primary | ICD-10-CM

## 2021-07-26 DIAGNOSIS — M25.511 ACUTE PAIN OF RIGHT SHOULDER: ICD-10-CM

## 2021-07-26 PROCEDURE — 99214 OFFICE O/P EST MOD 30 MIN: CPT | Performed by: STUDENT IN AN ORGANIZED HEALTH CARE EDUCATION/TRAINING PROGRAM

## 2021-07-26 PROCEDURE — 4004F PT TOBACCO SCREEN RCVD TLK: CPT | Performed by: STUDENT IN AN ORGANIZED HEALTH CARE EDUCATION/TRAINING PROGRAM

## 2021-07-26 PROCEDURE — 3008F BODY MASS INDEX DOCD: CPT | Performed by: STUDENT IN AN ORGANIZED HEALTH CARE EDUCATION/TRAINING PROGRAM

## 2021-07-26 RX ORDER — METHOCARBAMOL 500 MG/1
500 TABLET, FILM COATED ORAL 2 TIMES DAILY PRN
Qty: 20 TABLET | Refills: 0 | Status: SHIPPED | OUTPATIENT
Start: 2021-07-26 | End: 2022-04-12 | Stop reason: ALTCHOICE
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
## 2021-07-26 NOTE — PATIENT INSTRUCTIONS
Trigger Point Injection   AMBULATORY CARE:   A trigger point injection  is used to relax a muscle knot  This helps relieve pain  You may be able to have more than one trigger point treated during a session  How to prepare for a trigger point injection:   · Your healthcare provider will tell you how to prepare  Arrange to have someone drive you home after the injection  · Tell your provider about all medicines you take, including pain medicine, blood thinners, and muscle relaxers  He or she will tell you if you need to stop any medicine for the injection, and when to stop  He or she will tell you which medicines to take or not take on the day of the injection  · Tell your provider about all your allergies, including to any pain medicine  What will happen during a trigger point injection:   · You may be sitting or lying, depending on where the trigger point is located  Your healthcare provider will feel for a knot in the muscle  He or she may toyin your skin over the knot  · Your provider will put a needle through your skin and into the trigger point  Saline (salt solution), pain relievers, or other medicines may be pushed through the needle into the trigger point  Your provider may use only a dry needle (no medicine)  He or she will pull the needle almost all the way out and then push it in again  He or she will repeat this several times until the muscle stops twitching or feels relaxed  · Your provider will remove the needle and stretch the muscle area  He or she may apply pressure to the area for 2 minutes  A bandage will be put over the injection site to prevent bleeding or an infection  What to expect after a trigger point injection:   · You may feel pain relief right away  It is normal for some pain to start again 2 hours later  An ice pack or over-the-counter pain medicine can help lower the pain  · You may feel sore in the injection site for a few days   If you need another injection in the same area, wait until the area is not sore  · Your healthcare provider may give you specific activity instructions to follow at home or recommend physical therapy  In general, you should try to stay active  Avoid strenuous activity for the first 3 or 4 days after the injection  · Do not have more injections if you still have trigger point pain after 2 or 3 injections  Risks of a trigger point injection:  You may have a severe allergic reaction to pain medicine injected  The injection may be painful, or you may be sore where you got the injection  You may bleed, bruise, or develop an infection in the injection area  The injection may cause you to feel faint  Rarely, the needle may cause muscle or blood vessel damage or your lung may collapse if you get the injection near your chest   Call your local emergency number (911 in the [address] [place] [address],[address]), or have someone call if:   · Your mouth and throat are swollen  · You are wheezing or have trouble breathing  · You have chest pain or your heart is beating faster than usual     · You feel like you are going to faint  When should I seek immediate care? · Your face is red or swollen  · You have hives that spread over your body  · You feel weak or dizzy  Call your doctor or pain specialist if:   · You have a fever within 1 week of the injection  · You have redness or swelling within 1 week of the injection  · You have new or worsening pain near the injection site  · You have questions or concerns about your condition or care  Self-care:   · Stay active after you have trigger point injections  Gently move your joints through their full range of motion during the first week  Avoid strenuous activity for 3 or 4 days  · Do regular stretches of the trigger point muscle  Place gentle pressure on the trigger point, and then stretch the muscle  Ask your healthcare provider for more information about how to stretch and apply pressure      · Apply ice to the injection site  Use an ice pack, or put ice in a plastic bag  Cover the bag with a towel before you apply it  Apply ice for 15 to 20 minutes every hour, or as directed  · Apply heat to trigger point sites  Heat can help relax muscles and relieve trigger point pain  Use a heat pack or a heating pad set on low  Apply heat for 15 minutes every hour, or as directed  Follow up with your doctor or pain specialist as directed:  Write down your questions so you remember to ask them during your visits  © Copyright [employer] 2021 Information is for End User's use only and may not be sold, redistributed or otherwise used for commercial purposes  All illustrations and images included in CareNotes® are the copyrighted property of A [employer]  or [facility] [employer] Berry  The above information is an  only  It is not intended as medical advice for individual conditions or treatments  Talk to your doctor, nurse or pharmacist before following any medical regimen to see if it is safe and effective for you

## 2021-08-01 DIAGNOSIS — S42.031A DISPLACED FRACTURE OF LATERAL END OF RIGHT CLAVICLE, INITIAL ENCOUNTER FOR CLOSED FRACTURE: Primary | ICD-10-CM

## 2021-08-10 ENCOUNTER — APPOINTMENT (OUTPATIENT)
Dept: RADIOLOGY | Facility: CLINIC | Age: 46
End: 2021-08-10
Payer: COMMERCIAL

## 2021-08-10 ENCOUNTER — OFFICE VISIT (OUTPATIENT)
Dept: OBGYN CLINIC | Facility: CLINIC | Age: 46
End: 2021-08-10

## 2021-08-10 VITALS
HEART RATE: 85 BPM | BODY MASS INDEX: 30.14 KG/M2 | SYSTOLIC BLOOD PRESSURE: 103 MMHG | WEIGHT: 164.8 LBS | DIASTOLIC BLOOD PRESSURE: 71 MMHG

## 2021-08-10 DIAGNOSIS — S42.031A DISPLACED FRACTURE OF LATERAL END OF RIGHT CLAVICLE, INITIAL ENCOUNTER FOR CLOSED FRACTURE: ICD-10-CM

## 2021-08-10 DIAGNOSIS — S42.031D CLOSED DISPLACED FRACTURE OF ACROMIAL END OF RIGHT CLAVICLE WITH ROUTINE HEALING, SUBSEQUENT ENCOUNTER: Primary | ICD-10-CM

## 2021-08-10 PROCEDURE — 73000 X-RAY EXAM OF COLLAR BONE: CPT

## 2021-08-10 PROCEDURE — 99024 POSTOP FOLLOW-UP VISIT: CPT | Performed by: ORTHOPAEDIC SURGERY

## 2021-08-10 NOTE — PROGRESS NOTES
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  I did note that I do recommend physical therapy as I indicated at her last visit approximately 4 weeks ago  I did note that the recommendation of physical therapy previously was for nonweightbearing range of motion  She elected at that time not to perform PT  Currently she may partake in physical therapy with gentle weight-bearing and range of motion  Additionally, I did also state that physical therapy does have benefits in helping stimulate healing to the fracture sites when performed in an controlled environment such as physical therapy  I did also note that due to their continued concerns with recommendations of physical therapy they could certainly consider a 2nd opinion  I did note that the goal of her treatment plan is to achieve an asymptomatic nonunion as she would be able to regain range of motion and function of her shoulder  However a fracture will continue to be demonstrated on repeat x-rays of the clavicle  Mei Martinez verbalized understanding and had no further questions  I would be happy to see her back in 4 weeks for repeat clinical evaluation and repeat x-ray of the right clavicle          To Do Next Visit:    Re-evaluate and new x-rays of right clavicle    _____________________________________________________  CHIEF COMPLAINT:  Chief Complaint   Patient presents with    Right Shoulder - Follow-up         SUBJECTIVE:  Brendia Oppenheim is a 54 y o  female who presents for follow-up evaluation of the distal clavicle fracture on the right side  She is now 6 5 weeks status post closed treatment  She states that she has been experiencing pain into the shoulder as well as intermittent clicking that can be painful with movement she states that due to these episodes of clicking and pain she has not participated in physical therapy as recommended at her last visit  She did note that she had a concern for potential displacement of the fracture  She has been seeing pain management and did have CT scans of the cervical spine  She was evaluated and recommended for possible trigger point injections well  She has not undergone any trigger point injections currently  She notes that her pain is better while at rest   Today she denies any distal paresthesias  SOCIAL HISTORY:  Social History     Tobacco Use    Smoking status: Current Some Day Smoker     Packs/day: 0 25     Years: 15 00     Pack years: 3 75     Types: Cigarettes    Smokeless tobacco: Never Used   Vaping Use    Vaping Use: Never used   Substance Use Topics    Alcohol use:  Yes     Alcohol/week: 1 0 standard drinks     Types: 1 Glasses of wine per week    Drug use: Yes     Types: Marijuana       MEDICATIONS:    Current Outpatient Medications:     albuterol (Ventolin HFA) 90 mcg/act inhaler, 1-2 puffs inhaled every 4 to 6 hours as needed for wheezing or shortness of breath, Disp: 18 g, Rfl: 5    ALPRAZolam (XANAX) 2 MG tablet, Take by mouth 3 (three) times a day as needed for anxiety , Disp: , Rfl:     diphenhydrAMINE (BENADRYL) 25 mg capsule, Take 25 mg by mouth every 6 (six) hours as needed for itching, Disp: , Rfl:     FLUoxetine (PROzac) 20 mg capsule, Take 30 mg by mouth daily , Disp: , Rfl:     fluticasone (FLONASE) 50 mcg/act nasal spray, INSTILL 1 SPRAY IN EACH NOSTRIL DAILY, Disp: 48 g, Rfl: 1   hyoscyamine (Anaspaz) 0 125 mg, Take 1 tablet (0 125 mg total) by mouth every 4 (four) hours as needed (abdominal pain/cramping), Disp: 60 tablet, Rfl: 2    ibuprofen (MOTRIN) 800 mg tablet, Take 1 tab by mouth every 8 hours as needed for pain, Disp: 90 tablet, Rfl: 0    Lactobacillus (PROBIOTIC ACIDOPHILUS PO), Take by mouth as needed , Disp: , Rfl:     meclizine (ANTIVERT) 12 5 MG tablet, Take 1 tablet (12 5 mg total) by mouth 3 (three) times a day as needed for dizziness, Disp: 60 tablet, Rfl: 0    methocarbamol (ROBAXIN) 500 mg tablet, Take 1 tablet (500 mg total) by mouth 2 (two) times a day as needed for muscle spasms, Disp: 20 tablet, Rfl: 0    montelukast (SINGULAIR) 10 mg tablet, TAKE 1 TABLET BY MOUTH EVERY NIGHT AT BEDTIME, Disp: 90 tablet, Rfl: 1    nortriptyline (PAMELOR) 10 mg capsule, TAKE 1 CAPSULE(10 MG) BY MOUTH DAILY AT BEDTIME, Disp: 30 capsule, Rfl: 1    omeprazole (PriLOSEC) 20 mg delayed release capsule, TAKE 2 CAPSULES BY MOUTH EVERY DAY, Disp: 180 capsule, Rfl: 1    rosuvastatin (CRESTOR) 10 MG tablet, TAKE 1 TABLET BY MOUTH DAILY, Disp: 90 tablet, Rfl: 1    SUMAtriptan (IMITREX) 100 mg tablet, One p  O  At headache onset, may repeat after 2 hours p r n  Maximum 2/24 hours, Disp: 9 tablet, Rfl: 5    REVIEW OF SYSTEMS:  MSK: right shoulder pain  Neuro: sensation intact  Pertinent items are otherwise noted in HPI  A comprehensive review of systems was otherwise negative     _____________________________________________________  PHYSICAL EXAMINATION:  Vital signs: /71   Pulse 85   Wt 74 8 kg (164 lb 12 8 oz)   BMI 30 14 kg/m²   General: No acute distress, awake and alert  Psychiatric: Mood and affect appear appropriate  HEENT: Trachea Midline, No torticollis, no apparent facial trauma  Cardiovascular: No audible murmurs;  Extremities appear perfused  Pulmonary: No audible wheezing or stridor  Skin: No open lesions; see further details (if any) below    MUSCULOSKELETAL EXAMINATION:  Extremities:  Right shoulder  -tenderness to palpation at distal clavicle  - active abduction to 90 degrees  - sensation intact along the median, ulnar      _____________________________________________________  STUDIES REVIEWED:  I personally reviewed the images and interpretation is as follows:  X-rays of the right clavicle performed today 7/9/2021 demonstrates a closed displaced distal clavicle fracture with bony callus bridging indicating routine healing        Scribe Attestation    I,:  Niko Hopes am acting as a scribe while in the presence of the attending physician :       I,:  Topher Jones MD personally performed the services described in this documentation    as scribed in my presence :

## 2021-08-21 DIAGNOSIS — E78.2 MIXED HYPERLIPIDEMIA: Chronic | ICD-10-CM

## 2021-08-21 RX ORDER — ROSUVASTATIN CALCIUM 10 MG/1
TABLET, COATED ORAL
Qty: 90 TABLET | Refills: 1 | Status: SHIPPED | OUTPATIENT
Start: 2021-08-21 | End: 2022-04-12

## 2021-09-08 DIAGNOSIS — J45.21 MILD INTERMITTENT REACTIVE AIRWAY DISEASE WITH ACUTE EXACERBATION: ICD-10-CM

## 2021-09-08 RX ORDER — MONTELUKAST SODIUM 10 MG/1
TABLET ORAL
Qty: 90 TABLET | Refills: 1 | Status: SHIPPED | OUTPATIENT
Start: 2021-09-08

## 2021-10-05 NOTE — INTERVAL H&P NOTE
H&P reviewed  After examining the patient I find no changes in the patients condition since the H&P had been written      Vitals:    02/17/21 0953   BP: 133/78   Pulse: 92   Resp: 18   Temp: 97 5 °F (36 4 °C)   SpO2: 98% normal

## 2021-10-18 DIAGNOSIS — J45.21 MILD INTERMITTENT REACTIVE AIRWAY DISEASE WITH ACUTE EXACERBATION: ICD-10-CM

## 2021-10-18 RX ORDER — FLUTICASONE PROPIONATE 50 MCG
SPRAY, SUSPENSION (ML) NASAL
Qty: 48 G | Refills: 1 | Status: SHIPPED | OUTPATIENT
Start: 2021-10-18

## 2021-11-15 DIAGNOSIS — R10.13 EPIGASTRIC PAIN: ICD-10-CM

## 2021-11-15 DIAGNOSIS — K21.9 GASTROESOPHAGEAL REFLUX DISEASE, UNSPECIFIED WHETHER ESOPHAGITIS PRESENT: ICD-10-CM

## 2021-11-16 RX ORDER — OMEPRAZOLE 20 MG/1
CAPSULE, DELAYED RELEASE ORAL
Qty: 180 CAPSULE | Refills: 1 | Status: SHIPPED | OUTPATIENT
Start: 2021-11-16 | End: 2022-05-05

## 2022-03-17 ENCOUNTER — TELEPHONE (OUTPATIENT)
Dept: INTERNAL MEDICINE CLINIC | Facility: CLINIC | Age: 47
End: 2022-03-17

## 2022-03-17 DIAGNOSIS — G89.4 CHRONIC PAIN SYNDROME: Primary | ICD-10-CM

## 2022-03-17 DIAGNOSIS — M54.12 CERVICAL RADICULOPATHY: ICD-10-CM

## 2022-03-17 DIAGNOSIS — M54.16 LUMBAR RADICULOPATHY: ICD-10-CM

## 2022-03-17 NOTE — TELEPHONE ENCOUNTER
Requesting new dr order for pain management, the one in the system is     Also wants you to add severe sciatica dx     # [phone]

## 2022-03-23 ENCOUNTER — TELEPHONE (OUTPATIENT)
Dept: PAIN MEDICINE | Facility: MEDICAL CENTER | Age: 47
End: 2022-03-23

## 2022-03-23 NOTE — TELEPHONE ENCOUNTER
OM for PT to CB to schedule  RE: [facility] to [facility]  Received: MD Kaye Kuo  Cc: [address] [facility] with me  Please set up 30 min appt since she is new to me              Previous Messages       ----- Message -----   From: Kaye Murray   Sent: 3/22/2022   1:54 PM EDT   To: Katerina Bhat MD, *   Subject: FW: [facility] es to [facility]                       Dr Kenyetta Martínez has released patient to you, will you accept?   ----- Message -----   From: Aditi Rodriguez MD   Sent: 3/22/2022   1:35 PM EDT   To: Renée Patten, *   Subject: RE: [facility] es to [facility]                       ok   ----- Message -----   From: Radha Guillen   Sent: 3/22/2022   1:27 PM EDT   To: Ariane Byrd MD, *   Subject: [facility]                           [facility] patient says she does not feel comfortable seeing es again   Call back# [phone]

## 2022-03-24 ENCOUNTER — HOSPITAL ENCOUNTER (EMERGENCY)
Facility: HOSPITAL | Age: 47
Discharge: HOME | End: 2022-03-24
Attending: EMERGENCY MEDICINE | Admitting: EMERGENCY MEDICINE
Payer: COMMERCIAL

## 2022-03-24 ENCOUNTER — APPOINTMENT (EMERGENCY)
Dept: RADIOLOGY | Facility: HOSPITAL | Age: 47
End: 2022-03-24
Payer: COMMERCIAL

## 2022-03-24 VITALS
DIASTOLIC BLOOD PRESSURE: 87 MMHG | RESPIRATION RATE: 20 BRPM | HEART RATE: 79 BPM | TEMPERATURE: 97.8 F | SYSTOLIC BLOOD PRESSURE: 121 MMHG | OXYGEN SATURATION: 97 %

## 2022-03-24 DIAGNOSIS — M54.16 LUMBAR RADICULOPATHY, ACUTE: ICD-10-CM

## 2022-03-24 DIAGNOSIS — M25.552 LEFT HIP PAIN: Primary | ICD-10-CM

## 2022-03-24 PROCEDURE — 99283 EMERGENCY DEPT VISIT LOW MDM: CPT

## 2022-03-24 PROCEDURE — 96372 THER/PROPH/DIAG INJ SC/IM: CPT

## 2022-03-24 PROCEDURE — 73502 X-RAY EXAM HIP UNI 2-3 VIEWS: CPT

## 2022-03-24 PROCEDURE — 99284 EMERGENCY DEPT VISIT MOD MDM: CPT | Performed by: PHYSICIAN ASSISTANT

## 2022-03-24 PROCEDURE — 72100 X-RAY EXAM L-S SPINE 2/3 VWS: CPT

## 2022-03-24 RX ORDER — METHYLPREDNISOLONE 4 MG/1
TABLET ORAL
Qty: 21 TABLET | Refills: 0 | Status: SHIPPED | OUTPATIENT
Start: 2022-03-24 | End: 2022-04-12 | Stop reason: ALTCHOICE

## 2022-03-24 RX ORDER — ACETAMINOPHEN 500 MG
500 TABLET ORAL EVERY 4 HOURS PRN
Qty: 30 TABLET | Refills: 0 | Status: SHIPPED | OUTPATIENT
Start: 2022-03-24

## 2022-03-24 RX ORDER — KETOROLAC TROMETHAMINE 30 MG/ML
30 INJECTION, SOLUTION INTRAMUSCULAR; INTRAVENOUS ONCE
Status: COMPLETED | OUTPATIENT
Start: 2022-03-24 | End: 2022-03-24

## 2022-03-24 RX ORDER — TRAMADOL HYDROCHLORIDE 50 MG/1
50 TABLET ORAL EVERY 6 HOURS PRN
Qty: 20 TABLET | Refills: 0 | Status: SHIPPED | OUTPATIENT
Start: 2022-03-24 | End: 2022-04-03

## 2022-03-24 RX ADMIN — KETOROLAC TROMETHAMINE 30 MG: 30 INJECTION, SOLUTION INTRAMUSCULAR at 11:18

## 2022-03-24 NOTE — ED PROVIDER NOTES
History  Chief Complaint   Patient presents with    Hip Pain     pt c/o L side back pain that radiates into L hip and leg  pt states pain started few months ago but worsened in past week  pt c/o tingling sensation in L foot     51 y o  female with past medical history significant for chronic neck and back pain, migraine headaches, anxiety, and prior head injury presents to ED with chief complaint of left hip pain   Onset of symptoms reported as 1 month ago  Location of symptoms reported as left hip radiating down left thigh  Quality is reported as shooting pain  Severity is reported as moderate  Associated symptoms: denies lower extremity paralysis or weakness  Denies fevers, denies rash  Denies urinary retention, denies bowel or bladder incontinence, denies abdominal pain, denies left knee or ankle pain  Modifying factors: movement exacerbates pain  Context: patient reports she slipped on some ice 1 month ago, stating "the floor came right out from under me" causing her to hyperextend her hip  Reports history of chronic pain in back and hip  States OTC medications not relieving symptoms  Reports pain radiates from hip down to left foot  Follows with pain management - awaiting lumbar spine injections for pain   Reviewed past medical history and visits via EPIC:  Patient was last seen in ed on 12/29/2019 for evaluation of irregular menses  History provided by:  Patient   used: No    Hip Pain  Associated symptoms: no abdominal pain, no diarrhea, no fatigue, no fever, no myalgias, no nausea, no rash and no vomiting        Prior to Admission Medications   Prescriptions Last Dose Informant Patient Reported? Taking?    ALPRAZolam (XANAX) 2 MG tablet  Self Yes No   Sig: Take by mouth 3 (three) times a day as needed for anxiety    FLUoxetine (PROzac) 20 mg capsule  Self Yes No   Sig: Take 30 mg by mouth daily    Lactobacillus (PROBIOTIC ACIDOPHILUS PO)  Self Yes No   Sig: Take by mouth as needed    SUMAtriptan (IMITREX) 100 mg tablet  Self No No   Sig: One p  O  At headache onset, may repeat after 2 hours p r n   Maximum 2/24 hours   albuterol (Ventolin HFA) 90 mcg/act inhaler  Self No No   Si-2 puffs inhaled every 4 to 6 hours as needed for wheezing or shortness of breath   diphenhydrAMINE (BENADRYL) 25 mg capsule  Self Yes No   Sig: Take 25 mg by mouth every 6 (six) hours as needed for itching   fluticasone (FLONASE) 50 mcg/act nasal spray   No No   Sig: INSTILL 1 SPRAY IN EACH NOSTRIL EVERY DAY   hyoscyamine (Anaspaz) 0 125 mg  Self No No   Sig: Take 1 tablet (0 125 mg total) by mouth every 4 (four) hours as needed (abdominal pain/cramping)   ibuprofen (MOTRIN) 800 mg tablet  Self No No   Sig: Take 1 tab by mouth every 8 hours as needed for pain   meclizine (ANTIVERT) 12 5 MG tablet  Self No No   Sig: Take 1 tablet (12 5 mg total) by mouth 3 (three) times a day as needed for dizziness   methocarbamol (ROBAXIN) 500 mg tablet  Self No No   Sig: Take 1 tablet (500 mg total) by mouth 2 (two) times a day as needed for muscle spasms   montelukast (SINGULAIR) 10 mg tablet   No No   Sig: TAKE 1 TABLET BY MOUTH EVERY NIGHT AT BEDTIME   nortriptyline (PAMELOR) 10 mg capsule  Self No No   Sig: TAKE 1 CAPSULE(10 MG) BY MOUTH DAILY AT BEDTIME   omeprazole (PriLOSEC) 20 mg delayed release capsule   No No   Sig: TAKE 2 CAPSULES BY MOUTH EVERY DAY   rosuvastatin (CRESTOR) 10 MG tablet   No No   Sig: TAKE 1 TABLET BY MOUTH DAILY      Facility-Administered Medications: None       Past Medical History:   Diagnosis Date    Anxiety     Back injury     Cervical radiculopathy     Closed fracture of transverse process of thoracic vertebra (HCC) 2021    Degenerative disc disease, cervical     pt states entire back    Depression     Head injury     hx of 4 concussions    High cholesterol     IBS (irritable bowel syndrome)     Lumbar radiculopathy     Memory loss     Migraine     Neck injury     Sciatic nerve pain, left        Past Surgical History:   Procedure Laterality Date    CYSTOSCOPY      EGD AND COLONOSCOPY  2015    SHOULDER SURGERY Right        Family History   Problem Relation Age of Onset   Nirmal Flowers ALS Mother     Multiple sclerosis Father     Multiple sclerosis Sister     No Known Problems Brother     No Known Problems Sister     No Known Problems Brother     Breast cancer Maternal Aunt     Breast cancer Paternal Aunt      I have reviewed and agree with the history as documented  E-Cigarette/Vaping    E-Cigarette Use Never User      E-Cigarette/Vaping Substances    Nicotine No     THC Yes     CBD Yes     Flavoring No     Other No     Unknown No      Social History     Tobacco Use    Smoking status: Current Some Day Smoker     Packs/day: 0 25     Years: 15 00     Pack years: 3 75     Types: Cigarettes    Smokeless tobacco: Never Used   Vaping Use    Vaping Use: Never used   Substance Use Topics    Alcohol use: Yes     Alcohol/week: 1 0 standard drink     Types: 1 Glasses of wine per week    Drug use: Yes     Types: Marijuana       Review of Systems   Constitutional: Negative for fatigue and fever  Gastrointestinal: Negative for abdominal pain, constipation, diarrhea, nausea and vomiting  Musculoskeletal: Positive for arthralgias  Negative for back pain, joint swelling, myalgias and neck pain  Skin: Negative for rash and wound  Neurological: Negative for dizziness, facial asymmetry, weakness, light-headedness and numbness  Psychiatric/Behavioral: The patient is nervous/anxious  All other systems reviewed and are negative  Physical Exam  Physical Exam  Vitals and nursing note reviewed  Constitutional:       General: She is not in acute distress  Appearance: Normal appearance  She is well-developed  She is not ill-appearing        Comments: /87 (BP Location: Left arm)   Pulse 79   Temp 97 8 °F (36 6 °C) (Oral)   Resp 20   SpO2 97%      HENT: Head: Normocephalic and atraumatic  Right Ear: External ear normal       Left Ear: External ear normal       Nose: Nose normal       Mouth/Throat:      Mouth: Mucous membranes are moist    Eyes:      General: No scleral icterus  Right eye: No discharge  Left eye: No discharge  Extraocular Movements: Extraocular movements intact  Conjunctiva/sclera: Conjunctivae normal    Cardiovascular:      Rate and Rhythm: Normal rate  Pulses: Normal pulses  Pulmonary:      Effort: Pulmonary effort is normal  No respiratory distress  Abdominal:      Tenderness: There is no abdominal tenderness  There is no guarding or rebound  Hernia: No hernia is present  Musculoskeletal:         General: Tenderness present  No swelling, deformity or signs of injury  Normal range of motion  Cervical back: Normal range of motion and neck supple  No rigidity or tenderness  No muscular tenderness  Lumbar back: Normal  No swelling, deformity, spasms, tenderness or bony tenderness  Normal range of motion  No scoliosis  Left hip: Tenderness present  No deformity, lacerations or crepitus  Normal strength  Left upper leg: Normal       Left knee: Normal       Left ankle: Normal       Left foot: Normal    Skin:     Capillary Refill: Capillary refill takes less than 2 seconds  Coloration: Skin is not jaundiced or pale  Findings: No erythema or rash  Neurological:      General: No focal deficit present  Mental Status: She is alert and oriented to person, place, and time  Mental status is at baseline  Cranial Nerves: No cranial nerve deficit  Sensory: No sensory deficit  Motor: No weakness  Gait: Gait normal    Psychiatric:         Mood and Affect: Mood normal          Behavior: Behavior normal          Thought Content:  Thought content normal          Judgment: Judgment normal          Vital Signs  ED Triage Vitals   Temperature Pulse Respirations Blood Pressure SpO2   03/24/22 1012 03/24/22 1012 03/24/22 1012 03/24/22 1012 03/24/22 1012   97 8 °F (36 6 °C) 103 20 116/70 97 %      Temp Source Heart Rate Source Patient Position - Orthostatic VS BP Location FiO2 (%)   03/24/22 1012 03/24/22 1012 03/24/22 1233 03/24/22 1012 --   Oral Monitor Lying Left arm       Pain Score       03/24/22 1118       10 - Worst Possible Pain           Vitals:    03/24/22 1012 03/24/22 1233   BP: 116/70 121/87   Pulse: 103 79   Patient Position - Orthostatic VS:  Lying         Visual Acuity      ED Medications  Medications   ketorolac (TORADOL) injection 30 mg (30 mg Intramuscular Given 3/24/22 1118)       Diagnostic Studies  Results Reviewed     None                 XR hip/pelv 2-3 vws left   Final Result by Travon Alexander MD (03/24 1543)      Normal left hip  Workstation performed: [id number]         XR lumbar spine 2 or 3 views   Final Result by Travon Alexander MD (03/24 1542)      No acute osseous abnormality  Degenerative changes as described  Workstation performed: [id number]                    Procedures  Procedures         ED Course                               SBIRT 22yo+      Most Recent Value   SBIRT (24 yo +)    In order to provide better care to our patients, we are screening all of our patients for alcohol and drug use  Would it be okay to ask you these screening questions? Yes Filed at: 03/24/2022 1037   Initial Alcohol Screen: US AUDIT-C     1  How often do you have a drink containing alcohol? 0 Filed at: 03/24/2022 1037   2  How many drinks containing alcohol do you have on a typical day you are drinking? 0 Filed at: 03/24/2022 1037   3a  Male UNDER 65: How often do you have five or more drinks on one occasion? 0 Filed at: 03/24/2022 1037   3b  FEMALE Any Age, or MALE 65+: How often do you have 4 or more drinks on one occassion?  0 Filed at: 03/24/2022 1037   Audit-C Score 0 Filed at: 03/24/2022 1037   MICHAEL: How many times in the past year have you    Used an illegal drug or used a prescription medication for non-medical reasons? Never Filed at: 03/24/2022 [address] [place] [address]  Number of Diagnoses or Management Options  Left hip pain: new and requires workup  Lumbar radiculopathy, acute: new and requires workup  Diagnosis management comments: [facility]  ED Summary: 51year old female with history of chronic neck, back and hip pain presents to ED with left hip pain x 1 month  No acute injury      No neurological deficits on exam       DDX:  ddx includes but is not limited to fracture, contusion, sprain, strain, nerve injury, tendon injury, vascular injury, tendinitis, bursitis, dislocation, plan xray to rule out fracture or dislocation  Initial ED Plan: check xray    ED Results:  Reviewed at bedside: Xray images left hip and pelvis independently visualized and interpreted by me - no acute fracture or dislocation  Xray images lumbar spine independently visualized and interpreted by me - no acute vertebral compression fracture or severe subluxation  ED Final Assessment 1  Acute on chronic left hip pain  No fracture on xray  Will treat with tramadol and tylenol for pain  No fracture on xray  Instructed rest, ice, elevate, avoid aggravating activities  Use crutches prn for pain  Follow up with pcp, ortho/pain management in 3-5 days for further evaluation and treatment of symptoms  I discussed diagnosis and treatment plan with patient at bedside  Extended discussion with patient regarding the diagnosis, pathophysiology, expectant coarse and treatment plan  Instructed to follow up with pcp and recommended specialist in 3-5 days  Reviewed reasons to return to ed  Patient verbalized understanding of diagnosis and agreement with discharge plan of care as well as understanding of reasons to return to ed               Amount and/or Complexity of Data Reviewed  Tests in the radiology section of CPT®: ordered and reviewed  Discussion of test results with the performing providers: yes  Review and summarize past medical records: yes  Independent visualization of images, tracings, or specimens: yes    Risk of Complications, Morbidity, and/or Mortality  General comments: Disclaimers:    I have reasonably determine that electronically prescribing a controlled substance would be impractical for the patient to obtain the controlled substance prescribed by electronic prescription or would cause an untimely delay resulting in an adverse impact on the patient's medical condition        Patient was seen during the outbreak of the corona virus epidemic   Resources are limited due to the severity of patient illnesses associated with virus   Testing is also limited at this time   Discussed with patient at the time of this evaluation   Due to the fact that limited resources are available -treatment options are limited  Patient Progress  Patient progress: stable      Disposition  Final diagnoses:   Left hip pain   Lumbar radiculopathy, acute     Time reflects when diagnosis was documented in both MDM as applicable and the Disposition within this note     Time User Action Codes Description Comment    3/24/2022 12:23 PM Donjose mariae Blue Add [M25 552] Left hip pain     3/24/2022 12:23 PM Donjose mariae Blue Add [M54 16] Lumbar radiculopathy, acute       ED Disposition     ED Disposition Condition Date/Time Comment    Discharge Stable Thu Mar 24, 2022 12:23 PM [address] discharge to home/self care              Follow-up Information     Follow up With Specialties Details Why Contact Info Additional [id number] [facility], DO Internal Medicine Call in 2 days for further evaluation of symptoms [address] Emergency Department Emergency Medicine Go to  If symptoms worsen [address] Emergency Department, [facility], [address] [facility], [place] [address], MD Pain Medicine, Anesthesiology Call in 2 days for further evaluation of symptoms [address] [facility] [address]  [phone]             Discharge Medication List as of 3/24/2022 12:25 PM      START taking these medications    Details   acetaminophen (TYLENOL) 500 mg tablet Take 1 tablet (500 mg total) by mouth every 4 (four) hours as needed for moderate pain (back paiin/initial rx ), Starting u 3/24/2022, Normal      traMADol (Ultram) 50 mg tablet Take 1 tablet (50 mg total) by mouth every 6 (six) hours as needed for moderate pain (hip pain/initial rx ) for up to 10 days, Starting Thu 3/24/2022, Until Sun 4/3/2022 at 2359, Normal         CONTINUE these medications which have NOT CHANGED    Details   albuterol (Ventolin HFA) 90 mcg/act inhaler 1-2 puffs inhaled every 4 to 6 hours as needed for wheezing or shortness of breath, Normal      ALPRAZolam (XANAX) 2 MG tablet Take by mouth 3 (three) times a day as needed for anxiety , Historical Med      diphenhydrAMINE (BENADRYL) 25 mg capsule Take 25 mg by mouth every 6 (six) hours as needed for itching, Historical Med      FLUoxetine (PROzac) 20 mg capsule Take 30 mg by mouth daily , Historical Med      fluticasone (FLONASE) 50 mcg/act nasal spray INSTILL 1 SPRAY IN EACH NOSTRIL EVERY DAY, Normal      hyoscyamine (Anaspaz) 0 125 mg Take 1 tablet (0 125 mg total) by mouth every 4 (four) hours as needed (abdominal pain/cramping), Starting u 2/4/2021, Normal      ibuprofen (MOTRIN) 800 mg tablet Take 1 tab by mouth every 8 hours as needed for pain, Normal      Lactobacillus (PROBIOTIC ACIDOPHILUS PO) Take by mouth as needed , Historical Med      meclizine (ANTIVERT) 12 5 MG tablet Take 1 tablet (12 5 mg total) by mouth 3 (three) times a day as needed for dizziness, Starting u 6/10/2021, Normal      methocarbamol (ROBAXIN) 500 mg tablet Take 1 tablet (500 mg total) by mouth 2 (two) times a day as needed for muscle spasms, Starting Mon 7/26/2021, Normal      montelukast (SINGULAIR) 10 mg tablet TAKE 1 TABLET BY MOUTH EVERY NIGHT AT BEDTIME, Normal      nortriptyline (PAMELOR) 10 mg capsule TAKE 1 CAPSULE(10 MG) BY MOUTH DAILY AT BEDTIME, Normal      omeprazole (PriLOSEC) 20 mg delayed release capsule TAKE 2 CAPSULES BY MOUTH EVERY DAY, Normal      rosuvastatin (CRESTOR) 10 MG tablet TAKE 1 TABLET BY MOUTH DAILY, Normal      SUMAtriptan (IMITREX) 100 mg tablet One p  O  At headache onset, may repeat after 2 hours p r n  Maximum 2/24 hours, Normal             No discharge procedures on file      PDMP Review       Value Time User    PDMP Reviewed  Yes 6/10/2021  3:28 PM Dipika Mahajan DO          ED Provider  Electronically Signed by           Tremayne Calzada PA-C  03/25/22 4937

## 2022-03-25 ENCOUNTER — OFFICE VISIT (OUTPATIENT)
Dept: INTERNAL MEDICINE CLINIC | Facility: CLINIC | Age: 47
End: 2022-03-25
Payer: COMMERCIAL

## 2022-03-25 VITALS — SYSTOLIC BLOOD PRESSURE: 120 MMHG | DIASTOLIC BLOOD PRESSURE: 80 MMHG | HEART RATE: 80 BPM

## 2022-03-25 DIAGNOSIS — F33.9 MAJOR DEPRESSION, RECURRENT, CHRONIC (HCC): ICD-10-CM

## 2022-03-25 DIAGNOSIS — M48.061 LUMBAR FORAMINAL STENOSIS: ICD-10-CM

## 2022-03-25 DIAGNOSIS — M51.26 LUMBAR DISC HERNIATION: ICD-10-CM

## 2022-03-25 DIAGNOSIS — M54.16 LUMBAR RADICULOPATHY: Primary | ICD-10-CM

## 2022-03-25 PROBLEM — S22.009A CLOSED FRACTURE OF TRANSVERSE PROCESS OF THORACIC VERTEBRA (HCC): Status: RESOLVED | Noted: 2021-06-26 | Resolved: 2022-03-25

## 2022-03-25 PROCEDURE — 4004F PT TOBACCO SCREEN RCVD TLK: CPT | Performed by: INTERNAL MEDICINE

## 2022-03-25 PROCEDURE — 99214 OFFICE O/P EST MOD 30 MIN: CPT | Performed by: INTERNAL MEDICINE
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
## 2022-03-25 NOTE — PATIENT INSTRUCTIONS
Lumbar Radiculopathy   WHAT YOU NEED TO KNOW:   What is lumbar radiculopathy? Lumbar radiculopathy is a painful condition that happens when a nerve in your lumbar spine (lower back) is pinched or irritated  Nerves control feeling and movement in your body  What causes lumbar radiculopathy? You may get a pinched nerve in your lumbar spine if you have disc damage  Discs are natural, spongy cushions between your vertebrae (back bones) that allow your spine to move  Your discs may move out of place and pinch the nerve in your spine  Your risk for a pinched nerve and lumbar radiculopathy increases if:  · You smoke  · You have diabetes, a spinal infection, or a growth in your spine  · You are overweight  · You are male  · You are elderly  What are the signs and symptoms of lumbar radiculopathy? You may have any of the following:  · Pain that moves from your lower back to your buttocks, groin, and the back of your leg  The pain is often felt below your knee  Your pain may worsen when you cough, sneeze, stand, or sit  · Numbness, weakness, or tingling in your back or legs  How is lumbar radiculopathy diagnosed? Your healthcare provider will examine you and ask about your family history of back and leg pain  He may also test you for weakness, numbness, or tingling in your back, buttocks, and legs  Your healthcare provider may ask you to lie on your back and lift your leg to locate your pain  You may have any of the following:  · Blood tests: You may need blood taken to give healthcare providers information about how your body is working  The blood may be taken from your hand, arm, or IV  · Magnetic resonance imaging (MRI): An MRI machine is used to take a picture of your lower back  Your healthcare provider will use this picture to check for problems and changes in your back bones, nerves, and discs  You will need to lie still during this test  The MRI machine contains a very powerful magnet  Never enter the MRI room with any metal objects  This can cause serious injury  Tell your healthcare provider if you have any metal implants in your body  · X-ray: An x-ray is a picture of your lower back  A lumbar x-ray may show signs of infection or other problems with your spine  · An electromyography (EMG)  test measures the electrical activity of your muscles at rest and with movement  · Computed tomography (CT) scan: A special x-ray machine uses a computer to take pictures of your lower back  It may be used to look at your bones, discs, and nerves  You may be given dye in your IV to help improve the pictures  Tell your healthcare provider if you are allergic to shellfish, or have other allergies or medical conditions  People who are allergic to iodine or shellfish (lobster, crab, or shrimp) may be allergic to some dyes  How is lumbar radiculopathy treated? Treatment of lumbar radiculopathy may reduce pain and swelling, and improve your ability to walk and do your normal activities  Ask your healthcare provider for more information about these and other treatments for lumbar radiculopathy:  · Medicines:     ? NSAIDs , such as ibuprofen, help decrease swelling, pain, and fever  This medicine is available with or without a doctor's order  NSAIDs can cause stomach bleeding or kidney problems in certain people  If you take blood thinner medicine, always ask your healthcare provider if NSAIDs are safe for you  Always read the medicine label and follow directions  ? Muscle relaxers  help decrease pain and muscle spasms  ? Opioids: This is a strong medicine given to reduce severe pain  It is also called narcotic pain medicine  Take this medicine exactly as directed by your healthcare provider  ? Oral steroids: Steroids may be given to reduce swelling and pain  ? Steroid injections: Healthcare providers may give you steroid medicine through a needle (shot) into your lumbar spine   This may help decrease your nerve pain and swelling  You may need more than 1 injection if your symptoms do not improve after the first treatment  · Physical therapy: Your healthcare provider may suggest physical therapy  Your physical therapist may teach you certain exercises to improve posture (the way you stand and sit), flexibility, and strength in your lower back  He may also teach you how to remain safely active and avoid further injury  Follow the exercise plan given to you by your physical therapist     · Transcutaneous electrical nerve stimulation: This treatment, called TENS, stimulates your nerves and may decrease your pain  Wires are attached to pads  The pads are attached to your skin  The wires send a mild current through your nerves  · Surgery: You may need surgery to relieve your pinched nerve if your condition has not improved within 4 to 6 weeks  You may also need it if you have lumbar radiculopathy more than once  What are the risks of treatment for lumbar radiculopathy? · Without treatment, your pain may worsen  The pinched and swollen nerve may lead to problems when you walk or move  In severe cases, you may lose control of your urine or bowel movements  Bedrest can make your symptoms worse  · Pain medicines can cause vomiting, upset stomach, constipation (large, hard bowel movements that are difficult to pass), or kidney or liver problems  Opioid medicine may be addictive (hard to quit taking once you start)  Oral steroids and steroid injections may have side effects, such as facial redness, fluid retention (water weight gain), and mood changes  Steroid injections may be painful and can cause severe headaches, infections, allergic reactions, or nerve damage  Surgery risks include delayed problems with healing, spinal or bladder infection, damage to the spinal cord or other nerves, and ongoing pain  In rare cases, you could become paralyzed (not able to move your arms or legs)      How can I care for myself when I have lumbar radiculopathy? · Stay active: It is best to be active when you have lumbar radiculopathy  Your healthcare provider may tell you to take walks to ease yourself back into your daily routine  Avoid long periods of bed rest  Bed rest could worsen your symptoms  Do not move in ways that increase your pain  Ask your healthcare provider for more information about the best ways to stay active  · Use ice or heat packs:  Use ice or heat packs on the sore area of your body to decrease the pain and swelling  Put ice in a plastic bag covered with a towel on your low back  Cover heated items with a towel to avoid burns  Use ice and heat as directed  · Avoid heavy lifting: Your condition may worsen if you lift heavy things  Avoid lifting if possible  · Maintain a healthy weight:  Excess body weight may strain your back  Talk with your healthcare provider about ways to lose excess weight if you are overweight  When should I contact my healthcare provider? · Your pain does not improve within 1 to 3 weeks after treatment  · Your pain and weakness keep you from your normal activities at work, home, or school  · You lose more than 10 pounds in 6 months without trying  · You become depressed or sad because of the pain  · You have questions or concerns about your condition or care  When should I seek immediate care or call 911? · You have a fever greater than 100 4°F for longer than 2 days  · You have new, severe back or leg pain, or your pain spreads to both legs  · You have any new signs of numbness or weakness, especially in your lower back, legs, arms, or genital area  · You have new trouble controlling your urine and bowel movements  · You do not feel like your bladder empties when you urinate  CARE AGREEMENT:   You have the right to help plan your care  Learn about your health condition and how it may be treated   Discuss treatment options with your healthcare providers to decide what care you want to receive  You always have the right to refuse treatment  The above information is an  only  It is not intended as medical advice for individual conditions or treatments  Talk to your doctor, nurse or pharmacist before following any medical regimen to see if it is safe and effective for you  © Copyright [employer] 2022 Information is for End User's use only and may not be sold, redistributed or otherwise used for commercial purposes   All illustrations and images included in CareNotes® are the copyrighted property of A D [employer] M [employer]  or [address] [place] [address]

## 2022-04-03 ENCOUNTER — HOSPITAL ENCOUNTER (OUTPATIENT)
Dept: MRI IMAGING | Facility: HOSPITAL | Age: 47
Discharge: HOME | End: 2022-04-03
Attending: INTERNAL MEDICINE
Payer: COMMERCIAL

## 2022-04-03 DIAGNOSIS — M51.26 LUMBAR DISC HERNIATION: ICD-10-CM

## 2022-04-03 DIAGNOSIS — M48.061 LUMBAR FORAMINAL STENOSIS: ICD-10-CM

## 2022-04-03 DIAGNOSIS — M54.16 LUMBAR RADICULOPATHY: ICD-10-CM

## 2022-04-03 PROCEDURE — G1004 CDSM NDSC: HCPCS

## 2022-04-03 PROCEDURE — 72148 MRI LUMBAR SPINE W/O DYE: CPT

## 2022-04-05 ENCOUNTER — TELEPHONE (OUTPATIENT)
Dept: INTERNAL MEDICINE CLINIC | Facility: CLINIC | Age: 47
End: 2022-04-05

## 2022-04-05 NOTE — TELEPHONE ENCOUNTER
Patient was told ans will keep pain management appt   But wants to know if she can get some thing for the unbearable pain that she is for the next week

## 2022-04-05 NOTE — TELEPHONE ENCOUNTER
There is no additional medication to prescribe other than what she has already been given   Only thing stronger is opiates, but I don't prescribe that for disc herniations/back problems

## 2022-04-05 NOTE — TELEPHONE ENCOUNTER
----- Message from Julianna Bui DO sent at 4/5/2022  3:47 PM EDT -----  MRI confirms that she has bad disc herniation at L4-L5 level that is causing pinching of the nerve  This is likely responsible for her pain   Follow-up with pain management as scheduled

## 2022-04-12 ENCOUNTER — OFFICE VISIT (OUTPATIENT)
Dept: PAIN MEDICINE | Facility: CLINIC | Age: 47
End: 2022-04-12
Payer: COMMERCIAL

## 2022-04-12 VITALS
DIASTOLIC BLOOD PRESSURE: 68 MMHG | WEIGHT: 157.6 LBS | HEIGHT: 62 IN | RESPIRATION RATE: 18 BRPM | SYSTOLIC BLOOD PRESSURE: 95 MMHG | HEART RATE: 80 BPM | BODY MASS INDEX: 29 KG/M2

## 2022-04-12 DIAGNOSIS — M51.16 LUMBAR DISC HERNIATION WITH RADICULOPATHY: ICD-10-CM

## 2022-04-12 DIAGNOSIS — G89.4 CHRONIC PAIN SYNDROME: Primary | ICD-10-CM

## 2022-04-12 DIAGNOSIS — G89.29 CHRONIC LEFT-SIDED LOW BACK PAIN WITH LEFT-SIDED SCIATICA: ICD-10-CM

## 2022-04-12 DIAGNOSIS — M54.42 CHRONIC LEFT-SIDED LOW BACK PAIN WITH LEFT-SIDED SCIATICA: ICD-10-CM

## 2022-04-12 PROCEDURE — 99214 OFFICE O/P EST MOD 30 MIN: CPT | Performed by: ANESTHESIOLOGY

## 2022-04-12 PROCEDURE — 4004F PT TOBACCO SCREEN RCVD TLK: CPT | Performed by: ANESTHESIOLOGY

## 2022-04-12 PROCEDURE — 3008F BODY MASS INDEX DOCD: CPT | Performed by: ANESTHESIOLOGY

## 2022-04-12 RX ORDER — GABAPENTIN 300 MG/1
300 CAPSULE ORAL 3 TIMES DAILY
Qty: 90 CAPSULE | Refills: 0 | Status: SHIPPED | OUTPATIENT
Start: 2022-04-12

## 2022-04-12 NOTE — PROGRESS NOTES
Pain Medicine Follow-Up Note    Assessment:  1  Chronic pain syndrome    2  Chronic left-sided low back pain with left-sided sciatica    3  Lumbar disc herniation with radiculopathy        Plan:  My impressions and treatment recommendations were discussed in detail with the patient who verbalized understanding and had no further questions  The patient is reporting low back pain and left lower extremity radiculopathy in what appears to be the left L4 and L5 distribution in the context of a lumbar disc herniation  At this point, I think it is reasonable to proceed with a left L4 and L5 transforaminal epidural steroid injection since this could be potentially therapeutic  The procedures, its risks, and benefits were explained in detail to the patient  Risks include but are not limited to bleeding, infection, hematoma formation, abscess formation, weakness, headache, failure the pain to improve, nerve irritation or damage, and potential worsening of the pain  The patient verbalized understanding and wished to proceed with the procedure  I also felt a reasonable to have the patient start gabapentin on a titration schedule to a goal dosage of gabapentin 300 mg 3 times daily  Side effects were reviewed with the patient  Follow-up is planned in 4 weeks time or sooner as warranted  Discharge instructions were provided  I personally saw and examined the patient and I agree with the above discussed plan of care  History of Present Illness:    Lyubov Anglin is a 51 y o  female who presents to [facility] for interval re-evaluation of the above stated pain complaints  The patient has a past medical and chronic pain history as outlined in the assessment section  She was last seen on July 26, 2021  At today's office visit, the patient's pain score is 9/10 on the verbal numerical pain rating scale    Patient states that her symptoms are primarily in her low back and left lower extremity in what appears to be the left L4 and L5 distributions  She describes her pain as worse in the morning, evening, and night  Her pain is constant in nature  She reports the quality of her pain as burning, sharp, throbbing, cramping, pressure-like, shooting, pins and needles, and stabbing  She is reporting that her symptoms are bothering her considerably and she would like to move forward with an interventional pain procedure  Other than as stated above, the patient denies any interval changes in medications, medical condition, mental condition, symptoms, or allergies since the last office visit  Review of Systems:    Review of Systems   Respiratory: Negative for shortness of breath  Cardiovascular: Negative for chest pain  Gastrointestinal: Negative for constipation, diarrhea, nausea and vomiting  Musculoskeletal: Negative for arthralgias, gait problem, joint swelling and myalgias  Skin: Negative for rash  Neurological: Negative for dizziness, seizures and weakness  All other systems reviewed and are negative          Patient Active Problem List   Diagnosis    Cervical radiculopathy    Cervical disc disorder with radiculopathy of mid-cervical region    Lumbar radiculopathy    Major depression, recurrent, chronic (HCC)    Anxiety    Mixed hyperlipidemia    Chronic pain syndrome    Displaced fracture of lateral end of right clavicle with routine healing       Past Medical History:   Diagnosis Date    Anxiety     Back injury     Cervical radiculopathy     Closed fracture of transverse process of thoracic vertebra ([facility] [address] ) 6/26/2021    Degenerative disc disease, cervical     pt states entire back    Depression     Head injury     hx of 4 concussions    High cholesterol     IBS (irritable bowel syndrome)     Lumbar radiculopathy     Memory loss     Migraine     Neck injury     Sciatic nerve pain, left        Past Surgical History:   Procedure Laterality Date    CYSTOSCOPY      EGD AND COLONOSCOPY  2015    SHOULDER SURGERY Right        Family History   Problem Relation Age of Onset   Emaline Folds ALS Mother     Multiple sclerosis Father     Multiple sclerosis Sister     No Known Problems Brother     No Known Problems Sister     No Known Problems Brother     Breast cancer Maternal Aunt     Breast cancer Paternal Aunt        Social History     Occupational History    Not on file   Tobacco Use    Smoking status: Current Some Day Smoker     Packs/day: 0 25     Years: 15 00     Pack years: 3 75     Types: Cigarettes    Smokeless tobacco: Never Used   Vaping Use    Vaping Use: Never used   Substance and Sexual Activity    Alcohol use: Not Currently     Alcohol/week: 1 0 standard drink     Types: 1 Glasses of wine per week    Drug use: Yes     Types: Marijuana    Sexual activity: Yes     Partners: Male     Comment: not for the last 2 months          Current Outpatient Medications:     acetaminophen (TYLENOL) 500 mg tablet, Take 1 tablet (500 mg total) by mouth every 4 (four) hours as needed for moderate pain (back paiin/initial rx ), Disp: 30 tablet, Rfl: 0    albuterol (Ventolin HFA) 90 mcg/act inhaler, 1-2 puffs inhaled every 4 to 6 hours as needed for wheezing or shortness of breath, Disp: 18 g, Rfl: 5    ALPRAZolam (XANAX) 2 MG tablet, Take by mouth 3 (three) times a day as needed for anxiety , Disp: , Rfl:     FLUoxetine (PROzac) 20 mg capsule, Take 40 mg by mouth daily  , Disp: , Rfl:     fluticasone (FLONASE) 50 mcg/act nasal spray, INSTILL 1 SPRAY IN EACH NOSTRIL EVERY DAY, Disp: 48 g, Rfl: 1    montelukast (SINGULAIR) 10 mg tablet, TAKE 1 TABLET BY MOUTH EVERY NIGHT AT BEDTIME, Disp: 90 tablet, Rfl: 1    omeprazole (PriLOSEC) 20 mg delayed release capsule, TAKE 2 CAPSULES BY MOUTH EVERY DAY, Disp: 180 capsule, Rfl: 1    ibuprofen (MOTRIN) 800 mg tablet, Take 1 tab by mouth every 8 hours as needed for pain (Patient not taking: Reported on 4/12/2022 ), Disp: 90 tablet, Rfl: 0    Allergies   Allergen Reactions    Topamax [Topiramate] Headache     Severe HA and body aches      Methylprednisolone Other (See Comments) and Hyperactivity     insomnia    Cyclobenzaprine Anxiety    Naproxen Anxiety       Physical Exam:    BP 95/68   Pulse 80   Resp 18   Ht 5' 2" (1 575 m)   Wt 71 5 kg (157 lb 9 6 oz)   BMI 28 83 kg/m²     Constitutional:normal, well developed, well nourished, alert, in no distress and non-toxic and no overt pain behavior  Eyes:anicteric  HEENT:grossly intact  Neck:supple, symmetric, trachea midline and no masses   Pulmonary:even and unlabored  Cardiovascular:No edema or pitting edema present  Skin:Normal without rashes or lesions and well hydrated  Psychiatric:Mood and affect appropriate  Neurologic:Cranial Nerves II-XII grossly intact  Musculoskeletal:normal, tenderness noted in the left lumbar paraspinal region  There is mild tenderness noted over the trochanteric bursae  There is mild tenderness noted fit over the bilateral sacroiliac joint  Lumbar flexion and extension are intact  Imaging  No orders to display   LUMBAR SPINE  03/24/2022     INDICATION:   back pain radiates down leg      COMPARISON:  9/16/2019     VIEWS:  XR SPINE LUMBAR 2 OR 3 VIEWS INJURY      FINDINGS:     There are 5 non rib bearing lumbar vertebral bodies       There is no evidence of acute fracture or destructive osseous lesion      Alignment is unremarkable       There is mild disc space narrowing at L3-4 and L4-5 with small osteophytes      The pedicles appear intact      Soft tissues are unremarkable      IMPRESSION:     No acute osseous abnormality        Degenerative changes as described  MRI LUMBAR SPINE WITHOUT CONTRAST  04/03/2022     INDICATION: M54 16: Radiculopathy, lumbar region  M51 26:  Other intervertebral disc displacement, lumbar region  M48 061: Spinal stenosis, lumbar region without neurogenic claudication      COMPARISON:  None      TECHNIQUE: Sagittal T1, sagittal T2, sagittal inversion recovery, axial T1 and axial T2, coronal T2     IMAGE QUALITY:  Diagnostic     FINDINGS:     VERTEBRAL BODIES:  There are 5 lumbar type vertebral bodies  Minor focal dextroscoliosis of the mid to lower lumbar spine  No spondylolisthesis or spondylolysis  No compression fracture  Normal marrow signal is identified within the visualized bony   structures  No discrete marrow lesion      SACRUM:  Normal signal within the sacrum  No evidence of insufficiency or stress fracture      DISTAL CORD AND CONUS:  Normal size and signal within the distal cord and conus      PARASPINAL SOFT TISSUES:  Paraspinal soft tissues are unremarkable      LOWER THORACIC DISC SPACES:  Normal disc height and signal   No disc herniation, canal stenosis or foraminal narrowing      LUMBAR DISC SPACES:     L1-L2:  Normal      L2-L3:  There is small focal left subarticular and foraminal disc protrusion  No mass effect upon the thecal sac  No right-sided foraminal narrowing  There is mild left foraminal narrowing present      L3-L4:  Disc desiccation and loss of disc height  Mild diffuse annular bulging  Small left subarticular and foraminal disc protrusion  There is mild canal stenosis and mild bilateral foraminal narrowing      L4-L5:  Disc desiccation with slight loss of disc height  Mild diffuse annular bulging  There is a focal left foraminal disc extrusion superiorly displacing and compressing the exiting  Severe left foraminal narrowing  Mild right foraminal narrowing   is present as well      L5-S1:  Normal disc height and signal   Minor facet arthropathy    No canal stenosis or foraminal narrowing      IMPRESSION:     Severe left foraminal narrowing at the L4-5 level secondary to an extruded foraminal disc herniation, see series 3 images 3 and 4 where there is significant compression of the exiting nerve      Additional smaller disc herniations are noted at the L2-3, L3-4 levels with mild foraminal narrowing  No orders of the defined types were placed in this encounter

## 2022-04-12 NOTE — H&P (VIEW-ONLY)
Pain Medicine Follow-Up Note    Assessment:  1  Chronic pain syndrome    2  Chronic left-sided low back pain with left-sided sciatica    3  Lumbar disc herniation with radiculopathy        Plan:  My impressions and treatment recommendations were discussed in detail with the patient who verbalized understanding and had no further questions  The patient is reporting low back pain and left lower extremity radiculopathy in what appears to be the left L4 and L5 distribution in the context of a lumbar disc herniation  At this point, I think it is reasonable to proceed with a left L4 and L5 transforaminal epidural steroid injection since this could be potentially therapeutic  The procedures, its risks, and benefits were explained in detail to the patient  Risks include but are not limited to bleeding, infection, hematoma formation, abscess formation, weakness, headache, failure the pain to improve, nerve irritation or damage, and potential worsening of the pain  The patient verbalized understanding and wished to proceed with the procedure  I also felt a reasonable to have the patient start gabapentin on a titration schedule to a goal dosage of gabapentin 300 mg 3 times daily  Side effects were reviewed with the patient  Follow-up is planned in 4 weeks time or sooner as warranted  Discharge instructions were provided  I personally saw and examined the patient and I agree with the above discussed plan of care  History of Present Illness:    Soy Vela is a 51 y o  female who presents to [facility] for interval re-evaluation of the above stated pain complaints  The patient has a past medical and chronic pain history as outlined in the assessment section  She was last seen on July 26, 2021  At today's office visit, the patient's pain score is 9/10 on the verbal numerical pain rating scale    Patient states that her symptoms are primarily in her low back and left lower extremity in what appears to be the left L4 and L5 distributions  She describes her pain as worse in the morning, evening, and night  Her pain is constant in nature  She reports the quality of her pain as burning, sharp, throbbing, cramping, pressure-like, shooting, pins and needles, and stabbing  She is reporting that her symptoms are bothering her considerably and she would like to move forward with an interventional pain procedure  Other than as stated above, the patient denies any interval changes in medications, medical condition, mental condition, symptoms, or allergies since the last office visit  Review of Systems:    Review of Systems   Respiratory: Negative for shortness of breath  Cardiovascular: Negative for chest pain  Gastrointestinal: Negative for constipation, diarrhea, nausea and vomiting  Musculoskeletal: Negative for arthralgias, gait problem, joint swelling and myalgias  Skin: Negative for rash  Neurological: Negative for dizziness, seizures and weakness  All other systems reviewed and are negative          Patient Active Problem List   Diagnosis    Cervical radiculopathy    Cervical disc disorder with radiculopathy of mid-cervical region    Lumbar radiculopathy    Major depression, recurrent, chronic (HCC)    Anxiety    Mixed hyperlipidemia    Chronic pain syndrome    Displaced fracture of lateral end of right clavicle with routine healing       Past Medical History:   Diagnosis Date    Anxiety     Back injury     Cervical radiculopathy     Closed fracture of transverse process of thoracic vertebra ([facility] [address] ) 6/26/2021    Degenerative disc disease, cervical     pt states entire back    Depression     Head injury     hx of 4 concussions    High cholesterol     IBS (irritable bowel syndrome)     Lumbar radiculopathy     Memory loss     Migraine     Neck injury     Sciatic nerve pain, left        Past Surgical History:   Procedure Laterality Date    CYSTOSCOPY      EGD AND COLONOSCOPY  2015    SHOULDER SURGERY Right        Family History   Problem Relation Age of Onset   Skylar Kruger ALS Mother     Multiple sclerosis Father     Multiple sclerosis Sister     No Known Problems Brother     No Known Problems Sister     No Known Problems Brother     Breast cancer Maternal Aunt     Breast cancer Paternal Aunt        Social History     Occupational History    Not on file   Tobacco Use    Smoking status: Current Some Day Smoker     Packs/day: 0 25     Years: 15 00     Pack years: 3 75     Types: Cigarettes    Smokeless tobacco: Never Used   Vaping Use    Vaping Use: Never used   Substance and Sexual Activity    Alcohol use: Not Currently     Alcohol/week: 1 0 standard drink     Types: 1 Glasses of wine per week    Drug use: Yes     Types: Marijuana    Sexual activity: Yes     Partners: Male     Comment: not for the last 2 months          Current Outpatient Medications:     acetaminophen (TYLENOL) 500 mg tablet, Take 1 tablet (500 mg total) by mouth every 4 (four) hours as needed for moderate pain (back paiin/initial rx ), Disp: 30 tablet, Rfl: 0    albuterol (Ventolin HFA) 90 mcg/act inhaler, 1-2 puffs inhaled every 4 to 6 hours as needed for wheezing or shortness of breath, Disp: 18 g, Rfl: 5    ALPRAZolam (XANAX) 2 MG tablet, Take by mouth 3 (three) times a day as needed for anxiety , Disp: , Rfl:     FLUoxetine (PROzac) 20 mg capsule, Take 40 mg by mouth daily  , Disp: , Rfl:     fluticasone (FLONASE) 50 mcg/act nasal spray, INSTILL 1 SPRAY IN EACH NOSTRIL EVERY DAY, Disp: 48 g, Rfl: 1    montelukast (SINGULAIR) 10 mg tablet, TAKE 1 TABLET BY MOUTH EVERY NIGHT AT BEDTIME, Disp: 90 tablet, Rfl: 1    omeprazole (PriLOSEC) 20 mg delayed release capsule, TAKE 2 CAPSULES BY MOUTH EVERY DAY, Disp: 180 capsule, Rfl: 1    ibuprofen (MOTRIN) 800 mg tablet, Take 1 tab by mouth every 8 hours as needed for pain (Patient not taking: Reported on 4/12/2022 ), Disp: 90 tablet, Rfl: 0    Allergies   Allergen Reactions    Topamax [Topiramate] Headache     Severe HA and body aches      Methylprednisolone Other (See Comments) and Hyperactivity     insomnia    Cyclobenzaprine Anxiety    Naproxen Anxiety       Physical Exam:    BP 95/68   Pulse 80   Resp 18   Ht 5' 2" (1 575 m)   Wt 71 5 kg (157 lb 9 6 oz)   BMI 28 83 kg/m²     Constitutional:normal, well developed, well nourished, alert, in no distress and non-toxic and no overt pain behavior  Eyes:anicteric  HEENT:grossly intact  Neck:supple, symmetric, trachea midline and no masses   Pulmonary:even and unlabored  Cardiovascular:No edema or pitting edema present  Skin:Normal without rashes or lesions and well hydrated  Psychiatric:Mood and affect appropriate  Neurologic:Cranial Nerves II-XII grossly intact  Musculoskeletal:normal, tenderness noted in the left lumbar paraspinal region  There is mild tenderness noted over the trochanteric bursae  There is mild tenderness noted fit over the bilateral sacroiliac joint  Lumbar flexion and extension are intact  Imaging  No orders to display   LUMBAR SPINE  03/24/2022     INDICATION:   back pain radiates down leg      COMPARISON:  9/16/2019     VIEWS:  XR SPINE LUMBAR 2 OR 3 VIEWS INJURY      FINDINGS:     There are 5 non rib bearing lumbar vertebral bodies       There is no evidence of acute fracture or destructive osseous lesion      Alignment is unremarkable       There is mild disc space narrowing at L3-4 and L4-5 with small osteophytes      The pedicles appear intact      Soft tissues are unremarkable      IMPRESSION:     No acute osseous abnormality        Degenerative changes as described  MRI LUMBAR SPINE WITHOUT CONTRAST  04/03/2022     INDICATION: M54 16: Radiculopathy, lumbar region  M51 26:  Other intervertebral disc displacement, lumbar region  M48 061: Spinal stenosis, lumbar region without neurogenic claudication      COMPARISON:  None      TECHNIQUE: Sagittal T1, sagittal T2, sagittal inversion recovery, axial T1 and axial T2, coronal T2     IMAGE QUALITY:  Diagnostic     FINDINGS:     VERTEBRAL BODIES:  There are 5 lumbar type vertebral bodies  Minor focal dextroscoliosis of the mid to lower lumbar spine  No spondylolisthesis or spondylolysis  No compression fracture  Normal marrow signal is identified within the visualized bony   structures  No discrete marrow lesion      SACRUM:  Normal signal within the sacrum  No evidence of insufficiency or stress fracture      DISTAL CORD AND CONUS:  Normal size and signal within the distal cord and conus      PARASPINAL SOFT TISSUES:  Paraspinal soft tissues are unremarkable      LOWER THORACIC DISC SPACES:  Normal disc height and signal   No disc herniation, canal stenosis or foraminal narrowing      LUMBAR DISC SPACES:     L1-L2:  Normal      L2-L3:  There is small focal left subarticular and foraminal disc protrusion  No mass effect upon the thecal sac  No right-sided foraminal narrowing  There is mild left foraminal narrowing present      L3-L4:  Disc desiccation and loss of disc height  Mild diffuse annular bulging  Small left subarticular and foraminal disc protrusion  There is mild canal stenosis and mild bilateral foraminal narrowing      L4-L5:  Disc desiccation with slight loss of disc height  Mild diffuse annular bulging  There is a focal left foraminal disc extrusion superiorly displacing and compressing the exiting  Severe left foraminal narrowing  Mild right foraminal narrowing   is present as well      L5-S1:  Normal disc height and signal   Minor facet arthropathy    No canal stenosis or foraminal narrowing      IMPRESSION:     Severe left foraminal narrowing at the L4-5 level secondary to an extruded foraminal disc herniation, see series 3 images 3 and 4 where there is significant compression of the exiting nerve      Additional smaller disc herniations are noted at the L2-3, L3-4 levels with mild foraminal narrowing  No orders of the defined types were placed in this encounter

## 2022-04-14 ENCOUNTER — TELEPHONE (OUTPATIENT)
Dept: PAIN MEDICINE | Facility: CLINIC | Age: 47
End: 2022-04-14

## 2022-04-14 NOTE — TELEPHONE ENCOUNTER
Double book her for the 1 pm slot on 4/20/22 for now, but move her to a normal appointment slot if someone cancels on that day

## 2022-04-14 NOTE — TELEPHONE ENCOUNTER
Patient is requesting to speak to procedure  regarding obtaining authorization for her procedure she states Dr Yaneth Arteaga wants her to schedule next wk   Please reach out to her with this information at asap # [phone]

## 2022-04-15 NOTE — TELEPHONE ENCOUNTER
S/w pt and scheduled TFESI for 4/20/22 1 pm  Gave pre procedure instructions and /vaccine policy, also [facility]

## 2022-04-20 ENCOUNTER — HOSPITAL ENCOUNTER (OUTPATIENT)
Dept: RADIOLOGY | Facility: CLINIC | Age: 47
Discharge: HOME | End: 2022-04-20
Attending: ANESTHESIOLOGY | Admitting: ANESTHESIOLOGY
Payer: COMMERCIAL

## 2022-04-20 VITALS
OXYGEN SATURATION: 99 % | HEART RATE: 80 BPM | SYSTOLIC BLOOD PRESSURE: 101 MMHG | DIASTOLIC BLOOD PRESSURE: 68 MMHG | RESPIRATION RATE: 20 BRPM | TEMPERATURE: 97.1 F

## 2022-04-20 DIAGNOSIS — G89.29 CHRONIC LEFT-SIDED LOW BACK PAIN WITH LEFT-SIDED SCIATICA: ICD-10-CM

## 2022-04-20 DIAGNOSIS — M54.42 CHRONIC LEFT-SIDED LOW BACK PAIN WITH LEFT-SIDED SCIATICA: ICD-10-CM

## 2022-04-20 DIAGNOSIS — M51.16 LUMBAR DISC HERNIATION WITH RADICULOPATHY: ICD-10-CM

## 2022-04-20 DIAGNOSIS — G89.4 CHRONIC PAIN SYNDROME: ICD-10-CM

## 2022-04-20 PROCEDURE — 64484 NJX AA&/STRD TFRM EPI L/S EA: CPT | Performed by: ANESTHESIOLOGY

## 2022-04-20 PROCEDURE — 64483 NJX AA&/STRD TFRM EPI L/S 1: CPT | Performed by: ANESTHESIOLOGY

## 2022-04-20 RX ORDER — METHYLPREDNISOLONE ACETATE 80 MG/ML
80 INJECTION, SUSPENSION INTRA-ARTICULAR; INTRALESIONAL; INTRAMUSCULAR; PARENTERAL; SOFT TISSUE ONCE
Status: COMPLETED | OUTPATIENT
Start: 2022-04-20 | End: 2022-04-20

## 2022-04-20 RX ORDER — LIDOCAINE HYDROCHLORIDE 10 MG/ML
5 INJECTION, SOLUTION EPIDURAL; INFILTRATION; INTRACAUDAL; PERINEURAL ONCE
Status: DISCONTINUED | OUTPATIENT
Start: 2022-04-20 | End: 2022-04-24 | Stop reason: HOSPADM

## 2022-04-20 RX ORDER — BUPIVACAINE HCL/PF 2.5 MG/ML
5 VIAL (ML) INJECTION ONCE
Status: COMPLETED | OUTPATIENT
Start: 2022-04-20 | End: 2022-04-20

## 2022-04-20 RX ADMIN — IOHEXOL 2 ML: 300 INJECTION, SOLUTION INTRAVENOUS at 13:14

## 2022-04-20 RX ADMIN — METHYLPREDNISOLONE ACETATE 80 MG: 80 INJECTION, SUSPENSION INTRA-ARTICULAR; INTRALESIONAL; INTRAMUSCULAR; PARENTERAL; SOFT TISSUE at 13:14

## 2022-04-20 RX ADMIN — Medication 3 ML: at 13:14

## 2022-04-20 NOTE — DISCHARGE INSTR - LAB
Epidural Steroid Injection   WHAT YOU NEED TO KNOW:   An epidural steroid injection (RIOS) is a procedure to inject steroid medicine into the epidural space  The epidural space is between your spinal cord and vertebrae  Steroids reduce inflammation and fluid buildup in your spine that may be causing pain  You may be given pain medicine along with the steroids  ACTIVITY  Do not drive or operate machinery today  No strenuous activity today - bending, lifting, etc   You may resume normal activites starting tomorrow - start slowly and as tolerated  You may shower today, but no tub baths or hot tubs  You may have numbness for several hours from the local anesthetic  Please use caution and common sense, especially with weight-bearing activities  CARE OF THE INJECTION SITE  If you have soreness or pain, apply ice to the area today (20 minutes on/20 minutes off)  Starting tomorrow, you may use warm, moist heat or ice if needed  You may have an increase or change in your discomfort for 36-48 hours after your treatment  Apply ice and continue with any pain medication you have been prescribed  Notify the [facility] if you have any of the following: redness, drainage, swelling, headache, stiff neck or fever above 100°F     SPECIAL INSTRUCTIONS  Our office will contact you in approximately 7 days for a progress report  MEDICATIONS  Continue to take all routine medications  Our office may have instructed you to hold some medications  As no general anesthesia was used in today's procedure, you should not experience any side effects related to anesthesia  If you have a problem specifically related to your procedure, please call our office at [phone]  Problems not related to your procedure should be directed to your primary care physician

## 2022-04-20 NOTE — INTERVAL H&P NOTE
Update: (This section must be completed if the H&P was completed greater than 24 hrs to procedure or admission)    H&P reviewed  After examining the patient, I find no changed to the H&P since it had been written  Patient re-evaluated   Accept as history and physical     Arina Beckman MD/April 20, 2022/1:01 PM

## 2022-04-28 ENCOUNTER — TELEPHONE (OUTPATIENT)
Dept: PAIN MEDICINE | Facility: CLINIC | Age: 47
End: 2022-04-28

## 2022-04-28 NOTE — TELEPHONE ENCOUNTER
Patient is in a lot of pain having trouble walking calling to find out next step   9/10     Please advise on next step    Thank you

## 2022-04-29 NOTE — TELEPHONE ENCOUNTER
S/w pt  States minimal relief from 4/20 procedure  Pt asking if she can repeat injection  Advised AS out of office this week but will check next week  Pt only taking gabapentin bid  Advised she increase to tid as ordered  Advised topical lidocaine or patch  Advised tylenol 1000mg every 8 hours if tolerated    Will call pt next week for update

## 2022-05-03 NOTE — TELEPHONE ENCOUNTER
Pt called stating she is not getting any relief and would like to know her next steps    Pt can be reached at [phone]

## 2022-05-04 NOTE — TELEPHONE ENCOUNTER
S/W pt who states she had about 30% relief and feels more limber  She would like to repeat the injection instead of seeing surgeon at this point  Please place order

## 2022-05-04 NOTE — TELEPHONE ENCOUNTER
If she had any relief from the injection, I am willing to try it again  If not, I would suggest speaking to a surgeon to treat the extruded disc

## 2022-05-05 DIAGNOSIS — K21.9 GASTROESOPHAGEAL REFLUX DISEASE, UNSPECIFIED WHETHER ESOPHAGITIS PRESENT: ICD-10-CM

## 2022-05-05 DIAGNOSIS — R10.13 EPIGASTRIC PAIN: ICD-10-CM

## 2022-05-05 RX ORDER — OMEPRAZOLE 20 MG/1
CAPSULE, DELAYED RELEASE ORAL
Qty: 180 CAPSULE | Refills: 1 | Status: SHIPPED | OUTPATIENT
Start: 2022-05-05

## 2022-05-05 NOTE — TELEPHONE ENCOUNTER
S/w pt and scheduled TFESI for 5/18/22 9 am  Gave pre procedure instructions and /vaccine policy  Send thru [employer]

## 2022-05-18 ENCOUNTER — HOSPITAL ENCOUNTER (OUTPATIENT)
Dept: RADIOLOGY | Facility: CLINIC | Age: 47
Discharge: HOME | End: 2022-05-18
Attending: ANESTHESIOLOGY | Admitting: ANESTHESIOLOGY
Payer: COMMERCIAL

## 2022-05-18 VITALS
TEMPERATURE: 96.4 F | SYSTOLIC BLOOD PRESSURE: 119 MMHG | RESPIRATION RATE: 20 BRPM | OXYGEN SATURATION: 100 % | HEART RATE: 83 BPM | DIASTOLIC BLOOD PRESSURE: 77 MMHG

## 2022-05-18 DIAGNOSIS — M54.42 CHRONIC LEFT-SIDED LOW BACK PAIN WITH LEFT-SIDED SCIATICA: ICD-10-CM

## 2022-05-18 DIAGNOSIS — G89.4 CHRONIC PAIN SYNDROME: ICD-10-CM

## 2022-05-18 DIAGNOSIS — M51.16 LUMBAR DISC HERNIATION WITH RADICULOPATHY: ICD-10-CM

## 2022-05-18 DIAGNOSIS — G89.29 CHRONIC LEFT-SIDED LOW BACK PAIN WITH LEFT-SIDED SCIATICA: ICD-10-CM

## 2022-05-18 PROCEDURE — A9585 GADOBUTROL INJECTION: HCPCS | Performed by: ANESTHESIOLOGY

## 2022-05-18 PROCEDURE — 64483 NJX AA&/STRD TFRM EPI L/S 1: CPT | Performed by: ANESTHESIOLOGY

## 2022-05-18 PROCEDURE — 64484 NJX AA&/STRD TFRM EPI L/S EA: CPT | Performed by: ANESTHESIOLOGY

## 2022-05-18 RX ORDER — BUPIVACAINE HCL/PF 2.5 MG/ML
5 VIAL (ML) INJECTION ONCE
Status: COMPLETED | OUTPATIENT
Start: 2022-05-18 | End: 2022-05-18

## 2022-05-18 RX ORDER — METHYLPREDNISOLONE ACETATE 80 MG/ML
80 INJECTION, SUSPENSION INTRA-ARTICULAR; INTRALESIONAL; INTRAMUSCULAR; PARENTERAL; SOFT TISSUE ONCE
Status: COMPLETED | OUTPATIENT
Start: 2022-05-18 | End: 2022-05-18

## 2022-05-18 RX ORDER — LIDOCAINE HYDROCHLORIDE 10 MG/ML
5 INJECTION, SOLUTION EPIDURAL; INFILTRATION; INTRACAUDAL; PERINEURAL ONCE
Status: DISCONTINUED | OUTPATIENT
Start: 2022-05-18 | End: 2022-05-22 | Stop reason: HOSPADM

## 2022-05-18 RX ADMIN — Medication 3 ML: at 09:29

## 2022-05-18 RX ADMIN — GADOBUTROL 2 ML: 604.72 INJECTION INTRAVENOUS at 09:28

## 2022-05-18 RX ADMIN — METHYLPREDNISOLONE ACETATE 80 MG: 80 INJECTION, SUSPENSION INTRA-ARTICULAR; INTRALESIONAL; INTRAMUSCULAR; PARENTERAL; SOFT TISSUE at 09:29

## 2022-05-18 NOTE — DISCHARGE INSTR - LAB
Epidural Steroid Injection   WHAT YOU NEED TO KNOW:   An epidural steroid injection (RIOS) is a procedure to inject steroid medicine into the epidural space  The epidural space is between your spinal cord and vertebrae  Steroids reduce inflammation and fluid buildup in your spine that may be causing pain  You may be given pain medicine along with the steroids  ACTIVITY  Do not drive or operate machinery today  No strenuous activity today - bending, lifting, etc   You may resume normal activites starting tomorrow - start slowly and as tolerated  You may shower today, but no tub baths or hot tubs  You may have numbness for several hours from the local anesthetic  Please use caution and common sense, especially with weight-bearing activities  CARE OF THE INJECTION SITE  If you have soreness or pain, apply ice to the area today (20 minutes on/20 minutes off)  Starting tomorrow, you may use warm, moist heat or ice if needed  You may have an increase or change in your discomfort for 36-48 hours after your treatment  Apply ice and continue with any pain medication you have been prescribed  Notify the [facility] if you have any of the following: redness, drainage, swelling, headache, stiff neck or fever above 100°F     SPECIAL INSTRUCTIONS  Our office will contact you in approximately 7 days for a progress report  MEDICATIONS  Continue to take all routine medications  Our office may have instructed you to hold some medications  As no general anesthesia was used in today's procedure, you should not experience any side effects related to anesthesia  If you have a problem specifically related to your procedure, please call our office at [phone]  Problems not related to your procedure should be directed to your primary care physician

## 2022-05-18 NOTE — H&P
History of Present Illness: The patient is a 51 y o  female who presents with complaints of low back pain and left lower extremity radiculopathy      Patient Active Problem List   Diagnosis    Cervical radiculopathy    Cervical disc disorder with radiculopathy of mid-cervical region    Lumbar radiculopathy    Major depression, recurrent, chronic (HCC)    Anxiety    Mixed hyperlipidemia    Chronic pain syndrome    Displaced fracture of lateral end of right clavicle with routine healing       Past Medical History:   Diagnosis Date    Anxiety     Back injury     Cervical radiculopathy     Closed fracture of transverse process of thoracic vertebra ([facility] [address] ) 6/26/2021    Degenerative disc disease, cervical     pt states entire back    Depression     Head injury     hx of 4 concussions    High cholesterol     IBS (irritable bowel syndrome)     Lumbar radiculopathy     Memory loss     Migraine     Neck injury     Sciatic nerve pain, left        Past Surgical History:   Procedure Laterality Date    CYSTOSCOPY      EGD AND COLONOSCOPY  2015    SHOULDER SURGERY Right          Current Outpatient Medications:     acetaminophen (TYLENOL) 500 mg tablet, Take 1 tablet (500 mg total) by mouth every 4 (four) hours as needed for moderate pain (back paiin/initial rx ), Disp: 30 tablet, Rfl: 0    albuterol (Ventolin HFA) 90 mcg/act inhaler, 1-2 puffs inhaled every 4 to 6 hours as needed for wheezing or shortness of breath, Disp: 18 g, Rfl: 5    ALPRAZolam (XANAX) 2 MG tablet, Take by mouth 3 (three) times a day as needed for anxiety , Disp: , Rfl:     FLUoxetine (PROzac) 20 mg capsule, Take 40 mg by mouth daily  , Disp: , Rfl:     fluticasone (FLONASE) 50 mcg/act nasal spray, INSTILL 1 SPRAY IN EACH NOSTRIL EVERY DAY, Disp: 48 g, Rfl: 1    gabapentin (Neurontin) 300 mg capsule, Take 1 capsule (300 mg total) by mouth 3 (three) times a day, Disp: 90 capsule, Rfl: 0    ibuprofen (MOTRIN) 800 mg tablet, Take 1 tab by mouth every 8 hours as needed for pain (Patient not taking: Reported on 4/12/2022 ), Disp: 90 tablet, Rfl: 0    montelukast (SINGULAIR) 10 mg tablet, TAKE 1 TABLET BY MOUTH EVERY NIGHT AT BEDTIME, Disp: 90 tablet, Rfl: 1    omeprazole (PriLOSEC) 20 mg delayed release capsule, TAKE 2 CAPSULES BY MOUTH EVERY DAY, Disp: 180 capsule, Rfl: 1    Allergies   Allergen Reactions    Topamax [Topiramate] Headache     Severe HA and body aches      Methylprednisolone Other (See Comments) and Hyperactivity     insomnia    Cyclobenzaprine Anxiety    Naproxen Anxiety       Physical Exam:   Vitals:    05/18/22 0902   BP: 110/77   Pulse: 88   Resp: 20   Temp: (!) 96 4 °F (35 8 °C)   SpO2: 98%     General: Awake, Alert, Oriented x 3, Mood and affect appropriate  Respiratory: Respirations even and unlabored  Cardiovascular: Peripheral pulses intact; no edema  Musculoskeletal Exam:  Tenderness in lumbar spine region    ASA Score: 2    Patient/Chart Verification  Patient ID Verified: Verbal  ID Band Applied: No  Consents Confirmed: To be obtained in the Pre-Procedure area  H&P( within 30 days) Verified: To be obtained in the Pre-Procedure area  Interval H&P(within 24 hr) Complete (required for Outpatients and Surgery Admit only): To be obtained in the Pre-Procedure area  Allergies Reviewed: Yes  Anticoag/NSAID held?: NA  Currently on antibiotics?: No  Pregnancy denied?: NA    Assessment:   1  Chronic pain syndrome    2  Chronic left-sided low back pain with left-sided sciatica    3   Lumbar disc herniation with radiculopathy        Plan: Left L4 and L5 transforaminal epidural steroid injection

## 2022-05-20 ENCOUNTER — TELEPHONE (OUTPATIENT)
Dept: RADIOLOGY | Facility: CLINIC | Age: 47
End: 2022-05-20

## 2022-05-20 DIAGNOSIS — G89.4 CHRONIC PAIN SYNDROME: Primary | ICD-10-CM

## 2022-05-20 DIAGNOSIS — M54.16 LUMBAR RADICULOPATHY: Chronic | ICD-10-CM

## 2022-05-20 RX ORDER — TIZANIDINE 4 MG/1
4 TABLET ORAL 3 TIMES DAILY PRN
Qty: 90 TABLET | Refills: 0 | Status: SHIPPED | OUTPATIENT
Start: 2022-05-20

## 2022-05-20 NOTE — TELEPHONE ENCOUNTER
S/w pt  S/p L L4,L5 TFESI 5/18  Pt report "excruciating pain" since injection  Pt now states she has pain in left hip and groin as well as through leg  states leg is having very painful spasms since procedure  Pt tearful and worried she moved during the procedure  Advised pt new or worsening pain can happen post procedure  Advised steroid needs more time to work  Pt taking gabapentin and tylenol without relief  Advised pt try advil or aleve  Pt denies documented naproxen allergy at this time  Advised rest and ice or heat  Pt verbalized understanding  Can you order something for spasms?  Pt does not recall being on any muscle relaxers in the past

## 2022-05-25 ENCOUNTER — TELEPHONE (OUTPATIENT)
Dept: PAIN MEDICINE | Facility: CLINIC | Age: 47
End: 2022-05-25

## 2022-05-25 DIAGNOSIS — M54.16 LUMBAR RADICULOPATHY: Primary | ICD-10-CM

## 2022-06-01 NOTE — TELEPHONE ENCOUNTER
Pt states no pain relief   Pt states she is not taking any of the medications because she states that nothing is working     Pt is no doing PT too  She states she does   Not want to repeat procedure       6-10/10 pain level

## 2022-08-22 ENCOUNTER — OFFICE VISIT (OUTPATIENT)
Dept: OBGYN CLINIC | Facility: CLINIC | Age: 47
End: 2022-08-22
Payer: MEDICARE

## 2022-08-22 VITALS
SYSTOLIC BLOOD PRESSURE: 112 MMHG | DIASTOLIC BLOOD PRESSURE: 79 MMHG | HEART RATE: 102 BPM | HEIGHT: 62 IN | BODY MASS INDEX: 28.83 KG/M2

## 2022-08-22 DIAGNOSIS — M48.061 SPINAL STENOSIS OF LUMBAR REGION, UNSPECIFIED WHETHER NEUROGENIC CLAUDICATION PRESENT: ICD-10-CM

## 2022-08-22 DIAGNOSIS — G89.29 CHRONIC BILATERAL LOW BACK PAIN, UNSPECIFIED WHETHER SCIATICA PRESENT: Primary | ICD-10-CM

## 2022-08-22 DIAGNOSIS — M54.50 CHRONIC BILATERAL LOW BACK PAIN, UNSPECIFIED WHETHER SCIATICA PRESENT: Primary | ICD-10-CM

## 2022-08-22 DIAGNOSIS — M51.36 DDD (DEGENERATIVE DISC DISEASE), LUMBAR: ICD-10-CM

## 2022-08-22 DIAGNOSIS — M54.16 LUMBAR RADICULOPATHY: ICD-10-CM

## 2022-08-22 PROCEDURE — 99214 OFFICE O/P EST MOD 30 MIN: CPT | Performed by: ORTHOPAEDIC SURGERY
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
## 2022-08-29 DIAGNOSIS — R10.13 EPIGASTRIC PAIN: ICD-10-CM

## 2022-08-29 DIAGNOSIS — K21.9 GASTROESOPHAGEAL REFLUX DISEASE, UNSPECIFIED WHETHER ESOPHAGITIS PRESENT: ICD-10-CM

## 2022-08-29 RX ORDER — OMEPRAZOLE 20 MG/1
CAPSULE, DELAYED RELEASE ORAL
Qty: 180 CAPSULE | Refills: 1 | Status: SHIPPED | OUTPATIENT
Start: 2022-08-29

## 2022-09-06 ENCOUNTER — ANNUAL EXAM (OUTPATIENT)
Dept: OBGYN CLINIC | Age: 47
End: 2022-09-06
Payer: COMMERCIAL

## 2022-09-06 ENCOUNTER — APPOINTMENT (OUTPATIENT)
Dept: LAB | Facility: CLINIC | Age: 47
End: 2022-09-06
Payer: COMMERCIAL

## 2022-09-06 VITALS
WEIGHT: 154 LBS | BODY MASS INDEX: 28.34 KG/M2 | SYSTOLIC BLOOD PRESSURE: 112 MMHG | HEIGHT: 62 IN | DIASTOLIC BLOOD PRESSURE: 84 MMHG

## 2022-09-06 DIAGNOSIS — Z01.411 ENCOUNTER FOR GYNECOLOGICAL EXAMINATION WITH ABNORMAL FINDING: Primary | ICD-10-CM

## 2022-09-06 DIAGNOSIS — N95.1 PERIMENOPAUSAL SYMPTOMS: ICD-10-CM

## 2022-09-06 DIAGNOSIS — Z12.31 SCREENING MAMMOGRAM FOR BREAST CANCER: ICD-10-CM

## 2022-09-06 DIAGNOSIS — N92.6 IRREGULAR MENSES: ICD-10-CM

## 2022-09-06 PROBLEM — M54.12 CERVICAL RADICULOPATHY: Chronic | Status: RESOLVED | Noted: 2019-11-13 | Resolved: 2022-09-06

## 2022-09-06 PROCEDURE — 84443 ASSAY THYROID STIM HORMONE: CPT

## 2022-09-06 PROCEDURE — 36415 COLL VENOUS BLD VENIPUNCTURE: CPT

## 2022-09-06 PROCEDURE — 83001 ASSAY OF GONADOTROPIN (FSH): CPT

## 2022-09-06 PROCEDURE — 99396 PREV VISIT EST AGE 40-64: CPT | Performed by: NURSE PRACTITIONER

## 2022-09-06 PROCEDURE — 0503F POSTPARTUM CARE VISIT: CPT | Performed by: NURSE PRACTITIONER

## 2022-09-06 PROCEDURE — 82670 ASSAY OF TOTAL ESTRADIOL: CPT

## 2022-09-06 RX ORDER — FLUOXETINE HYDROCHLORIDE 40 MG/1
40 CAPSULE ORAL EVERY MORNING
COMMUNITY
Start: 2022-08-24

## 2022-09-06 RX ORDER — ALPRAZOLAM 1 MG/1
1 TABLET ORAL 4 TIMES DAILY
COMMUNITY
Start: 2022-08-25

## 2022-09-06 NOTE — PROGRESS NOTES
Problem List Items Addressed This Visit    None     Visit Diagnoses     Encounter for gynecological examination with abnormal finding    -  Primary    Irregular menses        Relevant Orders    TSH, 3rd generation with Free T4 reflex    Follicle stimulating hormone    Estradiol    Perimenopausal symptoms        Screening mammogram for breast cancer        Relevant Orders    Mammo screening bilateral w 3d & cad            Calcium/vit d inclusion in the diet discussed, call with any issues, SBE reinforced, all concerns addressed  Will continue menses calendar and see me in 6 months for re-evaluation  Pleasant 51 y o  possibly perimenopausal female here for annual exam  She denies any issues with heavy bleeding or her menses  She reports irregular cycles for the past year  Three months is the longest she has skipped  Denies history of abnormal pap smears  Last Pap and HPV tests were done on 03/05/2019 neg/neg, no pap done today  She denies vaginal issues  She denies pelvic pain  She denies dyspareunia  She is sexually active without any concerns  Last mammogram was done on 02/22/2019, given slip for a repeat  +Smoker 4-8 per day  Colonoscopy due again in 2026   for 13 years      Past Medical History:   Diagnosis Date    Anxiety     Back injury     Cervical radiculopathy     Closed fracture of transverse process of thoracic vertebra ([address] ) 6/26/2021    Degenerative disc disease, cervical     pt states entire back    Depression     Head injury     hx of 4 concussions    High cholesterol     IBS (irritable bowel syndrome)     Lumbar radiculopathy     Memory loss     Migraine     Neck injury     Sciatic nerve pain, left      Past Surgical History:   Procedure Laterality Date    CYSTOSCOPY      EGD AND COLONOSCOPY  2015    SHOULDER SURGERY Right      Family History   Problem Relation Age of Onset    ALS Mother     Multiple sclerosis Father     Multiple sclerosis Sister     No Known Problems Brother     No Known Problems Sister     No Known Problems Brother     Breast cancer Maternal Aunt     Breast cancer Paternal Aunt      Social History     Tobacco Use    Smoking status: Current Some Day Smoker     Packs/day: 0 25     Years: 15 00     Pack years: 3 75     Types: Cigarettes    Smokeless tobacco: Never Used   Vaping Use    Vaping Use: Never used   Substance Use Topics    Alcohol use: Not Currently     Alcohol/week: 1 0 standard drink     Types: 1 Glasses of wine per week    Drug use: Yes     Types: Marijuana       Current Outpatient Medications:     acetaminophen (TYLENOL) 500 mg tablet, Take 1 tablet (500 mg total) by mouth every 4 (four) hours as needed for moderate pain (back paiin/initial rx ), Disp: 30 tablet, Rfl: 0    albuterol (Ventolin HFA) 90 mcg/act inhaler, 1-2 puffs inhaled every 4 to 6 hours as needed for wheezing or shortness of breath, Disp: 18 g, Rfl: 5    ALPRAZolam (XANAX) 1 mg tablet, Take 1 mg by mouth 4 (four) times a day, Disp: , Rfl:     FLUoxetine (PROzac) 40 MG capsule, Take 40 mg by mouth every morning, Disp: , Rfl:     fluticasone (FLONASE) 50 mcg/act nasal spray, INSTILL 1 SPRAY IN EACH NOSTRIL EVERY DAY, Disp: 48 g, Rfl: 1    ibuprofen (MOTRIN) 800 mg tablet, Take 1 tab by mouth every 8 hours as needed for pain, Disp: 90 tablet, Rfl: 0    montelukast (SINGULAIR) 10 mg tablet, TAKE 1 TABLET BY MOUTH EVERY NIGHT AT BEDTIME, Disp: 90 tablet, Rfl: 1    omeprazole (PriLOSEC) 20 mg delayed release capsule, TAKE 2 CAPSULES BY MOUTH EVERY DAY, Disp: 180 capsule, Rfl: 1  Patient Active Problem List    Diagnosis Date Noted    Displaced fracture of lateral end of right clavicle with routine healing 07/10/2021    Chronic pain syndrome 03/09/2020    Lumbar radiculopathy 11/25/2019    Major depression, recurrent, chronic (HCC) 11/25/2019    Anxiety 11/25/2019    Mixed hyperlipidemia 11/25/2019    Cervical disc disorder with radiculopathy of mid-cervical region 2019       Allergies   Allergen Reactions    Topamax [Topiramate] Headache     Severe HA and body aches      Methylprednisolone Other (See Comments) and Hyperactivity     insomnia    Cyclobenzaprine Anxiety    Naproxen Anxiety       OB History    Para Term  AB Living   2 0 0 0 2 0   SAB IAB Ectopic Multiple Live Births   2 0 0 0 0      # Outcome Date GA Lbr [place]/2nd Weight Sex Delivery Anes PTL Lv   2 SAB            1 SAB              Originally from [place]  Unemployed    Vitals:    22 1422   BP: 112/84   Weight: 69 9 kg (154 lb)   Height: 5' 2" (1 575 m)     Body mass index is 28 17 kg/m²  Patient's last menstrual period was 2022 (exact date)  Review of Systems   Constitutional: Negative for chills, fatigue, fever and unexpected weight change  Respiratory: Negative for shortness of breath  Gastrointestinal: Negative for anal bleeding, blood in stool, +IBS  Genitourinary: Negative for difficulty urinating, dysuria  Hx of hematuria  Physical Exam   Constitutional: She appears well-developed and well-nourished  No distress  HENT: atraumatic  Head: Normocephalic  Neck: Normal range of motion  Neck supple  Pulmonary: Effort normal   Breasts: bilateral without masses, skin changes or nipple discharge  Bilaterally soft and warm to touch  No areas of erythema or pain  Abdominal: Soft  Pelvic exam was performed with patient supine  No labial fusion  There is no rash, tenderness, lesion or injury on the right labia  There is no rash, tenderness, lesion or injury on the left labia  Urethral meatus does not show any tenderness, inflammation or discharge  Palpation of midline bladder without pain or discomfort  Uterus is not deviated, not enlarged, not fixed and not tender  Cervix exhibits no motion tenderness, no discharge and no friability  Right adnexum displays no mass, no tenderness and no fullness  Left adnexum displays no mass, no tenderness and no fullness   No erythema or tenderness in the vagina  No foreign body in the vagina  No signs of injury around the vagina  No vaginal discharge found  No signs of injury around the vagina or anus  Perineum without lesions, signs of injury, erythema or swelling  +Hemorrhoids  Lymphadenopathy:        Right: No inguinal adenopathy present  Left: No inguinal adenopathy present

## 2022-09-06 NOTE — PROGRESS NOTES
Patient presents for: yearly  Menarche- 12  Last Pap Smear- 03/05/2019  Hx of abnormal paps-never  Birth control-    Mammogram- 02/22/2019  DXA-not on file   Colonoscopy- 02/17/21    Smoking status-current   Last sexual activity- yes  Family history of uterine, ovarian, cervical or breast cancer-  Maternal and paternal aunt breast cancer   Concerns- this past year pt period started to get irregular periods alternate from heavy to light flow

## 2022-09-06 NOTE — PATIENT INSTRUCTIONS
Menopause   WHAT YOU NEED TO KNOW:   What is menopause? Menopause is a normal stage in a woman's life when her monthly periods stop  You are considered to be in menopause when you have not had a period for a full year after the age of 36  Menopause usually occurs between ages 52 to 48  Perimenopause is a stage before menopause that may cause signs and symptoms similar to menopause  Perimenopause may start about 4 years before menopause  What causes menopause? Menopause starts when the ovaries stop making the female hormones estrogen and progesterone  After menopause, you are no longer able to become pregnant  Any of the following may trigger menopause or early menopause:  Surgery, including a hysterectomy or oophorectomy    Family history of early menopause    Smoking    Chemotherapy or pelvic radiation    Chromosome abnormalities, including Montalvo syndrome and Fragile X syndrome    Premature ovarian insufficiency (the ovaries stop producing eggs before age 36)    What are the signs and symptoms of menopause? You may have any of the following:  Irregular menstrual cycles with heavy vaginal bleeding followed by decreased bleeding until it stops    Hot flashes (feeling warm, flushed, and sweaty)    Vaginal changes such as increased dryness    Mood changes such as anxiety, depression, or decreased desire to have sex    Trouble sleeping, joint pain, headaches    Brittle nails, hair on chin or chest where it is normally absent    Decrease in breast size and change in skin texture    Weight gain    How is menopause treated or managed? Hormone replacement therapy (HRT) is medicine that replaces your low hormone levels  HRT contains estrogen and sometimes progestin  HRT has several benefits  HRT helps prevent osteoporosis, which decreases your risk for bone fractures  HRT also protects you from colorectal cancer  HRT also has some risks    HRT increases your risk for breast cancer, blood clots, heart disease, a heart attack, or a stroke  If you are 72 years or older, HRT can also increase your risk for dementia  Your risk for uterine or endometrial cancer is higher if you take estrogen-only HRT  Manage hot flashes  Hot flashes are brief periods of feeling very warm, flushed, and sweaty  Hot flashes can last from a few seconds to several minutes  They may happen many times during the day, and are common at night  Layer your clothing so that you can easily remove some clothing and cool yourself during a hot flash  Cold drinks may also be helpful  Non-hormone medicines can help relieve or prevent hot flashes  Examples include certain antidepressants, nerve medicines, and high blood pressure medicines  Reduce vaginal dryness by using over-the-counter vaginal creams  Vaginal dryness may cause you to have pain or discomfort during sex  Only use creams that are made for vaginal use  Do  not  use petroleum jelly  You may put an estrogen cream in and around your vagina  Estrogen cream may help decrease vaginal dryness and lower your risk of vaginal infections  Continue to use birth control during perimenopause if you do not want to get pregnant  You may need to use birth control until it has been 1 year since your periods stopped  Ask your healthcare provider when you can stop using birth control to prevent pregnancy  How can I live a healthy lifestyle during and after menopause? After menopause, your risk for heart disease and bone loss increases  Ask about these and other ways to stay healthy:  Exercise regularly  Exercise helps you maintain a healthy weight  Exercise can also help to control your blood pressure and cholesterol levels  Include weight-bearing exercise for strong bones  Weight bearing exercise is recommended for at least 30 minutes, 3 times a week  Ask your healthcare provider about the best exercise plan for you  Eat a variety of healthy foods    Include fruits, vegetables, whole grains (whole-wheat bread, pasta, and cereals), low-fat dairy, and lean protein foods (beans, poultry, and fish)  Limit foods high in sodium (salt)  Ask your healthcare provider for more information about a meal plan that is right for you  Do not smoke  If you smoke, it is never too late to quit  You are more likely to have a heart attack, lung disease, blood clots, and cancer if you smoke  Ask your healthcare provider for information if you need help quitting  Take supplements as directed  You may need extra calcium and vitamin D to help prevent osteoporosis  Limit alcohol and caffeine  Alcohol and caffeine may worsen your symptoms  When should I call my doctor? You have vaginal bleeding after menopause  You have questions or concerns about your condition or care  CARE AGREEMENT:   You have the right to help plan your care  Learn about your health condition and how it may be treated  Discuss treatment options with your healthcare providers to decide what care you want to receive  You always have the right to refuse treatment  The above information is an  only  It is not intended as medical advice for individual conditions or treatments  Talk to your doctor, nurse or pharmacist before following any medical regimen to see if it is safe and effective for you  © Copyright [employer] 2022 Information is for End User's use only and may not be sold, redistributed or otherwise used for commercial purposes  All illustrations and images included in CareNotes® are the copyrighted property of A D A M , Inc  or [facility] Johnathan Galvin   Breast Self Exam for Women   AMBULATORY CARE:   A breast self-exam (BSE)  is a way to check your breasts for lumps and other changes  Regular BSEs can help you know how your breasts normally look and feel  Most breast lumps or changes are not cancer, but you should always have them checked by a healthcare provider    Why you should do a BSE: Breast cancer is the most common type of cancer in women  Even if you have mammograms, you may still want to do a BSE regularly  If you know how your breasts normally feel and look, it may help you know when to contact your healthcare provider  Mammograms can miss some cancers  You may find a lump during a BSE that did not show up on a mammogram   When you should do a BSE:  If you have periods, you may want to do your BSE 1 week after your period ends  This is the time when your breasts may be the least swollen, lumpy, or tender  You can do regular BSEs even if you are breastfeeding or have breast implants  Call your doctor if:   You find any lumps or changes in your breasts  You have breast pain or fluid coming from your nipples  You have questions or concerns about your condition or care  How to do a BSE:       Look at your breasts in a mirror  Look at the size and shape of each breast and nipple  Check for swelling, lumps, dimpling, scaly skin, or other skin changes  Look for nipple changes, such as a nipple that is painful or beginning to pull inward  Gently squeeze both nipples and check to see if fluid (that is not breast milk) comes out of them  If you find any of these or other breast changes, contact your healthcare provider  Check your breasts while you sit or  the following 3 positions:    [place] your arms down at your sides  Raise your hands and join them behind your head  Put firm pressure with your hands on your hips  Bend slightly forward while you look at your breasts in the mirror  Lie down and feel your breasts  When you lie down, your breast tissue spreads out evenly over your chest  This makes it easier for you to feel for lumps and anything that may not be normal for your breasts  Do a BSE on one breast at a time  Place a small pillow or towel under your left shoulder  Put your left arm behind your head  Use the 3 middle fingers of your right hand    Use your fingertip pads, on the top of your fingers  Your fingertip pad is the most sensitive part of your finger  Use small circles to feel your breast tissue  Use your fingertip pads to make dime-sized, overlapping circles on your breast and armpits  Use light, medium, and firm pressure  First, press lightly  Second, press with medium pressure to feel a little deeper into the breast  Last, use firm pressure to feel deep within your breast     Examine your entire breast area  Examine the breast area from above the breast to below the breast where you feel only ribs  Make small circles with your fingertips, starting in the middle of your armpit  Make circles going up and down the breast area  Continue toward your breast and all the way across it  Examine the area from your armpit all the way over to the middle of your chest (breastbone)  Stop at the middle of your chest     Move the pillow or towel to your right shoulder, and put your right arm behind your head  Use the 3 fingertip pads of your left hand, and repeat the above steps to do a BSE on your right breast     What else you can do to check for breast problems or cancer:  Talk to your healthcare provider about mammograms  A mammogram is an x-ray of your breasts to screen for breast cancer or other problems  Your provider can tell you the benefits and risks of mammograms  The first mammogram is usually at age 39 or 48  Your provider may recommend you start at 36 or younger if your risk for breast cancer is high  Mammograms usually continue every 1 to 2 years until age 76  Follow up with your doctor as directed:  Write down your questions so you remember to ask them during your visits  © Copyright [employer] 2022 Information is for End User's use only and may not be sold, redistributed or otherwise used for commercial purposes   All illustrations and images included in CareNotes® are the copyrighted property of [employer]  or Beti Wilder above information is an  only  It is not intended as medical advice for individual conditions or treatments  Talk to your doctor, nurse or pharmacist before following any medical regimen to see if it is safe and effective for you

## 2022-09-07 LAB
ESTRADIOL SERPL-MCNC: 50 PG/ML
FSH SERPL-ACNC: 54.5 MIU/ML
TSH SERPL DL<=0.05 MIU/L-ACNC: 0.85 UIU/ML (ref 0.45–4.5)

## 2022-09-15 ENCOUNTER — TELEPHONE (OUTPATIENT)
Dept: OBGYN CLINIC | Facility: CLINIC | Age: 47
End: 2022-09-15

## 2022-09-15 NOTE — TELEPHONE ENCOUNTER
----- Message from [address] sent at 9/15/2022 11:27 AM EDT -----  Regarding: Question regarding TSH 3RD GENERATION WITH T4 REFLEX  Wondering if I need to be on hormone & estrogen supplement or treatment?

## 2022-09-19 NOTE — TELEPHONE ENCOUNTER
Please advise patient if she is interested in seeing Dr Zan Draper for menopausal symptoms or hormone replacement therapy we can provide a referral  Unless she is having symptoms, I would not recommend hormone replacement  She is a smoker

## 2022-09-24 ENCOUNTER — NURSE TRIAGE (OUTPATIENT)
Dept: OTHER | Facility: OTHER | Age: 47
End: 2022-09-24

## 2022-09-24 NOTE — TELEPHONE ENCOUNTER
Reason for Disposition   MILD difficulty breathing (e g , minimal/no SOB at rest, SOB with walking, pulse <100)   Patient sounds very sick or weak to the triager    Protocols used: CORONAVIRUS (COVID-19) DIAGNOSED OR SUSPECTED-ADULT-

## 2022-09-24 NOTE — TELEPHONE ENCOUNTER
Regarding: Covid positive body aches chest pain fever vomiting chills migraine itchy eyes cough  ----- Message from Chris  sent at 9/24/2022  1:28 PM EDT -----  "Friday I tested positive for covid  My symptoms started late Wednesday  I have body aches, my chest hurts, fever, vomiting, chills, migraine, itchy swollen eyes, and a cough   I was told about an antiviral pill "

## 2022-09-24 NOTE — TELEPHONE ENCOUNTER
Answer Assessment - Initial Assessment Questions  1  COVID-19 DIAGNOSIS: "Who made your COVID-19 diagnosis?" "Was it confirmed by a positive lab test or self-test?" If not diagnosed by a doctor (or NP/PA), ask "Are there lots of cases (community spread) where you live?" Note: See [facility] health department website, if unsure  Covid symptoms covid positive  Symptoms started Thursday 9/22/22   Wax wanes is SOB and chest tightness, headache   Patient is very anxious very anxious  She is unvaccinated and hard to get straight answers from  2  COVID-19 EXPOSURE: "Was there any known exposure to COVID before the symptoms began?" [facility] Definition of close contact: within 6 feet (2 meters) for a total of 15 minutes or more over a 24-hour period  Tested covid positive       3  ONSET: "When did the COVID-19 symptoms start?"        9/22/22    4  WORST SYMPTOM: "What is your worst symptom?" (e g , cough, fever, shortness of breath, muscle aches)       Coughing a lot     5  COUGH: "Do you have a cough?" If Yes, ask: "How bad is the cough?"         Yes     6  FEVER: "Do you have a fever?" If Yes, ask: "What is your temperature, how was it measured, and when did it start?"     Yes 101 5 today  7  RESPIRATORY STATUS: "Describe your breathing?" (e g , shortness of breath, wheezing, unable to speak)      SOB     8  BETTER-SAME-WORSE: "Are you getting better, staying the same or getting worse compared to yesterday?"  If getting worse, ask, "In what way?"      *No Answer*  9  HIGH RISK DISEASE: "Do you have any chronic medical problems?" (e g , asthma, heart or lung disease, weak immune system, obesity, etc )      *No Answer*  10  VACCINE: "Have you had the COVID-19 vaccine?" If Yes, ask: "Which one, how many shots, when did you get it?"        *No Answer*  11  BOOSTER: "Have you received your COVID-19 booster?" If Yes, ask: "Which one and when did you get it?"        *No Answer*  12   PREGNANCY: "Is there any chance you are pregnant?" "When was your last menstrual period?"        *No Answer*  13  OTHER SYMPTOMS: "Do you have any other symptoms?"  (e g , chills, fatigue, headache, loss of smell or taste, muscle pain, sore throat)        *No Answer*  14  O2 SATURATION MONITOR:  "Do you use an oxygen saturation monitor (pulse oximeter) at home?" If Yes, ask "What is your reading (oxygen level) today?" "What is your usual oxygen saturation reading?" (e g , 95%)        *No Answer*    Protocols used: CORONAVIRUS (COVID-19) DIAGNOSED OR SUSPECTED-ADULT-

## 2022-09-24 NOTE — TELEPHONE ENCOUNTER
Patient is Covid positive with fever ,vomiting , body aches, diarrhea, headache, cough  Positive for SOB and chest tightness mild  Patient refuses to go to Er fo r evaluation requesting to discuss antiviral therapy  Patient Teo Molina  1975   She calls unvaccinated and Covid positive  She is 49years old symptoms include Fever, vomiting , diarrhea, Some SOB and chest tightness, cough, weakness  Patient ask about the antiviral treatment - paxlovid  She wants to know if you think its okay she wait another day to see how she is tomm? Would you send in paxlovid for a patient with symptoms likes this ? what do you recommend? As far as medical hx nothing impressive except allergies  TT sent to on call  Per on call she sent in Paxlovid to patients pharmacy and patient should begin taking  I tried to reach the patient a few times to let her know what the PCP said and sent paxlovid to her pharmacy but no answer  I recommended patient be evaluated for worsening symptoms and call back with further questions

## 2023-03-27 ENCOUNTER — TELEPHONE (OUTPATIENT)
Dept: INTERNAL MEDICINE CLINIC | Facility: CLINIC | Age: 48
End: 2023-03-27

## 2023-03-27 DIAGNOSIS — F33.9 MAJOR DEPRESSION, RECURRENT, CHRONIC (HCC): Primary | Chronic | ICD-10-CM

## 2023-03-27 RX ORDER — FLUOXETINE HYDROCHLORIDE 40 MG/1
40 CAPSULE ORAL EVERY MORNING
Qty: 90 CAPSULE | Refills: 1 | Status: SHIPPED | OUTPATIENT
Start: 2023-03-27

## 2023-03-27 NOTE — TELEPHONE ENCOUNTER
Patient needs a new referral / recommrned for a  psychiatrist, [facility] doesn't accept her insurance any more       Also requesting a refill for Xanax and prozac till she gets a new doctor

## 2023-03-27 NOTE — TELEPHONE ENCOUNTER
Psychiatrist office has the responsibility of getting her through until she can find a new prescriber

## 2023-03-27 NOTE — TELEPHONE ENCOUNTER
I can't take over the xanax prescription  She's on too high of a dose and I don't like to prescribe xanax      I put in referral to [facility]'s psychiatry but the wait list is long

## 2023-03-27 NOTE — TELEPHONE ENCOUNTER
called pt  And was notified and she is trying to find new dr  And has call into her insurance company  To see who is covered and asking if you can help her out with the xanax or something else till she can find new dr  As she does not want to end up in hospital when she does not have her meds

## 2023-04-03 ENCOUNTER — TELEPHONE (OUTPATIENT)
Dept: PSYCHIATRY | Facility: CLINIC | Age: 48
End: 2023-04-03

## 2023-04-03 NOTE — TELEPHONE ENCOUNTER
Patient has been added to the Non-referralMedication Management and Talk Therapy wait list     Confirmed Insurance: Yes [x] [employer]  Location Preference: [facility]  Provider Preference: none  Virtual: Yes [] No []

## 2023-04-13 ENCOUNTER — OFFICE VISIT (OUTPATIENT)
Age: 48
End: 2023-04-13

## 2023-04-13 VITALS
OXYGEN SATURATION: 98 % | WEIGHT: 144.8 LBS | HEART RATE: 84 BPM | DIASTOLIC BLOOD PRESSURE: 80 MMHG | HEIGHT: 62 IN | RESPIRATION RATE: 16 BRPM | BODY MASS INDEX: 26.65 KG/M2 | TEMPERATURE: 97.6 F | SYSTOLIC BLOOD PRESSURE: 128 MMHG

## 2023-04-13 DIAGNOSIS — Z13.29 SCREENING FOR THYROID DISORDER: ICD-10-CM

## 2023-04-13 DIAGNOSIS — E55.9 VITAMIN D DEFICIENCY: ICD-10-CM

## 2023-04-13 DIAGNOSIS — Z13.6 ENCOUNTER FOR LIPID SCREENING FOR CARDIOVASCULAR DISEASE: ICD-10-CM

## 2023-04-13 DIAGNOSIS — F33.9 MAJOR DEPRESSION, RECURRENT, CHRONIC (HCC): ICD-10-CM

## 2023-04-13 DIAGNOSIS — Z13.1 SCREENING FOR DIABETES MELLITUS: ICD-10-CM

## 2023-04-13 DIAGNOSIS — Z13.220 ENCOUNTER FOR LIPID SCREENING FOR CARDIOVASCULAR DISEASE: ICD-10-CM

## 2023-04-13 DIAGNOSIS — Z00.00 HEALTHCARE MAINTENANCE: ICD-10-CM

## 2023-04-13 DIAGNOSIS — F41.9 ANXIETY: Primary | Chronic | ICD-10-CM

## 2023-04-13 DIAGNOSIS — F13.20 BENZODIAZEPINE DEPENDENCE (HCC): ICD-10-CM

## 2023-04-13 RX ORDER — ALPRAZOLAM 1 MG/1
TABLET ORAL
Qty: 75 TABLET | Refills: 0 | Status: SHIPPED | OUTPATIENT
Start: 2023-04-13

## 2023-04-13 RX ORDER — MELATONIN 10 MG
10 CAPSULE ORAL
COMMUNITY

## 2023-04-13 NOTE — PATIENT INSTRUCTIONS
[address]    [phone]      Decrease Xanax 1mg am; 0 5mg noon, 0 5mg dinner, 1mg bedtime        Benzodiazepine Use Disorder   AMBULATORY CARE:   Benzodiazepine use disorder (BUD)  is a medical condition that develops from long-term misuse of a benzodiazepine  You are not able to stop even though the misuse causes physical or social problems  BUD is also called benzodiazepine abuse  Signs and symptoms of BUD  include at least 2 of the following in a 12-month period: You take the benzodiazepine in a way it was not intended  This is also called misuse  For example, your prescription is for anxiety relief, but you take it to feel good instead  You take more than prescribed or for longer than recommended  Misuse can also mean you take the benzodiazepine even though you do not have a prescription for it  You have a strong urge or craving for the benzodiazepine  This is also called addiction  You are not able to control when you take it or how much you take  You spend large amounts of time trying to get, take, or recover from the benzodiazepine  In between doses, you think about when you will get to take it again  You become dependent on the benzodiazepine  Dependence means your body becomes used to the benzodiazepine  You have withdrawal symptoms when you do not take the medicine for a short amount of time  You have to take it to stop or prevent withdrawal symptoms, such as shaky hands  You become tolerant to the benzodiazepine  This means the amount you have been taking no longer has the effects you want  You need higher amounts to feel the effects  You are not able to decrease or stop taking the benzodiazepine  You start again when you try to quit or take a lower amount  You keep taking the benzodiazepine even though it causes problems or is dangerous  For example, you drive even though the benzodiazepine makes you drowsy   You try to make the effect stronger by drinking alcohol or taking other drugs with it  You have problems at school, work, or home  You spend less time doing important or enjoyable activities  Call your local emergency number (911 in the [address]) if:   You have chest pain or breathing problems  You feel like hurting or killing yourself, or someone else  You have a seizure  Seek care immediately if:   Your speech is slurred  You are too weak to stand up  You have eye movements that you cannot control  Call your doctor or therapist if:   You are more nervous or anxious than before you took benzodiazepines  You become irritable or unhappy for no reason  You have questions or concerns about your condition or care  Treatment for BUD  may be offered in a hospital, outpatient facility, or drug rehabilitation center  Healthcare providers can help you make decisions about treatment programs  The goal is to help you decrease or stop taking the benzodiazepine  A detox program  includes medicine and treatment to reduce withdrawal symptoms and anxiety when you stop taking benzodiazepines  You will be in the hospital with close monitoring and care  Your dose will be gradually decreased  by your healthcare provider to help prevent withdrawal symptoms  Cognitive behavioral therapy (CBT)  can help you manage depression and anxiety caused by BUD  CBT can be done with you and a talk therapist or in a group with others  Motivational enhancement therapy  can help you set and reach healthy, positive goals  Twelve-step facilitation (TSF)  is a short, structured approach to reach early recovery  It is done one-to-one with a therapist in 12 to 15 sessions  What you need to know about benzodiazepine safety:   Do not mix benzodiazepines with medicines, drugs, or alcohol  The combination can cause an overdose, or cause you to stop breathing  Learn about the signs of an overdose so you know how to respond    Tell others about these signs so they will know what to do if needed  Keep benzodiazepines out of the reach of children  Store them in a locked cabinet or in a location that children cannot get to  Follow up with your doctor or therapist as directed:  Write down your questions so you remember to ask them during your visits  For support and more information:   Substance Abuse and [address] , [address]  Web Address: https://[employer]/    [facility] on Drug Abuse  [address] [place] [id number]  Phone: 3- 822 - 104-4388  Web Address: www patience nih gov  © Copyright [employer] 2022 Information is for End User's use only and may not be sold, redistributed or otherwise used for commercial purposes  The above information is an  only  It is not intended as medical advice for individual conditions or treatments  Talk to your doctor, nurse or pharmacist before following any medical regimen to see if it is safe and effective for you

## 2023-04-13 NOTE — PROGRESS NOTES
[facility] - [facility] PRIMARY CARE [place]    NAME: Dyan Beltran  AGE: 49 y o  SEX: female  : 1975     DATE: 2023     Assessment and Plan:     Problem List Items Addressed This Visit        Other    Major depression, recurrent, chronic (HCC) (Chronic)     Patient with long history of anxiety and depression on long-term high-dose benzodiazepines  Psychiatrist recently discontinued seeing her secondary to insurance  Patient verbalizes she would like to continue weaning off her benzodiazepine  We will continue Xanax 1 mg in the morning 0 5 mg mid afternoon 0 5 mg late afternoon 1 mg at bedtime  She is on a waiting list for psychiatry  Continue Prozac 40 mg daily  PHQ 2 was positive however PHQ-9 scale was noted not to be fully completed after patient's appointment  Negative for SI we will follow-up with PHQ-9 scale at upcoming appointment as well as KELY-7 score  Patient also given number and address for walk-in clinic in [facility] pass  Will return in 2 to 3 weeks for reevaluation  Relevant Medications    ALPRAZolam (XANAX) 1 mg tablet    Anxiety - Primary (Chronic)     Patient with long history of anxiety on long-term high-dose benzodiazepines  Psychiatrist recently discontinued seeing her secondary to insurance  Patient verbalizes she would like to continue weaning off her benzodiazepine  We will continue Xanax 1 mg in the morning 0 5 mg mid afternoon 0 5 mg late afternoon 1 mg at bedtime  She is on a waiting list for psychiatry  Continue Prozac 40 mg daily  PHQ 2 was positive however PHQ-9 scale was noted not to be fully completed after patient's appointment  We will follow-up with PHQ-9 scale at upcoming appointment as well as KELY-7 score  Patient also given number for walk-in clinic in [facility] pass  Will return in 2 to 3 weeks for reevaluation           Relevant Medications    ALPRAZolam (XANAX) 1 mg tablet    Other Relevant Orders    TSH, 3rd generation with Free T4 reflex (Completed)    Benzodiazepine dependence ([facility] [address] )     History of anxiety with high-dose benzodiazepine use by previous psychiatrist   Patient reports that at one time she was on Xanax 2 mg 4 times daily  Last dosing of Xanax prescribed to her was 1 mg 4 times daily  She had her last pill last night  I spoke to her at length that benzodiazepines not have a medication and management of anxiety she does verbalize awareness and understanding of this is agreeable to continue weaning  Will order Xanax 1 mg in the morning, 0 5 mg late morning, 0 5 mg late afternoon, and 1 mg at bedtime as needed  We will follow-up in the next couple weeks  Patient is on a waiting list for a new psychiatrist          Relevant Medications    ALPRAZolam (XANAX) 1 mg tablet    Other Relevant Orders    Toxicology screen, urine (Completed)   Other Visit Diagnoses     Screening for diabetes mellitus        Relevant Orders    Hemoglobin A1C (Completed)    Healthcare maintenance        Relevant Orders    CBC and differential (Completed)    Comprehensive metabolic panel (Completed)    UA w Reflex to Microscopic w Reflex to Culture -Lab Collect (Completed)    Vitamin D deficiency        Relevant Orders    Vitamin D 25 hydroxy (Completed)    Encounter for lipid screening for cardiovascular disease        Relevant Orders    Lipid panel (Completed)    Screening for thyroid disorder        Relevant Orders    TSH, 3rd generation with Free T4 reflex (Completed)              Return in about 3 weeks (around 5/4/2023) for Recheck  Chief Complaint:     Chief Complaint   Patient presents with   • Physical Exam        History of Present Illness:     Ancelmo Suarez presents to the office today for a physical exam and concerns related to medication  She states that her psychiatrist recently stopped seeing her because of insurance and is no longer prescribing medication  She was being prescribed prazosin 1 mg 4 times daily and Prozac 40 mg daily  "She states that she is on waiting list for psychiatry  States she would like to wean off the benzodiazepines  States that originally she had been taking 2 mg 4 times daily  Patient reports feeling a lot of anxiety, stress and feeling overwhelmed  Denies SI  She is overdue for lab work and is agreeable to get this done before leaving office today  Review of Systems:     Review of Systems   Constitutional: Negative  Respiratory: Negative  Cardiovascular: Negative  Gastrointestinal: Negative  Skin: Negative  Neurological: Negative  Psychiatric/Behavioral: Positive for decreased concentration and sleep disturbance  Negative for suicidal ideas  The patient is nervous/anxious  Problem List:     Patient Active Problem List   Diagnosis   • Cervical disc disorder with radiculopathy of mid-cervical region   • Lumbar radiculopathy   • Major depression, recurrent, chronic (HCC)   • Anxiety   • Mixed hyperlipidemia   • Chronic pain syndrome   • Displaced fracture of lateral end of right clavicle with routine healing   • Benzodiazepine dependence (HCC)        Objective:     /80 (BP Location: Left arm, Patient Position: Sitting, Cuff Size: Standard)   Pulse 84   Temp 97 6 °F (36 4 °C) (Tympanic)   Resp 16   Ht 5' 2\" (1 575 m)   Wt 65 7 kg (144 lb 12 8 oz)   SpO2 98%   BMI 26 48 kg/m²     Physical Exam  Vitals and nursing note reviewed  Constitutional:       General: She is not in acute distress  Appearance: Normal appearance  She is well-developed and normal weight  HENT:      Head: Normocephalic and atraumatic  Right Ear: Tympanic membrane normal       Left Ear: Tympanic membrane normal       Nose: Nose normal       Mouth/Throat:      Mouth: Mucous membranes are moist       Pharynx: Oropharynx is clear  Eyes:      Conjunctiva/sclera: Conjunctivae normal       Pupils: Pupils are equal, round, and reactive to light  Neck:      Thyroid: No thyromegaly   " Cardiovascular:      Rate and Rhythm: Normal rate and regular rhythm  Pulses: Normal pulses  Heart sounds: Normal heart sounds, S1 normal and S2 normal  No murmur heard  Pulmonary:      Effort: Pulmonary effort is normal  No respiratory distress  Breath sounds: Normal breath sounds  Abdominal:      General: Bowel sounds are normal  There is no distension  Palpations: Abdomen is soft  Musculoskeletal:         General: Normal range of motion  Cervical back: Normal range of motion and neck supple  Right lower leg: No edema  Left lower leg: No edema  Lymphadenopathy:      Cervical: No cervical adenopathy  Skin:     General: Skin is warm and dry  Capillary Refill: Capillary refill takes less than 2 seconds  Neurological:      General: No focal deficit present  Mental Status: She is alert and oriented to person, place, and time  Psychiatric:         Attention and Perception: Attention normal          Mood and Affect: Mood is anxious  Affect is tearful  Behavior: Behavior is cooperative  Thought Content: Thought content does not include suicidal ideation  Cognition and Memory: Cognition normal          Judgment: Judgment normal          I spent 40 minutes with this patient      [address] [place] [address], [address] [place] [address],[address] PRIMARY CARE [place]

## 2023-04-19 LAB
AMPHETAMINES UR QL SCN: NEGATIVE NG/ML
BARBITURATES UR QL SCN: NEGATIVE NG/ML
BENZODIAZ UR QL: NEGATIVE
BZE UR QL: NEGATIVE NG/ML
CANNABINOIDS UR QL SCN: POSITIVE
METHADONE UR QL SCN: NEGATIVE NG/ML
OPIATES UR QL: POSITIVE
PCP UR QL: NEGATIVE NG/ML
PROPOXYPH UR QL SCN: NEGATIVE NG/ML

## 2023-04-23 PROBLEM — F13.20 BENZODIAZEPINE DEPENDENCE (HCC): Status: ACTIVE | Noted: 2023-04-23

## 2023-04-23 NOTE — ASSESSMENT & PLAN NOTE
Patient with long history of anxiety on long-term high-dose benzodiazepines  Psychiatrist recently discontinued seeing her secondary to insurance  Patient verbalizes she would like to continue weaning off her benzodiazepine  We will continue Xanax 1 mg in the morning 0 5 mg mid afternoon 0 5 mg late afternoon 1 mg at bedtime  She is on a waiting list for psychiatry  Continue Prozac 40 mg daily  PHQ 2 was positive however PHQ-9 scale was noted not to be fully completed after patient's appointment  We will follow-up with PHQ-9 scale at upcoming appointment as well as KELY-7 score  Patient also given number for walk-in clinic in [facility] pass  Will return in 2 to 3 weeks for reevaluation

## 2023-04-23 NOTE — ASSESSMENT & PLAN NOTE
History of anxiety with high-dose benzodiazepine use by previous psychiatrist   Patient reports that at one time she was on Xanax 2 mg 4 times daily  Last dosing of Xanax prescribed to her was 1 mg 4 times daily  She had her last pill last night  I spoke to her at length that benzodiazepines not have a medication and management of anxiety she does verbalize awareness and understanding of this is agreeable to continue weaning  Will order Xanax 1 mg in the morning, 0 5 mg late morning, 0 5 mg late afternoon, and 1 mg at bedtime as needed  We will follow-up in the next couple weeks    Patient is on a waiting list for a new psychiatrist

## 2023-04-23 NOTE — ASSESSMENT & PLAN NOTE
Patient with long history of anxiety and depression on long-term high-dose benzodiazepines  Psychiatrist recently discontinued seeing her secondary to insurance  Patient verbalizes she would like to continue weaning off her benzodiazepine  We will continue Xanax 1 mg in the morning 0 5 mg mid afternoon 0 5 mg late afternoon 1 mg at bedtime  She is on a waiting list for psychiatry  Continue Prozac 40 mg daily  PHQ 2 was positive however PHQ-9 scale was noted not to be fully completed after patient's appointment  Negative for SI we will follow-up with PHQ-9 scale at upcoming appointment as well as KELY-7 score  Patient also given number and address for walk-in clinic in [facility]  Will return in 2 to 3 weeks for reevaluation

## 2023-05-08 ENCOUNTER — OFFICE VISIT (OUTPATIENT)
Age: 48
End: 2023-05-08

## 2023-05-08 VITALS
OXYGEN SATURATION: 99 % | TEMPERATURE: 98 F | SYSTOLIC BLOOD PRESSURE: 120 MMHG | RESPIRATION RATE: 16 BRPM | WEIGHT: 146.2 LBS | DIASTOLIC BLOOD PRESSURE: 76 MMHG | HEIGHT: 62 IN | HEART RATE: 96 BPM | BODY MASS INDEX: 26.91 KG/M2

## 2023-05-08 DIAGNOSIS — E78.2 MIXED HYPERLIPIDEMIA: Chronic | ICD-10-CM

## 2023-05-08 DIAGNOSIS — E55.9 VITAMIN D DEFICIENCY: ICD-10-CM

## 2023-05-08 DIAGNOSIS — J45.21 MILD INTERMITTENT REACTIVE AIRWAY DISEASE WITH ACUTE EXACERBATION: ICD-10-CM

## 2023-05-08 DIAGNOSIS — F33.9 MAJOR DEPRESSION, RECURRENT, CHRONIC (HCC): Chronic | ICD-10-CM

## 2023-05-08 DIAGNOSIS — F13.20 BENZODIAZEPINE DEPENDENCE (HCC): ICD-10-CM

## 2023-05-08 DIAGNOSIS — Z00.00 HEALTHCARE MAINTENANCE: ICD-10-CM

## 2023-05-08 DIAGNOSIS — E78.2 MIXED HYPERLIPIDEMIA: Primary | Chronic | ICD-10-CM

## 2023-05-08 DIAGNOSIS — F41.9 ANXIETY: Chronic | ICD-10-CM

## 2023-05-08 RX ORDER — FLUTICASONE PROPIONATE 50 MCG
1 SPRAY, SUSPENSION (ML) NASAL DAILY
Qty: 48 G | Refills: 1 | Status: SHIPPED | OUTPATIENT
Start: 2023-05-08

## 2023-05-08 RX ORDER — ROSUVASTATIN CALCIUM 10 MG/1
TABLET, COATED ORAL
Qty: 90 TABLET | OUTPATIENT
Start: 2023-05-08

## 2023-05-08 RX ORDER — ALBUTEROL SULFATE 90 UG/1
AEROSOL, METERED RESPIRATORY (INHALATION)
Qty: 18 G | Refills: 5 | Status: SHIPPED | OUTPATIENT
Start: 2023-05-08

## 2023-05-08 RX ORDER — MELATONIN
1000 DAILY
COMMUNITY

## 2023-05-08 RX ORDER — ROSUVASTATIN CALCIUM 10 MG/1
10 TABLET, COATED ORAL DAILY
Qty: 30 TABLET | Refills: 5 | Status: SHIPPED | OUTPATIENT
Start: 2023-05-08

## 2023-05-08 RX ORDER — MULTIVIT-MIN/IRON FUM/FOLIC AC 7.5 MG-4
1 TABLET ORAL
COMMUNITY

## 2023-05-08 NOTE — PATIENT INSTRUCTIONS
Claritin, allegra, or zyrtec 1 tablet daily    Start Rosuvastatin 10mg daily    Repeat labs in 6 months    Cholesterol and Your Health   WHAT YOU NEED TO KNOW:   What is cholesterol? Cholesterol is a waxy, fat-like substance  Your body uses cholesterol to make hormones and new cells, and to protect nerves  Cholesterol is made by your body  It also comes from certain foods you eat, such as meat and dairy products  Your healthcare provider can help you set goals for your cholesterol levels  He or she can help you create a plan to meet your goals  What are cholesterol level goals? Your cholesterol level goals depend on your risk for heart disease, your age, and your other health conditions  The following are general guidelines: Total cholesterol  includes low-density lipoprotein (LDL), high-density lipoprotein (HDL), and triglyceride levels  The total cholesterol level should be lower than 200 mg/dL and is best at about 150 mg/dL  LDL cholesterol  is called bad cholesterol  because it forms plaque in your arteries  As plaque builds up, your arteries become narrow, and less blood flows through  When plaque decreases blood flow to your heart, you may have chest pain  If plaque completely blocks an artery that brings blood to your heart, you may have a heart attack  Plaque can break off and form blood clots  Blood clots may block arteries in your brain and cause a stroke  The level should be less than 130 mg/dL and is best at about 100 mg/dL  HDL cholesterol  is called good cholesterol  because it helps remove LDL cholesterol from your arteries  It does this by attaching to LDL cholesterol and carrying it to your liver  Your liver breaks down LDL cholesterol so your body can get rid of it  High levels of HDL cholesterol can help prevent a heart attack and stroke  Low levels of HDL cholesterol can increase your risk for heart disease, heart attack, and stroke   The level should be 60 mg/dL or higher  Triglycerides  are a type of fat that store energy from foods you eat  High levels of triglycerides also cause plaque buildup  This can increase your risk for a heart attack or stroke  If your triglyceride level is high, your LDL cholesterol level may also be high  The level should be less than 150 mg/dL  What increases my risk for high cholesterol? Smoking cigarettes    Being overweight or obese, or not getting enough exercise    Drinking large amounts of alcohol    A medical condition such as hypertension (high blood pressure) or diabetes    Certain genes passed from your parents to you    Age older than 72 years    What do I need to know about having my cholesterol levels checked? Adults 21to 39years of age should have their cholesterol levels checked every 4 to 6 years  Adults 45 years or older should have their cholesterol checked every 1 to 2 years  You may need your cholesterol checked more often, or at a younger age, if you have risk factors for heart disease  You may also need to have your cholesterol checked more often if you have other health conditions, such as diabetes  Blood tests are used to check cholesterol levels  Blood tests measure your levels of triglycerides, LDL cholesterol, and HDL cholesterol  How do healthy fats affect my cholesterol levels? Healthy fats, also called unsaturated fats, help lower LDL cholesterol and triglyceride levels  Healthy fats include the following:  Monounsaturated fats  are found in foods such as olive oil, canola oil, avocado, nuts, and olives  Polyunsaturated fats,  such as omega 3 fats, are found in fish, such as salmon, trout, and tuna  They can also be found in plant foods such as flaxseed, walnuts, and soybeans  How do unhealthy fats affect my cholesterol levels? Unhealthy fats increase LDL cholesterol and triglyceride levels   They are found in foods high in cholesterol, saturated fat, and trans fat:  Cholesterol  is found in eggs, dairy, and meat  Saturated fat  is found in butter, cheese, ice cream, whole milk, and coconut oil  Saturated fat is also found in meat, such as sausage, hot dogs, and bologna  Trans fat  is found in liquid oils and is used in fried and baked foods  Foods that contain trans fats include chips, crackers, muffins, sweet rolls, microwave popcorn, and cookies  How is high cholesterol treated? Treatment for high cholesterol will also decrease your risk of heart disease, heart attack, and stroke  Treatment may include any of the following:  Lifestyle changes  may include food, exercise, weight loss, and quitting smoking  You may also need to decrease the amount of alcohol you drink  Your healthcare provider will want you to start with lifestyle changes  Other treatment may be added if lifestyle changes are not enough  Your healthcare provider may recommend you work with a team to manage hyperlipidemia  The team may include medical experts such as a dietitian, an exercise or physical therapist, and a behavior therapist  Your family members may be included in helping you create lifestyle changes  Medicines  may be given to lower your LDL cholesterol, triglyceride levels, or total cholesterol level  You may need medicines to lower your cholesterol if any of the following is true:    You have a history of stroke, TIA, unstable angina, or a heart attack  Your LDL cholesterol level is 190 mg/dL or higher  You are age 36 to 76 years, have diabetes or heart disease risk factors, and your LDL cholesterol is 70 mg/dL or higher  Supplements  include fish oil, red yeast rice, and garlic  Fish oil may help lower your triglyceride and LDL cholesterol levels  It may also increase your HDL cholesterol level  Red yeast rice may help decrease your total cholesterol level and LDL cholesterol level  Garlic may help lower your total cholesterol level   Do not take any supplements without talking to your healthcare provider  What food changes can I make to lower my cholesterol levels? A dietitian can help you create a healthy eating plan  He or she can show you how to read food labels and choose foods low in saturated fat, trans fats, and cholesterol  Decrease the total amount of fat you eat  Choose lean meats, fat-free or 1% fat milk, and low-fat dairy products, such as yogurt and cheese  Try to limit or avoid red meats  Limit or do not eat fried foods or baked goods, such as cookies  Replace unhealthy fats with healthy fats  Cook foods in olive oil or canola oil  Choose soft margarines that are low in saturated fat and trans fat  Seeds, nuts, and avocados are other examples of healthy fats  Eat foods with omega-3 fats  Examples include salmon, tuna, mackerel, walnuts, and flaxseed  Eat fish 2 times per week  Pregnant women should not eat fish that have high levels of mercury, such as shark, swordfish, and clau mackerel  Increase the amount of high-fiber foods you eat  High-fiber foods can help lower your LDL cholesterol  Aim to get between 20 and 30 grams of fiber each day  Fruits and vegetables are high in fiber  Eat at least 5 servings each day  Other high-fiber foods are whole-grain or whole-wheat breads, pastas, or cereals, and brown rice  Eat 3 ounces of whole-grain foods each day  Increase fiber slowly  You may have abdominal discomfort, bloating, and gas if you add fiber to your diet too quickly  Eat healthy protein foods  Examples include low-fat dairy products, skinless chicken and turkey, fish, and nuts  Limit foods and drinks that are high in sugar  Your dietitian or healthcare provider can help you create daily limits for high-sugar foods and drinks  The limit may be lower if you have diabetes or another health condition  Limits can also help you lose weight if needed  What lifestyle changes can I make to lower my cholesterol levels? Maintain a healthy weight    Ask your healthcare provider what a healthy weight is for you  Ask him or her to help you create a weight loss plan if needed  Weight loss can decrease your total cholesterol and triglyceride levels  Weight loss may also help keep your blood pressure at a healthy level  Be physically active throughout the day  Physical activity, such as exercise, can help lower your total cholesterol level and maintain a healthy weight  Physical activity can also help increase your HDL cholesterol level  Work with your healthcare provider to create an program that is right for you  Get at least 30 to 40 minutes of moderate physical activity most days of the week  Examples of exercise include brisk walking, swimming, or biking  Also include strength training at least 2 times each week  Your healthcare providers can help you create a physical activity plan  Do not smoke  Nicotine and other chemicals in cigarettes and cigars can raise your cholesterol levels  Ask your healthcare provider for information if you currently smoke and need help to quit  E-cigarettes or smokeless tobacco still contain nicotine  Talk to your healthcare provider before you use these products  Limit or do not drink alcohol  Alcohol can increase your triglyceride levels  Ask your healthcare provider before you drink alcohol  Ask how much is okay for you to drink in 24 hours or 1 week  CARE AGREEMENT:   You have the right to help plan your care  Discuss treatment options with your healthcare provider to decide what care you want to receive  You always have the right to refuse treatment  The above information is an  only  It is not intended as medical advice for individual conditions or treatments  Talk to your doctor, nurse or pharmacist before following any medical regimen to see if it is safe and effective for you    © Copyright [employer] 2022 Information is for End User's use only and may not be sold, redistributed or otherwise used for commercial purposes

## 2023-05-08 NOTE — PROGRESS NOTES
[facility] - [facility] PRIMARY CARE [place]    NAME: Oscar Cortes  AGE: 49 y o  SEX: female  : 1975     DATE: 2023     Assessment and Plan:     Problem List Items Addressed This Visit        Other    Major depression, recurrent, chronic ([facility] [address] ) (Chronic)     Now seeing psychiatry  He increased fluoxetine to 60 mg daily  Anxiety (Chronic)     Patient is much improved from her previous visit  She is seeing psychiatry now  He has made adjustments to her Prozac and will be working with a therapist   She states that she has been practicing CBT techniques to help manage the anxiety  Still using alprazolam          Mixed hyperlipidemia - Primary (Chronic)      Latest Reference Range & Units 23 11:43   Cholesterol See Comment mg/dL 344 (H)   Triglycerides See   Comment mg/dL 196 (H)   HDL >=50 mg/dL 53   Non-HDL Cholesterol mg/dl 291   LDL Calculated 0 - 100 mg/dL 252 (H)   (H): Data is abnormally high    Discussed lifestyle modifications including diet and exercise  We will start her on rosuvastatin 10 mg daily  Repeat lipid panel prior to next appointment in 6 months         Relevant Medications    rosuvastatin (CRESTOR) 10 MG tablet    Other Relevant Orders    Lipid Panel with Direct LDL reflex    Benzodiazepine dependence ([facility] [address] )     Patient continues on alprazolam 2 to 3 mg a day  She is now seeing psychiatry who will be managing this prescription  She states that she has been trying to use Xanax less and applying more breathing, grounding and CBT techniques          Other Visit Diagnoses     Mild intermittent reactive airway disease with acute exacerbation        Relevant Medications    fluticasone (FLONASE) 50 mcg/act nasal spray    albuterol (Ventolin HFA) 90 mcg/act inhaler    Vitamin D deficiency        Relevant Orders    Vitamin D 25 hydroxy    Healthcare maintenance        Relevant Orders    Comprehensive metabolic panel    CBC and differential              No "follow-ups on file  Chief Complaint:     Chief Complaint   Patient presents with   • Follow-up        History of Present Illness:     Salty Ziegler presents to the office today for follow-up related to anxiety and review of recent lab work  Offers no new complaints or concerns  Review of Systems:     Review of Systems   Constitutional: Negative  HENT: Positive for congestion and postnasal drip  Respiratory: Negative  Cardiovascular: Negative  Gastrointestinal: Negative  Neurological: Negative  Psychiatric/Behavioral: The patient is nervous/anxious (improving )  Problem List:     Patient Active Problem List   Diagnosis   • Cervical disc disorder with radiculopathy of mid-cervical region   • Lumbar radiculopathy   • Major depression, recurrent, chronic (HCC)   • Anxiety   • Mixed hyperlipidemia   • Chronic pain syndrome   • Displaced fracture of lateral end of right clavicle with routine healing   • Benzodiazepine dependence (HCC)        Objective:     /76 (BP Location: Left arm, Patient Position: Sitting, Cuff Size: Standard)   Pulse 96   Temp 98 °F (36 7 °C) (Tympanic)   Resp 16   Ht 5' 2\" (1 575 m)   Wt 66 3 kg (146 lb 3 2 oz)   SpO2 99%   BMI 26 74 kg/m²     Physical Exam  Constitutional:       Appearance: Normal appearance  She is normal weight  HENT:      Head: Normocephalic and atraumatic  Nose: Congestion present  Mouth/Throat:      Mouth: Mucous membranes are moist       Pharynx: Oropharynx is clear  Eyes:      Conjunctiva/sclera: Conjunctivae normal    Cardiovascular:      Rate and Rhythm: Normal rate and regular rhythm  Pulses: Normal pulses  Heart sounds: Normal heart sounds  Pulmonary:      Effort: Pulmonary effort is normal  No respiratory distress  Breath sounds: Normal breath sounds  Musculoskeletal:      Cervical back: Normal range of motion and neck supple  Skin:     General: Skin is warm and dry        Capillary Refill: " Capillary refill takes less than 2 seconds  Neurological:      General: No focal deficit present  Mental Status: She is alert and oriented to person, place, and time  Psychiatric:         Mood and Affect: Mood normal          Behavior: Behavior normal          Thought Content: Thought content normal          Judgment: Judgment normal          I spent 20 minutes with this patient      [address] [place] [address],[address] PRIMARY CARE [place]

## 2023-05-11 NOTE — ASSESSMENT & PLAN NOTE
Patient is much improved from her previous visit  She is seeing psychiatry now  He has made adjustments to her Prozac and will be working with a therapist   She states that she has been practicing CBT techniques to help manage the anxiety    Still using alprazolam

## 2023-05-11 NOTE — ASSESSMENT & PLAN NOTE
Latest Reference Range & Units 04/13/23 11:43   Cholesterol See Comment mg/dL 344 (H)   Triglycerides See   Comment mg/dL 196 (H)   HDL >=50 mg/dL 53   Non-HDL Cholesterol mg/dl 291   LDL Calculated 0 - 100 mg/dL 252 (H)   (H): Data is abnormally high    Discussed lifestyle modifications including diet and exercise  We will start her on rosuvastatin 10 mg daily    Repeat lipid panel prior to next appointment in 6 months

## 2023-05-11 NOTE — ASSESSMENT & PLAN NOTE
Patient continues on alprazolam 2 to 3 mg a day  She is now seeing psychiatry who will be managing this prescription  She states that she has been trying to use Xanax less and applying more breathing, grounding and CBT techniques

## 2023-05-15 DIAGNOSIS — K21.9 GASTROESOPHAGEAL REFLUX DISEASE, UNSPECIFIED WHETHER ESOPHAGITIS PRESENT: ICD-10-CM

## 2023-05-15 DIAGNOSIS — R10.13 EPIGASTRIC PAIN: ICD-10-CM

## 2023-05-15 RX ORDER — OMEPRAZOLE 20 MG/1
CAPSULE, DELAYED RELEASE ORAL
Qty: 180 CAPSULE | Refills: 1 | Status: SHIPPED | OUTPATIENT
Start: 2023-05-15

## 2023-05-25 ENCOUNTER — HOSPITAL ENCOUNTER (EMERGENCY)
Facility: HOSPITAL | Age: 48
Discharge: HOME | End: 2023-05-25
Attending: EMERGENCY MEDICINE

## 2023-05-25 ENCOUNTER — APPOINTMENT (OUTPATIENT)
Dept: RADIOLOGY | Facility: HOSPITAL | Age: 48
End: 2023-05-25

## 2023-05-25 VITALS
SYSTOLIC BLOOD PRESSURE: 147 MMHG | HEART RATE: 74 BPM | TEMPERATURE: 98.1 F | OXYGEN SATURATION: 98 % | DIASTOLIC BLOOD PRESSURE: 64 MMHG | RESPIRATION RATE: 20 BRPM

## 2023-05-25 DIAGNOSIS — S43.004A DISLOCATION OF RIGHT SHOULDER JOINT, INITIAL ENCOUNTER: Primary | ICD-10-CM

## 2023-05-25 NOTE — ED PROVIDER NOTES
"History  Chief Complaint   Patient presents with   • Shoulder Injury     Pt reports R shoulder dislocation, popped it back in but states she heard a crack when it dislocated     50 yo female with distant h/o R shoulder dislocation and subsequent surgical repair who presents to the ED stating she dislocated her shoulder last night while reaching up and behind her head trying to close a window in her garage  She notes she heard a \"crack\" and felt sudden onset pain  Spontaneously reduced  No numbness or weakness  Still has her sling from the surgery, has been wearing that since yesterday  Prior to Admission Medications   Prescriptions Last Dose Informant Patient Reported? Taking?    ALPRAZolam (XANAX) 1 mg tablet   No No   Si tablet in the morning, 1/2 tablet at noon, 1/2 tablet at dinner, 1 mg at bedtime   FLUoxetine (PROzac) 40 MG capsule   No No   Sig: Take 1 capsule (40 mg total) by mouth every morning   Patient taking differently: Take 60 mg by mouth every morning   Melatonin 10 MG CAPS   Yes No   Sig: Take 10 mg by mouth   Multiple Vitamins-Minerals (multivitamin with minerals) tablet   Yes No   Sig: Take 1 tablet by mouth   albuterol (Ventolin HFA) 90 mcg/act inhaler   No No   Si-2 puffs inhaled every 4 to 6 hours as needed for wheezing or shortness of breath   cholecalciferol (VITAMIN D3) 1,000 units tablet   Yes No   Sig: Take 1,000 Units by mouth daily   fluticasone (FLONASE) 50 mcg/act nasal spray   No No   Si spray into each nostril daily   omeprazole (PriLOSEC) 20 mg delayed release capsule   No No   Sig: TAKE 2 CAPSULES BY MOUTH EVERY DAY   rosuvastatin (CRESTOR) 10 MG tablet   No No   Sig: Take 1 tablet (10 mg total) by mouth daily      Facility-Administered Medications: None       Past Medical History:   Diagnosis Date   • Anxiety    • Back injury    • Cervical radiculopathy    • Closed fracture of transverse process of thoracic vertebra ([facility] [address] ) 2021   • Degenerative disc " disease, cervical     pt states entire back   • Depression    • Head injury     hx of 4 concussions   • High cholesterol    • IBS (irritable bowel syndrome)    • Lumbar radiculopathy    • Memory loss    • Migraine    • Neck injury    • Sciatic nerve pain, left        Past Surgical History:   Procedure Laterality Date   • CYSTOSCOPY     • EGD AND COLONOSCOPY  2015   • SHOULDER SURGERY Right        Family History   Problem Relation Age of Onset   • ALS Mother    • Multiple sclerosis Father    • Multiple sclerosis Sister    • No Known Problems Brother    • No Known Problems Sister    • No Known Problems Brother    • Breast cancer Maternal Aunt    • Breast cancer Paternal Aunt      I have reviewed and agree with the history as documented  E-Cigarette/Vaping   • E-Cigarette Use Never User      E-Cigarette/Vaping Substances   • Nicotine No    • THC Yes    • CBD Yes    • Flavoring No    • Other No    • Unknown No      Social History     Tobacco Use   • Smoking status: Some Days     Packs/day: 0 25     Years: 15 00     Total pack years: 3 75     Types: Cigarettes   • Smokeless tobacco: Never   • Tobacco comments: Three cigs qd   Vaping Use   • Vaping Use: Never used   Substance Use Topics   • Alcohol use: Not Currently     Alcohol/week: 1 0 standard drink of alcohol     Types: 1 Glasses of wine per week   • Drug use: Not Currently     Types: Marijuana       Review of Systems   Musculoskeletal: Positive for arthralgias  Physical Exam  Physical Exam  Vitals and nursing note reviewed  Constitutional:       General: She is not in acute distress  Appearance: She is well-developed  She is not diaphoretic  HENT:      Head: Normocephalic and atraumatic  Eyes:      General:         Right eye: No discharge  Left eye: No discharge  Conjunctiva/sclera: Conjunctivae normal       Pupils: Pupils are equal, round, and reactive to light  Neck:      Vascular: No JVD     Pulmonary:      Effort: No respiratory distress  Breath sounds: No stridor  Musculoskeletal:         General: No swelling, tenderness, deformity or signs of injury  Normal range of motion  Cervical back: Normal range of motion and neck supple  Comments: Shoulder not dislocated  Clavicle nontender  Normal perfusion and sensation of RUE  Skin:     General: Skin is warm and dry  Capillary Refill: Capillary refill takes less than 2 seconds  Coloration: Skin is not jaundiced or pale  Findings: No bruising or erythema  Neurological:      General: No focal deficit present  Mental Status: She is alert and oriented to person, place, and time  Cranial Nerves: No cranial nerve deficit  Sensory: No sensory deficit  Motor: No weakness or abnormal muscle tone  Coordination: Coordination normal          Vital Signs  ED Triage Vitals [05/25/23 1629]   Temperature Pulse Respirations Blood Pressure SpO2   98 1 °F (36 7 °C) 74 20 147/64 98 %      Temp Source Heart Rate Source Patient Position - Orthostatic VS BP Location FiO2 (%)   Oral -- Sitting Left arm --      Pain Score       --           Vitals:    05/25/23 1629   BP: 147/64   Pulse: 74   Patient Position - Orthostatic VS: Sitting         Visual Acuity      ED Medications  Medications - No data to display    Diagnostic Studies  Results Reviewed     None                 XR shoulder 2+ views RIGHT   ED Interpretation by Chauncey Humphries MD (05/25 1651)   No acute abnormality  Procedures  Procedures         ED Course                               SBIRT 20yo+    Flowsheet Row Most Recent Value   Initial Alcohol Screen: US AUDIT-C     1  How often do you have a drink containing alcohol? 0 Filed at: 05/25/2023 1651   2  How many drinks containing alcohol do you have on a typical day you are drinking? 0 Filed at: 05/25/2023 1651   3b  FEMALE Any Age, or MALE 65+: How often do you have 4 or more drinks on one occassion?  0 Filed at: 05/25/2023 1651   Audit-C Score 0 Filed at: 05/25/2023 1651   MICHAEL: How many times in the past year have you    Used an illegal drug or used a prescription medication for non-medical reasons? Never Filed at: 05/25/2023 1651                    Medical Decision Making  Dislocation of right shoulder joint, initial encounter: complicated acute illness or injury that poses a threat to life or bodily functions     Details: with subsequent spontaneous reduction  sling already in place  referral to ortho  Amount and/or Complexity of Data Reviewed  Radiology: ordered and independent interpretation performed  Disposition  Final diagnoses:   Dislocation of right shoulder joint, initial encounter     Time reflects when diagnosis was documented in both MDM as applicable and the Disposition within this note     Time User Action Codes Description Comment    5/25/2023  4:52 PM Ollie Garcia Add [[id number]] Dislocation of right shoulder joint, initial encounter       ED Disposition     ED Disposition   Discharge    Condition   Stable    Date/Time   u May 25, 2023  4:52 PM    Comment   Carmelina Alvaradoimisa discharge to home/self care                 Follow-up Information    None         Discharge Medication List as of 5/25/2023  4:56 PM      CONTINUE these medications which have NOT CHANGED    Details   albuterol (Ventolin HFA) 90 mcg/act inhaler 1-2 puffs inhaled every 4 to 6 hours as needed for wheezing or shortness of breath, Normal      ALPRAZolam (XANAX) 1 mg tablet 1 tablet in the morning, 1/2 tablet at noon, 1/2 tablet at dinner, 1 mg at bedtime, Normal      cholecalciferol (VITAMIN D3) 1,000 units tablet Take 1,000 Units by mouth daily, Historical Med      FLUoxetine (PROzac) 40 MG capsule Take 1 capsule (40 mg total) by mouth every morning, Starting Mon 3/27/2023, Normal      fluticasone (FLONASE) 50 mcg/act nasal spray 1 spray into each nostril daily, Starting Mon 5/8/2023, Normal      Melatonin 10 MG CAPS Take 10 mg by mouth, Historical Med      Multiple Vitamins-Minerals (multivitamin with minerals) tablet Take 1 tablet by mouth, Historical Med      omeprazole (PriLOSEC) 20 mg delayed release capsule TAKE 2 CAPSULES BY MOUTH EVERY DAY, Normal      rosuvastatin (CRESTOR) 10 MG tablet Take 1 tablet (10 mg total) by mouth daily, Starting Mon 5/8/2023, Normal                 PDMP Review       Value Time User    PDMP Reviewed  Yes 4/13/2023 11:04 AM Siria Lock, 10 [facility] Provider  Electronically Signed by           Jocelyn Drew MD  05/25/23 8277

## 2023-11-09 ENCOUNTER — OFFICE VISIT (OUTPATIENT)
Dept: OBGYN CLINIC | Facility: MEDICAL CENTER | Age: 48
End: 2023-11-09
Payer: COMMERCIAL

## 2023-11-09 VITALS
SYSTOLIC BLOOD PRESSURE: 102 MMHG | WEIGHT: 147 LBS | DIASTOLIC BLOOD PRESSURE: 70 MMHG | BODY MASS INDEX: 26.05 KG/M2 | HEIGHT: 63 IN

## 2023-11-09 DIAGNOSIS — N94.10 DYSPAREUNIA IN FEMALE: Primary | ICD-10-CM

## 2023-11-09 DIAGNOSIS — R10.2 PELVIC PAIN: ICD-10-CM

## 2023-11-09 PROCEDURE — 99213 OFFICE O/P EST LOW 20 MIN: CPT | Performed by: NURSE PRACTITIONER

## 2023-11-09 NOTE — PROGRESS NOTES
Assessment/Plan:  Smoking cessation recommended. Declined referral .   Pelvic US ordered. Normal pelvic exam findings. Follow up with pain management for disc herniations. Silicone based lubricant with sex. Use vaginal moisturizers twice weekly as needed. Follow up with therapist for anxiety and depression. Continue with fluoxetine as directed. Discussed healthy lifestyle-eating well, remaining hydrated, exercise 150-300 minutes per week as able. Consider follow up with GI. Return for annual exam.           1. Dyspareunia in female  -     US pelvis complete w transvaginal; Future; Expected date: 2023    2. Pelvic pain  -     US pelvis complete w transvaginal; Future; Expected date: 2023               Subjective:      Patient ID: Edmar Mena is a 51 y.o. female. HPI    Edmar Mena is a 51 y.o.  female who is here today for a problem visit . Last in office on  for annual exam.   Today c/o abdominal swelling that occurs monthly around the time she would expect her menses (even if her menses doesn't happen). Accompanied by my lower left pelvic pain rating 7/10 x 6 + months but is worsening. Ice and massage may lessen the pain. No aggravating factors. Hx of IBS and plans to schedule an appt with GI. She does not feel this is IBS related. She notices a physical swelling of her lower abdomen during this time. No bowel or bladder changes. No vomiting but admits to "always have nausea" related to acid reflux. Take prilosec. Also admits to intermittent insertional and thrusting dyspareunia x 6+ months wit  of 9 years. Pain is bilateral lower pelvic. Admits to vaginal dryness. Occurs 8 out 10 times. No alleviating or aggravating factors. Rates pain 8/10 at worst. Lessens to over time of sexual experience. She hides this from her . She has become sexually avoidant. 4/3/22 MRI showed herniation at L 2-3,3-4, 4-5. LMP 3/23. Irregular menses x 4 days. States heavy flow noted on left side of tampon for 1 day then mod to light flow. She denies vaginal discharge, itching. She has no urinary concerns, does not have incontinence. No bowel concerns. No breast concerns. Smokes 1-2 cigs per day. Considering quitting and declines need for referral to smoking cessation. The following portions of the patient's history were reviewed and updated as appropriate: allergies, current medications, past family history, past medical history, past social history, past surgical history, and problem list.    Review of Systems   Constitutional: Negative. Respiratory:  Negative for chest tightness and shortness of breath. Cardiovascular:  Negative for chest pain. Gastrointestinal:  Positive for abdominal distention and nausea. Negative for abdominal pain, anal bleeding, blood in stool, constipation, diarrhea, rectal pain and vomiting. Genitourinary:  Positive for dyspareunia, menstrual problem and pelvic pain. Negative for difficulty urinating, dysuria, flank pain, frequency, genital sores, urgency, vaginal bleeding, vaginal discharge and vaginal pain. Skin: Negative. Neurological:  Negative for weakness and headaches. Psychiatric/Behavioral: Negative. All other systems reviewed and are negative. Objective:      /70 (BP Location: Left arm, Patient Position: Sitting, Cuff Size: Standard)   Ht 5' 2.5" (1.588 m)   Wt 66.7 kg (147 lb)   LMP 03/01/2023 (Approximate)   BMI 26.46 kg/m²          Physical Exam  Vitals and nursing note reviewed. Constitutional:       Appearance: Normal appearance. She is well-developed. Genitourinary:     General: Normal vulva. Exam position: Lithotomy position. Labia:         Right: No rash, tenderness, lesion or injury. Left: No rash, tenderness, lesion or injury. Urethra: No prolapse, urethral pain, urethral swelling or urethral lesion.       Vagina: Normal.      Cervix: Normal. Uterus: Normal.       Adnexa: Right adnexa normal and left adnexa normal.        Right: No mass, tenderness or fullness. Left: No mass, tenderness or fullness. Rectum: No external hemorrhoid. Musculoskeletal:         General: Normal range of motion. Lymphadenopathy:      Lower Body: No right inguinal adenopathy. No left inguinal adenopathy. Skin:     General: Skin is warm and dry. Neurological:      Mental Status: She is alert and oriented to person, place, and time.    Psychiatric:         Mood and Affect: Mood normal.         Behavior: Behavior normal.

## 2023-11-09 NOTE — PATIENT INSTRUCTIONS
Smoking cessation recommended. Declined referral .   Pelvic US ordered. Normal pelvic exam findings. Follow up with pain management for disc herniations. Silicone based lubricant with sex. Use vaginal moisturizers twice weekly as needed. Follow up with therapist for anxiety and depression. Continue with fluoxetine as directed. Discussed healthy lifestyle-eating well, remaining hydrated, exercise 150-300 minutes per week as able. Consider follow up with GI.    Return for annual exam.

## 2023-11-27 ENCOUNTER — TELEPHONE (OUTPATIENT)
Dept: OBGYN CLINIC | Facility: CLINIC | Age: 48
End: 2023-11-27

## 2023-11-27 ENCOUNTER — TELEPHONE (OUTPATIENT)
Dept: OBGYN CLINIC | Facility: MEDICAL CENTER | Age: 48
End: 2023-11-27

## 2023-11-30 DIAGNOSIS — E78.2 MIXED HYPERLIPIDEMIA: Chronic | ICD-10-CM

## 2023-11-30 RX ORDER — ROSUVASTATIN CALCIUM 10 MG/1
10 TABLET, COATED ORAL DAILY
Qty: 30 TABLET | Refills: 5 | Status: SHIPPED | OUTPATIENT
Start: 2023-11-30

## 2023-12-14 ENCOUNTER — HOSPITAL ENCOUNTER (OUTPATIENT)
Dept: ULTRASOUND IMAGING | Facility: CLINIC | Age: 48
Discharge: HOME | End: 2023-12-14
Payer: COMMERCIAL

## 2023-12-14 DIAGNOSIS — N94.10 DYSPAREUNIA IN FEMALE: ICD-10-CM

## 2023-12-14 DIAGNOSIS — R10.2 PELVIC PAIN: ICD-10-CM

## 2023-12-14 PROCEDURE — 76830 TRANSVAGINAL US NON-OB: CPT

## 2023-12-14 PROCEDURE — 76856 US EXAM PELVIC COMPLETE: CPT

## 2024-04-24 ENCOUNTER — TELEPHONE (OUTPATIENT)
Dept: OBGYN CLINIC | Facility: CLINIC | Age: 49
End: 2024-04-24

## 2024-05-23 ENCOUNTER — TELEPHONE (OUTPATIENT)
Age: 49
End: 2024-05-23

## 2024-05-23 NOTE — TELEPHONE ENCOUNTER
Patient called to ask for a dental recommendation.    Patient is experiencing dental pain as well as jaw pain.  She would like a recommendation for dental care, but does NOT want [facility].

## 2024-05-24 ENCOUNTER — HOSPITAL ENCOUNTER (EMERGENCY)
Facility: HOSPITAL | Age: 49
Discharge: HOME | End: 2024-05-24
Payer: MEDICARE

## 2024-05-24 VITALS
RESPIRATION RATE: 16 BRPM | HEART RATE: 82 BPM | DIASTOLIC BLOOD PRESSURE: 62 MMHG | TEMPERATURE: 97.5 F | SYSTOLIC BLOOD PRESSURE: 114 MMHG | OXYGEN SATURATION: 100 %

## 2024-05-24 DIAGNOSIS — K06.9 GUM DISEASE: Primary | ICD-10-CM

## 2024-05-24 DIAGNOSIS — K08.89 DENTALGIA: ICD-10-CM

## 2024-05-24 DIAGNOSIS — K04.7 DENTAL INFECTION: ICD-10-CM

## 2024-05-24 PROCEDURE — 99284 EMERGENCY DEPT VISIT MOD MDM: CPT

## 2024-05-24 RX ORDER — CHLORHEXIDINE GLUCONATE ORAL RINSE 1.2 MG/ML
15 SOLUTION DENTAL 2 TIMES DAILY
Qty: 120 ML | Refills: 0 | Status: SHIPPED | OUTPATIENT
Start: 2024-05-24

## 2024-05-24 RX ORDER — AMOXICILLIN AND CLAVULANATE POTASSIUM 875; 125 MG/1; MG/1
1 TABLET, FILM COATED ORAL EVERY 12 HOURS
Qty: 14 TABLET | Refills: 0 | Status: SHIPPED | OUTPATIENT
Start: 2024-05-24 | End: 2024-05-31

## 2024-05-24 RX ORDER — OXYCODONE HYDROCHLORIDE 5 MG/1
5 TABLET ORAL EVERY 4 HOURS PRN
Qty: 6 TABLET | Refills: 0 | Status: SHIPPED | OUTPATIENT
Start: 2024-05-24 | End: 2024-05-29

## 2024-05-24 RX ORDER — AMOXICILLIN AND CLAVULANATE POTASSIUM 875; 125 MG/1; MG/1
1 TABLET, FILM COATED ORAL ONCE
Status: COMPLETED | OUTPATIENT
Start: 2024-05-24 | End: 2024-05-24

## 2024-05-24 RX ORDER — IBUPROFEN 600 MG/1
600 TABLET ORAL ONCE
Status: COMPLETED | OUTPATIENT
Start: 2024-05-24 | End: 2024-05-24

## 2024-05-24 RX ORDER — IBUPROFEN 600 MG/1
600 TABLET ORAL EVERY 6 HOURS PRN
Qty: 30 TABLET | Refills: 0 | Status: SHIPPED | OUTPATIENT
Start: 2024-05-24

## 2024-05-24 RX ADMIN — AMOXICILLIN AND CLAVULANATE POTASSIUM 1 TABLET: 875; 125 TABLET, FILM COATED ORAL at 09:13

## 2024-05-24 RX ADMIN — IBUPROFEN 600 MG: 600 TABLET, FILM COATED ORAL at 09:13

## 2024-05-24 NOTE — DISCHARGE INSTRUCTIONS
It is very important that you followup with a dentist as he has multiple teeth that will require immediate attention.  In the meantime you need to be on antibiotics for the next 7 days.  I also prescribed you a course of mouthwash to be used for gum disease.  Please use pain medication only as needed, anti-inflammatories may help more with the swelling and prevent future pain.

## 2024-05-24 NOTE — ED PROVIDER NOTES
History  Chief Complaint   Patient presents with    Dental Pain     Pt c/o left sided dental pain that radiates into her jaw x 3 days      This is a 48-year-old female who is presenting with dentalgia.  Patient reports for several days she has been having significantly worsening pain in her left maxillary molar which radiates upwards into her maxillary sinus and downwards towards her lower tooth.  She denies any fevers or chills or night sweats.  She notes that her symptoms are worse with temperature changes.  She points to tooth #13 or 14 as the source of her dentalgia.  These teeth do have obvious caries as well as gum disease.  She states that it has been a while since she was seen by dentistry.  She denies any trismus, facial swelling, voice change, or chest pain.        Prior to Admission Medications   Prescriptions Last Dose Informant Patient Reported? Taking?   ALPRAZolam (XANAX) 1 mg tablet  Self No No   Si tablet in the morning, 1/2 tablet at noon, 1/2 tablet at dinner, 1 mg at bedtime   Calcium Carbonate (CALTRATE 600 PO)  Self Yes No   Sig: Take by mouth   FLUoxetine (PROzac) 40 MG capsule  Self No No   Sig: Take 1 capsule (40 mg total) by mouth every morning   Patient taking differently: Take 60 mg by mouth every morning   Melatonin 10 MG CAPS  Self Yes No   Sig: Take 10 mg by mouth   Multiple Vitamins-Minerals (multivitamin with minerals) tablet  Self Yes No   Sig: Take 1 tablet by mouth   albuterol (Ventolin HFA) 90 mcg/act inhaler  Self No No   Si-2 puffs inhaled every 4 to 6 hours as needed for wheezing or shortness of breath   cholecalciferol (VITAMIN D3) 1,000 units tablet  Self Yes No   Sig: Take 1,000 Units by mouth daily   fluticasone (FLONASE) 50 mcg/act nasal spray  Self No No   Si spray into each nostril daily   omeprazole (PriLOSEC) 20 mg delayed release capsule  Self No No   Sig: TAKE 2 CAPSULES BY MOUTH EVERY DAY   rosuvastatin (CRESTOR) 10 MG tablet   No No   Sig: Take 1  tablet (10 mg total) by mouth daily      Facility-Administered Medications: None       Past Medical History:   Diagnosis Date    Anxiety     Back injury     Cervical radiculopathy     Closed fracture of transverse process of thoracic vertebra (HCC) 6/26/2021    Degenerative disc disease, cervical     pt states entire back    Depression     Head injury     hx of 4 concussions    High cholesterol     IBS (irritable bowel syndrome)     Lumbar radiculopathy     Memory loss     Migraine     Neck injury     Sciatic nerve pain, left        Past Surgical History:   Procedure Laterality Date    CYSTOSCOPY      EGD AND COLONOSCOPY  2015    SHOULDER SURGERY Right        Family History   Problem Relation Age of Onset    ALS Mother     Multiple sclerosis Father     Multiple sclerosis Sister     No Known Problems Brother     No Known Problems Sister     No Known Problems Brother     Breast cancer Maternal Aunt     Breast cancer Paternal Aunt      I have reviewed and agree with the history as documented.    E-Cigarette/Vaping    E-Cigarette Use Never User      E-Cigarette/Vaping Substances    Nicotine No     THC Yes     CBD Yes     Flavoring No     Other No     Unknown No      Social History     Tobacco Use    Smoking status: Every Day     Current packs/day: 0.25     Average packs/day: 0.3 packs/day for 15.0 years (3.8 ttl pk-yrs)     Types: Cigarettes    Smokeless tobacco: Never    Tobacco comments:     Three cigs qd   Vaping Use    Vaping status: Never Used   Substance Use Topics    Alcohol use: Not Currently     Alcohol/week: 1.0 standard drink of alcohol     Types: 1 Glasses of wine per week    Drug use: Yes     Types: Marijuana       Review of Systems   Constitutional:  Negative for chills and fever.   HENT:  Positive for dental problem. Negative for ear pain and sore throat.    Eyes:  Negative for pain and visual disturbance.   Respiratory:  Negative for cough and shortness of breath.    Cardiovascular:  Negative for chest  pain and palpitations.   Gastrointestinal:  Negative for abdominal pain and vomiting.   Genitourinary:  Negative for dysuria and hematuria.   Musculoskeletal:  Negative for arthralgias and back pain.   Skin:  Negative for color change and rash.   Neurological:  Negative for seizures and syncope.   All other systems reviewed and are negative.      Physical Exam  Physical Exam  Vitals and nursing note reviewed.   Constitutional:       General: She is not in acute distress.     Appearance: She is well-developed.   HENT:      Head: Normocephalic and atraumatic.      Right Ear: External ear normal.      Left Ear: External ear normal.      Nose: Nose normal. No congestion or rhinorrhea.      Mouth/Throat:      Mouth: Mucous membranes are moist.      Dentition: Abnormal dentition. Dental tenderness, dental caries and gum lesions present.      Pharynx: Oropharynx is clear. No oropharyngeal exudate or posterior oropharyngeal erythema.        Comments: Dentalgia  Eyes:      General: No scleral icterus.     Extraocular Movements: Extraocular movements intact.      Conjunctiva/sclera: Conjunctivae normal.      Pupils: Pupils are equal, round, and reactive to light.   Cardiovascular:      Rate and Rhythm: Normal rate and regular rhythm.      Pulses: Normal pulses.      Heart sounds: Normal heart sounds. No murmur heard.  Pulmonary:      Effort: Pulmonary effort is normal. No respiratory distress.      Breath sounds: Normal breath sounds. No wheezing or rhonchi.   Abdominal:      General: Abdomen is flat. There is no distension.      Palpations: Abdomen is soft.      Tenderness: There is no abdominal tenderness. There is no guarding.   Musculoskeletal:         General: No swelling.      Cervical back: Neck supple. No rigidity.      Right lower leg: No edema.      Left lower leg: No edema.   Lymphadenopathy:      Cervical: No cervical adenopathy.   Skin:     General: Skin is warm and dry.      Capillary Refill: Capillary refill  takes less than 2 seconds.      Coloration: Skin is not jaundiced.      Findings: No rash.   Neurological:      General: No focal deficit present.      Mental Status: She is alert and oriented to person, place, and time. Mental status is at baseline.   Psychiatric:         Mood and Affect: Mood normal.         Behavior: Behavior normal.         Vital Signs  ED Triage Vitals   Temperature Pulse Respirations Blood Pressure SpO2   05/24/24 0830 05/24/24 0830 05/24/24 0830 05/24/24 0830 05/24/24 0830   97.5 °F (36.4 °C) 82 16 114/62 100 %      Temp Source Heart Rate Source Patient Position - Orthostatic VS BP Location FiO2 (%)   05/24/24 0830 05/24/24 0830 05/24/24 0830 05/24/24 0830 --   Temporal Monitor Sitting Left arm       Pain Score       05/24/24 0913       8           Vitals:    05/24/24 0830   BP: 114/62   Pulse: 82   Patient Position - Orthostatic VS: Sitting         Visual Acuity      ED Medications  Medications   ibuprofen (MOTRIN) tablet 600 mg (600 mg Oral Given 5/24/24 0913)   amoxicillin-clavulanate (AUGMENTIN) 875-125 mg per tablet 1 tablet (1 tablet Oral Given 5/24/24 0913)       Diagnostic Studies  Results Reviewed       None                   No orders to display              Procedures  Procedures         ED Course                               SBIRT 22yo+      Flowsheet Row Most Recent Value   Initial Alcohol Screen: US AUDIT-C     1. How often do you have a drink containing alcohol? 0 Filed at: 05/24/2024 0831   2. How many drinks containing alcohol do you have on a typical day you are drinking?  0 Filed at: 05/24/2024 0831   3b. FEMALE Any Age, or MALE 65+: How often do you have 4 or more drinks on one occassion? 0 Filed at: 05/24/2024 0831   Audit-C Score 0 Filed at: 05/24/2024 0831   MICHAEL: How many times in the past year have you...    Used an illegal drug or used a prescription medication for non-medical reasons? Never Filed at: 05/24/2024 0831                      Medical Decision  Making  Medical complexity: 48-year-old female presenting with dentalgia.  Dental lesions of concern for dental fractures and extensive caries as well as gum disease.  No obvious periapical abscess or drainable collection.    - We will give tylenol/motrin for pain.  - We will write for chlorhexidine to sterilize the area and prevent further worsening of infection or repeat infection.  - Given the appearance, we will also do antibiotics:  [    ] PCN 500mg QID  [    ] Amoxicillin 20-40mg/kg/day TID  [ x ] Augmentin 20mg/kg BID  [    ] Clindamycin (PCN allergy or worsening infection after 72 hours of amoxicillin) 8-20mg/kg/day TID  [    ] Azithromycin (alternative to clindamycin) 5-12mg/kg Qday  [    ] Metronidazole (30mg/kg/day QID) + Amoxicillin if suspected resistant infection  - The patient will follow-up with her primary care or dentist for re-evaluation of her symptoms.  - The patient has no red flags for Ramez's or serious dental infection at this juncture. The patient doesn't appear toxic, nor has rapid progression of symptoms. Patient isn't immunocompromised. Patient has no SOB nor trouble swallowing. Patient doesn't appear dehydrated nor demonstrates trismus on exam.      Risk  Prescription drug management.             Disposition  Final diagnoses:   Gum disease   Dental infection   Dentalgia     Time reflects when diagnosis was documented in both MDM as applicable and the Disposition within this note       Time User Action Codes Description Comment    5/24/2024  9:08 AM Chris Bautista Add [K06.9] Gum disease     5/24/2024  9:08 AM Chris Bautista Add [K04.7] Dental infection     5/24/2024  9:08 AM Chris Bautista Add [K08.89] Dentalgia           ED Disposition       ED Disposition   Discharge    Condition   Stable    Date/Time   Fri May 24, 2024 0908    Comment   Carmelina DiSimone discharge to home/self care.                   Follow-up Information       Follow up With Specialties Details Why Contact Info  Additional Information    Gomez Paulino DO Internal Medicine   [address]  [address]nd Floor  [facility] [address]  [phone]       [facility] Emergency Department Emergency Medicine Go to  If symptoms worsen, As needed [address] [id number] [phone] [facility] Emergency Department, [address], [place], [address]            Patient's Medications   Discharge Prescriptions    AMOXICILLIN-CLAVULANATE (AUGMENTIN) 875-125 MG PER TABLET    Take 1 tablet by mouth every 12 (twelve) hours for 7 days       Start Date: 5/24/2024 End Date: 5/31/2024       Order Dose: 1 tablet       Quantity: 14 tablet    Refills: 0    CHLORHEXIDINE (PERIDEX) 0.12 % SOLUTION    Apply 15 mL to the mouth or throat 2 (two) times a day       Start Date: 5/24/2024 End Date: --       Order Dose: 15 mL       Quantity: 120 mL    Refills: 0    IBUPROFEN (MOTRIN) 600 MG TABLET    Take 1 tablet (600 mg total) by mouth every 6 (six) hours as needed for mild pain or moderate pain       Start Date: 5/24/2024 End Date: --       Order Dose: 600 mg       Quantity: 30 tablet    Refills: 0    OXYCODONE (ROXICODONE) 5 IMMEDIATE RELEASE TABLET    Take 1 tablet (5 mg total) by mouth every 4 (four) hours as needed for moderate pain for up to 5 days Max Daily Amount: 15 mg       Start Date: 5/24/2024 End Date: 5/29/2024       Order Dose: 5 mg       Quantity: 6 tablet    Refills: 0       No discharge procedures on file.    PDMP Review         Value Time User    PDMP Reviewed  Yes 4/13/2023 11:04 AM PETRONA Costa            ED Provider  Electronically Signed by             Chris Bautista MD  05/24/24 0951

## 2024-07-17 ENCOUNTER — TELEPHONE (OUTPATIENT)
Age: 49
End: 2024-07-17

## 2024-08-20 ENCOUNTER — TELEPHONE (OUTPATIENT)
Age: 49
End: 2024-08-20

## 2024-08-20 NOTE — TELEPHONE ENCOUNTER
Contacted patient off of Medication Management  and NON-REFERRAL to verify needs of services in attempts to offer patient an appointment. LVM for patient to contact intake dept  in regards to wait list       PATIENT REMOVED FROM LIST DUE TO UNABLE TO CONTACT PATIENT AFTER 2ND ATTEMPT.

## 2024-08-21 NOTE — TELEPHONE ENCOUNTER
Pt returned call regarding med mgmt and stated that she is currently receiving services elsewhere.  Pt is in need of talk therapy. Writer added pt back onto wait list.    Pt requesting outside resource guide  Email: [email or web address]

## 2024-08-29 ENCOUNTER — APPOINTMENT (OUTPATIENT)
Age: 49
End: 2024-08-29
Payer: MEDICARE

## 2024-08-29 ENCOUNTER — OFFICE VISIT (OUTPATIENT)
Age: 49
End: 2024-08-29
Payer: MEDICARE

## 2024-08-29 VITALS
OXYGEN SATURATION: 98 % | DIASTOLIC BLOOD PRESSURE: 74 MMHG | HEART RATE: 62 BPM | WEIGHT: 144.6 LBS | TEMPERATURE: 96.4 F | HEIGHT: 63 IN | BODY MASS INDEX: 25.62 KG/M2 | RESPIRATION RATE: 18 BRPM | SYSTOLIC BLOOD PRESSURE: 118 MMHG

## 2024-08-29 DIAGNOSIS — Z12.31 ENCOUNTER FOR SCREENING MAMMOGRAM FOR BREAST CANCER: ICD-10-CM

## 2024-08-29 DIAGNOSIS — E78.2 MIXED HYPERLIPIDEMIA: Chronic | ICD-10-CM

## 2024-08-29 DIAGNOSIS — F33.9 MAJOR DEPRESSION, RECURRENT, CHRONIC (HCC): Primary | ICD-10-CM

## 2024-08-29 DIAGNOSIS — Z00.00 MEDICARE ANNUAL WELLNESS VISIT, SUBSEQUENT: ICD-10-CM

## 2024-08-29 DIAGNOSIS — F13.20 BENZODIAZEPINE DEPENDENCE (HCC): ICD-10-CM

## 2024-08-29 LAB
ALBUMIN SERPL BCG-MCNC: 4.3 G/DL (ref 3.5–5)
ALP SERPL-CCNC: 54 U/L (ref 34–104)
ALT SERPL W P-5'-P-CCNC: 27 U/L (ref 7–52)
ANION GAP SERPL CALCULATED.3IONS-SCNC: 9 MMOL/L (ref 4–13)
AST SERPL W P-5'-P-CCNC: 29 U/L (ref 13–39)
BASOPHILS # BLD AUTO: 0.07 THOUSANDS/ÂΜL (ref 0–0.1)
BASOPHILS NFR BLD AUTO: 1 % (ref 0–1)
BILIRUB SERPL-MCNC: 0.41 MG/DL (ref 0.2–1)
BUN SERPL-MCNC: 9 MG/DL (ref 5–25)
CALCIUM SERPL-MCNC: 9.3 MG/DL (ref 8.4–10.2)
CHLORIDE SERPL-SCNC: 103 MMOL/L (ref 96–108)
CHOLEST SERPL-MCNC: 170 MG/DL
CO2 SERPL-SCNC: 30 MMOL/L (ref 21–32)
CREAT SERPL-MCNC: 0.63 MG/DL (ref 0.6–1.3)
EOSINOPHIL # BLD AUTO: 0.1 THOUSAND/ÂΜL (ref 0–0.61)
EOSINOPHIL NFR BLD AUTO: 1 % (ref 0–6)
ERYTHROCYTE [DISTWIDTH] IN BLOOD BY AUTOMATED COUNT: 12.2 % (ref 11.6–15.1)
GFR SERPL CREATININE-BSD FRML MDRD: 105 ML/MIN/1.73SQ M
GLUCOSE P FAST SERPL-MCNC: 89 MG/DL (ref 65–99)
HCT VFR BLD AUTO: 41.4 % (ref 34.8–46.1)
HDLC SERPL-MCNC: 53 MG/DL
HGB BLD-MCNC: 13.1 G/DL (ref 11.5–15.4)
IMM GRANULOCYTES # BLD AUTO: 0.02 THOUSAND/UL (ref 0–0.2)
IMM GRANULOCYTES NFR BLD AUTO: 0 % (ref 0–2)
LDLC SERPL CALC-MCNC: 97 MG/DL (ref 0–100)
LYMPHOCYTES # BLD AUTO: 2.97 THOUSANDS/ÂΜL (ref 0.6–4.47)
LYMPHOCYTES NFR BLD AUTO: 27 % (ref 14–44)
MCH RBC QN AUTO: 31.4 PG (ref 26.8–34.3)
MCHC RBC AUTO-ENTMCNC: 31.6 G/DL (ref 31.4–37.4)
MCV RBC AUTO: 99 FL (ref 82–98)
MONOCYTES # BLD AUTO: 0.63 THOUSAND/ÂΜL (ref 0.17–1.22)
MONOCYTES NFR BLD AUTO: 6 % (ref 4–12)
NEUTROPHILS # BLD AUTO: 7.04 THOUSANDS/ÂΜL (ref 1.85–7.62)
NEUTS SEG NFR BLD AUTO: 65 % (ref 43–75)
NRBC BLD AUTO-RTO: 0 /100 WBCS
PLATELET # BLD AUTO: 295 THOUSANDS/UL (ref 149–390)
PMV BLD AUTO: 11.9 FL (ref 8.9–12.7)
POTASSIUM SERPL-SCNC: 4.1 MMOL/L (ref 3.5–5.3)
PROT SERPL-MCNC: 7.3 G/DL (ref 6.4–8.4)
RBC # BLD AUTO: 4.17 MILLION/UL (ref 3.81–5.12)
SODIUM SERPL-SCNC: 142 MMOL/L (ref 135–147)
TRIGL SERPL-MCNC: 100 MG/DL
WBC # BLD AUTO: 10.83 THOUSAND/UL (ref 4.31–10.16)

## 2024-08-29 PROCEDURE — G0438 PPPS, INITIAL VISIT: HCPCS | Performed by: INTERNAL MEDICINE

## 2024-08-29 PROCEDURE — 99214 OFFICE O/P EST MOD 30 MIN: CPT | Performed by: INTERNAL MEDICINE

## 2024-08-29 PROCEDURE — 80053 COMPREHEN METABOLIC PANEL: CPT

## 2024-08-29 PROCEDURE — 85025 COMPLETE CBC W/AUTO DIFF WBC: CPT

## 2024-08-29 PROCEDURE — 36415 COLL VENOUS BLD VENIPUNCTURE: CPT

## 2024-08-29 PROCEDURE — 80061 LIPID PANEL: CPT

## 2024-08-29 NOTE — PROGRESS NOTES
Ambulatory Visit  Name: Carmelina Rodrigez      : 1975      MRN: [id number]  Encounter Provider: Gomez Paulino DO  Encounter Date: 2024   Encounter department: [facility]    Assessment & Plan   1. Major depression, recurrent, chronic (HCC)    Stable at this time. She has improved her lifestyle and lost weight through more walking. Continue to follow with psychiatry.    2. Anxiety with benzodiazepine dependence (HCC)    Stable and managed by psychiatry. She has decreased her use of xanax recently. She reassured about lump under her chin. Not concerning at this time and no evidence of infection. Likely related to her dental disease.    3. Mixed hyperlipidemia    Started on cholesterol medication back in . Cholesterol was over 300. She is on rosuvastatin 10 mg. Will check updated lipid panel to see the effect of statin and her lifestyle changes.    - Lipid Panel with Direct LDL reflex; Future  - CBC and differential; Future  - Comprehensive metabolic panel; Future    4. Medicare annual wellness visit, subsequent    5. Encounter for screening mammogram for breast cancer  - Mammo screening bilateral w 3d & cad; Future        Preventive health issues were discussed with patient, and age appropriate screening tests were ordered as noted in patient's After Visit Summary. Personalized health advice and appropriate referrals for health education or preventive services given if needed, as noted in patient's After Visit Summary.    History of Present Illness     History of Present Illness  The patient presents for follow-up and medicare wellness visit.    She reports feeling unwell with swollen glands, including one under her chin that has enlarged to the size of a golf ball. She mentions ongoing dental issues related to periodontal disease and recalls a previous episode of gland swelling years ago, which was biopsied and found to be benign. She expresses concern about a sore in her  mouth that developed after a dental visit, coinciding with the onset of the gland swelling. Additionally, she notes swelling across her neck following a dental cleaning and splits on both sides of her lip. She has been using Chapstick for relief and has started brushing her teeth with baking soda and peroxide, which she believes has reduced the swelling.    She denies experiencing cold-like symptoms but mentions feeling fatigued. She has lost nearly 30 pounds through daily walking and stretching exercises. She is currently on Crestor for cholesterol management. She is due for updated labs.       Patient Care Team:  Gomez Paulino DO as PCP - General (Internal Medicine)  Darby Mijares PA-C (Gastroenterology)    Review of Systems   HENT:  Positive for dental problem.         Swollen glands   Respiratory: Negative.     Cardiovascular: Negative.    Gastrointestinal: Negative.    Psychiatric/Behavioral:  Positive for behavioral problems. The patient is nervous/anxious.      Medical History Reviewed by provider this encounter:  Tobacco  Allergies  Meds  Problems  Med Hx  Surg Hx  Fam Hx       Annual Wellness Visit Questionnaire   Carmelina is here for her Subsequent Wellness visit. Last Medicare Wellness visit information reviewed, patient interviewed and updates made to the record today.      Health Risk Assessment:   Patient rates overall health as good. Patient feels that their physical health rating is same. Patient is satisfied with their life. Eyesight was rated as slightly worse. Hearing was rated as same. Patient feels that their emotional and mental health rating is same. Patients states they are sometimes angry. Patient states they are always unusually tired/fatigued. Pain experienced in the last 7 days has been a lot. Patient's pain rating has been 7/10. Patient states that she has experienced no weight loss or gain in last 6 months.     Depression Screening:   PHQ-9 Score: 11      Fall Risk Screening:    In the past year, patient has experienced: history of falling in past year    Number of falls: 1  Injured during fall?: No    Feels unsteady when standing or walking?: No    Worried about falling?: No      Urinary Incontinence Screening:   Patient has not leaked urine accidently in the last six months.     Home Safety:  Patient does not have trouble with stairs inside or outside of their home. Patient has working smoke alarms and has working carbon monoxide detector. Home safety hazards include: none.     Nutrition:   Current diet is Regular.     Medications:   Patient is currently taking over-the-counter supplements. OTC medications include: see medication list. Patient is able to manage medications.     Activities of Daily Living (ADLs)/Instrumental Activities of Daily Living (IADLs):   Walk and transfer into and out of bed and chair?: Yes  Dress and groom yourself?: Yes    Bathe or shower yourself?: Yes    Feed yourself? Yes  Do your laundry/housekeeping?: Yes  Manage your money, pay your bills and track your expenses?: Yes  Make your own meals?: Yes    Do your own shopping?: Yes    Previous Hospitalizations:   Any hospitalizations or ED visits within the last 12 months?: Yes    How many hospitalizations have you had in the last year?: 1-2    Advance Care Planning:   Living will: No    Durable POA for healthcare: No    Advanced directive: No    Five wishes given: No      Cognitive Screening:   Provider or family/friend/caregiver concerned regarding cognition?: No    PREVENTIVE SCREENINGS      Cardiovascular Screening:    General: Screening Not Indicated and History Lipid Disorder      Colorectal Cancer Screening:     General: Screening Current      Breast Cancer Screening:     General: Risks and Benefits Discussed    Due for: Mammogram        Cervical Cancer Screening:    General: Risks and Benefits Discussed    Due for: Cervical Pap Smear      Osteoporosis Screening:    General: Screening Not Indicated       Abdominal Aortic Aneurysm (AAA) Screening:        General: Screening Not Indicated      Lung Cancer Screening:     General: Screening Not Indicated      Hepatitis C Screening:    General: Screening Current    Screening, Brief Intervention, and Referral to Treatment (SBIRT)    Screening  Typical number of drinks in a day: 0  Typical number of drinks in a week: 0  Interpretation: Low risk drinking behavior.    AUDIT-C Screenin) How often did you have a drink containing alcohol in the past year? never  2) How many drinks did you have on a typical day when you were drinking in the past year? 0  3) How often did you have 6 or more drinks on one occasion in the past year? never    AUDIT-C Score: 0  Interpretation: Score 0-2 (female): Negative screen for alcohol misuse    Single Item Drug Screening:  How often have you used an illegal drug (including marijuana) or a prescription medication for non-medical reasons in the past year? never    Single Item Drug Screen Score: 0  Interpretation: Negative screen for possible drug use disorder    Brief Intervention  Alcohol & drug use screenings were reviewed. No concerns regarding substance use disorder identified.     Other Counseling Topics:   Car/seat belt/driving safety, skin self-exam, sunscreen and regular weightbearing exercise.     Social Determinants of Health     Food Insecurity: No Food Insecurity (2024)    Hunger Vital Sign     Worried About Running Out of Food in the Last Year: Never true     Ran Out of Food in the Last Year: Never true   Transportation Needs: No Transportation Needs (2024)    PRAPARE - Transportation     Lack of Transportation (Medical): No     Lack of Transportation (Non-Medical): No   Housing Stability: Low Risk  (2024)    Housing Stability Vital Sign     Unable to Pay for Housing in the Last Year: No     Number of Times Moved in the Last Year: 0     Homeless in the Last Year: No   Utilities: Not At Risk (2024)    [facility]  "Utilities     Threatened with loss of utilities: No     Objective     /74 (BP Location: Left arm, Patient Position: Sitting, Cuff Size: Standard)   Pulse 62   Temp (!) 96.4 °F (35.8 °C) (Tympanic)   Resp 18   Ht 5' 2.5\" (1.588 m)   Wt 65.6 kg (144 lb 9.6 oz)   SpO2 98%   BMI 26.03 kg/m²     Physical Exam  Constitutional:       General: She is not in acute distress.     Appearance: She is not ill-appearing.   Neck:      Comments: Small cystic lump under chin. No tenderness, erythema, warmth, induration. No enlargement of salivary glands. No cervical adenopathy.  Cardiovascular:      Rate and Rhythm: Normal rate and regular rhythm.      Heart sounds: No murmur heard.  Pulmonary:      Effort: Pulmonary effort is normal. No respiratory distress.      Breath sounds: No wheezing.   Abdominal:      General: Bowel sounds are normal. There is no distension.      Tenderness: There is no abdominal tenderness.      Hernia: No hernia is present.   Musculoskeletal:      Right lower leg: No edema.      Left lower leg: No edema.   Neurological:      Mental Status: She is alert.       Gomez Nothstein, DO     "

## 2024-09-09 DIAGNOSIS — E78.2 MIXED HYPERLIPIDEMIA: Chronic | ICD-10-CM

## 2024-09-09 RX ORDER — ROSUVASTATIN CALCIUM 10 MG/1
10 TABLET, COATED ORAL DAILY
Qty: 30 TABLET | Refills: 5 | Status: SHIPPED | OUTPATIENT
Start: 2024-09-09

## 2025-02-24 ENCOUNTER — TELEPHONE (OUTPATIENT)
Age: 50
End: 2025-02-24

## 2025-02-24 DIAGNOSIS — E55.9 VITAMIN D DEFICIENCY: ICD-10-CM

## 2025-02-24 DIAGNOSIS — E78.2 MIXED HYPERLIPIDEMIA: Primary | ICD-10-CM

## 2025-02-24 NOTE — TELEPHONE ENCOUNTER
Patient called, they would like to check if any labs were needed for their up coming appointment.     She has also been having some pain and discomfort on her left side, she did have a fall back in September and another one recently. She landed again on her left side. So she wanted to make her provider aware of this and didn't know if he wanted her to have it evaluated.     Patient would like a call back, please advise.    Thank you

## 2025-03-01 ENCOUNTER — APPOINTMENT (OUTPATIENT)
Dept: LAB | Facility: HOSPITAL | Age: 50
End: 2025-03-01
Payer: MEDICARE

## 2025-03-01 DIAGNOSIS — E78.2 MIXED HYPERLIPIDEMIA: ICD-10-CM

## 2025-03-01 DIAGNOSIS — E55.9 VITAMIN D DEFICIENCY: ICD-10-CM

## 2025-03-01 LAB
25(OH)D3 SERPL-MCNC: 55.5 NG/ML (ref 30–100)
ANION GAP SERPL CALCULATED.3IONS-SCNC: 6 MMOL/L (ref 4–13)
BUN SERPL-MCNC: 14 MG/DL (ref 5–25)
CALCIUM SERPL-MCNC: 9.8 MG/DL (ref 8.4–10.2)
CHLORIDE SERPL-SCNC: 103 MMOL/L (ref 96–108)
CHOLEST SERPL-MCNC: 201 MG/DL (ref ?–200)
CO2 SERPL-SCNC: 30 MMOL/L (ref 21–32)
CREAT SERPL-MCNC: 0.78 MG/DL (ref 0.6–1.3)
ERYTHROCYTE [DISTWIDTH] IN BLOOD BY AUTOMATED COUNT: 12 % (ref 11.6–15.1)
GFR SERPL CREATININE-BSD FRML MDRD: 88 ML/MIN/1.73SQ M
GLUCOSE P FAST SERPL-MCNC: 101 MG/DL (ref 65–99)
HCT VFR BLD AUTO: 41.4 % (ref 34.8–46.1)
HDLC SERPL-MCNC: 61 MG/DL
HGB BLD-MCNC: 13.3 G/DL (ref 11.5–15.4)
LDLC SERPL CALC-MCNC: 109 MG/DL (ref 0–100)
MCH RBC QN AUTO: 31.3 PG (ref 26.8–34.3)
MCHC RBC AUTO-ENTMCNC: 32.1 G/DL (ref 31.4–37.4)
MCV RBC AUTO: 97 FL (ref 82–98)
PLATELET # BLD AUTO: 337 THOUSANDS/UL (ref 149–390)
PMV BLD AUTO: 10.7 FL (ref 8.9–12.7)
POTASSIUM SERPL-SCNC: 4.3 MMOL/L (ref 3.5–5.3)
RBC # BLD AUTO: 4.25 MILLION/UL (ref 3.81–5.12)
SODIUM SERPL-SCNC: 139 MMOL/L (ref 135–147)
TRIGL SERPL-MCNC: 157 MG/DL (ref ?–150)
WBC # BLD AUTO: 12.1 THOUSAND/UL (ref 4.31–10.16)

## 2025-03-01 PROCEDURE — 82306 VITAMIN D 25 HYDROXY: CPT

## 2025-03-01 PROCEDURE — 80061 LIPID PANEL: CPT

## 2025-03-01 PROCEDURE — 80048 BASIC METABOLIC PNL TOTAL CA: CPT

## 2025-03-01 PROCEDURE — 36415 COLL VENOUS BLD VENIPUNCTURE: CPT

## 2025-03-01 PROCEDURE — 85027 COMPLETE CBC AUTOMATED: CPT

## 2025-03-03 ENCOUNTER — APPOINTMENT (OUTPATIENT)
Age: 50
End: 2025-03-03
Payer: MEDICARE

## 2025-03-03 ENCOUNTER — OFFICE VISIT (OUTPATIENT)
Age: 50
End: 2025-03-03
Payer: MEDICARE

## 2025-03-03 VITALS
BODY MASS INDEX: 24.17 KG/M2 | WEIGHT: 136.4 LBS | HEART RATE: 83 BPM | OXYGEN SATURATION: 98 % | DIASTOLIC BLOOD PRESSURE: 72 MMHG | HEIGHT: 63 IN | RESPIRATION RATE: 18 BRPM | TEMPERATURE: 97.7 F | SYSTOLIC BLOOD PRESSURE: 114 MMHG

## 2025-03-03 DIAGNOSIS — R20.2 LEFT LEG PARESTHESIAS: ICD-10-CM

## 2025-03-03 DIAGNOSIS — H92.01 RIGHT EAR PAIN: ICD-10-CM

## 2025-03-03 DIAGNOSIS — F13.20 BENZODIAZEPINE DEPENDENCE (HCC): ICD-10-CM

## 2025-03-03 DIAGNOSIS — H53.9 VISUAL DISTURBANCES: ICD-10-CM

## 2025-03-03 DIAGNOSIS — R53.1 LEFT-SIDED WEAKNESS: ICD-10-CM

## 2025-03-03 DIAGNOSIS — F33.9 MAJOR DEPRESSION, RECURRENT, CHRONIC (HCC): Primary | Chronic | ICD-10-CM

## 2025-03-03 DIAGNOSIS — R10.9 LEFT FLANK PAIN: ICD-10-CM

## 2025-03-03 DIAGNOSIS — Z12.31 ENCOUNTER FOR SCREENING MAMMOGRAM FOR BREAST CANCER: ICD-10-CM

## 2025-03-03 DIAGNOSIS — R26.89 BALANCE PROBLEM: ICD-10-CM

## 2025-03-03 DIAGNOSIS — E78.2 MIXED HYPERLIPIDEMIA: Chronic | ICD-10-CM

## 2025-03-03 PROBLEM — S42.031D DISPLACED FRACTURE OF LATERAL END OF RIGHT CLAVICLE WITH ROUTINE HEALING: Status: RESOLVED | Noted: 2021-07-10 | Resolved: 2025-03-03

## 2025-03-03 PROCEDURE — 99214 OFFICE O/P EST MOD 30 MIN: CPT | Performed by: INTERNAL MEDICINE

## 2025-03-03 PROCEDURE — G2211 COMPLEX E/M VISIT ADD ON: HCPCS | Performed by: INTERNAL MEDICINE

## 2025-03-03 PROCEDURE — 72072 X-RAY EXAM THORAC SPINE 3VWS: CPT

## 2025-03-03 RX ORDER — NEOMYCIN SULFATE, POLYMYXIN B SULFATE, HYDROCORTISONE 3.5; 10000; 1 MG/ML; [USP'U]/ML; MG/ML
4 SOLUTION/ DROPS AURICULAR (OTIC) EVERY 6 HOURS SCHEDULED
Qty: 10 ML | Refills: 0 | Status: SHIPPED | OUTPATIENT
Start: 2025-03-03

## 2025-03-03 NOTE — ASSESSMENT & PLAN NOTE
Patient's depression screening was positive with a PHQ-9 score of 15. Patient with underlying depression and was advised to continue current medications as prescribed. Continue regular follow-up with their psychologist/therapist/psychiatrist who is managing their mental health condition(s).

## 2025-03-03 NOTE — PROGRESS NOTES
Name: Carmelina Rodrigez      : 1975      MRN: [id number]  Encounter Provider: Gomez Paulino DO  Encounter Date: 3/3/2025   Encounter department: [facility] [place]  :  Assessment & Plan  Major depression, recurrent, chronic (HCC)    Patient's depression screening was positive with a PHQ-9 score of 15. Patient with underlying depression and was advised to continue current medications as prescribed. Continue regular follow-up with their psychologist/therapist/psychiatrist who is managing their mental health condition(s).    Anxiety with benzodiazepine dependence (HCC)    Stable and follows with psychiatry. She is prescribed alprazolam. [place] PDMP was reviewed.    Balance problem    She has family history of MS. Has been falling a lot recently. Has issues with her gait/balance. Notes weakness of her left side and intermittent paresthesia. She also deals with fatigue, mood changes. Check MRI brain MS protocol.    Orders:  •  MRI brain MS wo contrast; Future    Left-sided weakness    Orders:  •  MRI brain MS wo contrast; Future    Visual disturbances    Orders:  •  MRI brain MS wo contrast; Future    Left leg paresthesias    Orders:  •  MRI brain MS wo contrast; Future    Mixed hyperlipidemia    Stable and work on heart healthy diet. Continue statin therapy.    Encounter for screening mammogram for breast cancer    Orders:  •  Mammo screening bilateral w 3d and cad; Future    Right ear pain    Orders:  •  neomycin-polymyxin-hydrocortisone (CORTISPORIN) otic solution; Administer 4 drops to the right ear every 6 (six) hours    Left flank pain    Orders:  •  XR spine thoracic 3 vw; Future      History of Present Illness   Carmelina presents for follow up with labs. Has had several falls in the past couple months. Back in September she fell down stairs and struck her back and tailbone.     Right ear pain started a few days ago.      Review of Systems   Constitutional:  Positive for fatigue.   HENT:   "Positive for ear pain.    Respiratory: Negative.     Cardiovascular: Negative.    Gastrointestinal: Negative.    Musculoskeletal:  Positive for arthralgias and back pain.   Neurological:  Positive for weakness and numbness.   Psychiatric/Behavioral:  Positive for behavioral problems. The patient is nervous/anxious.        Objective   /72 (BP Location: Left arm, Patient Position: Sitting, Cuff Size: Standard)   Pulse 83   Temp 97.7 °F (36.5 °C) (Tympanic)   Resp 18   Ht 5' 2.5\" (1.588 m)   Wt 61.9 kg (136 lb 6.4 oz)   SpO2 98%   BMI 24.55 kg/m²      Physical Exam  Constitutional:       General: She is not in acute distress.     Appearance: She is not ill-appearing.   HENT:      Ears:      Comments: Right ear canal swelling and erythema  Cardiovascular:      Rate and Rhythm: Normal rate and regular rhythm.      Heart sounds: No murmur heard.  Pulmonary:      Effort: Pulmonary effort is normal. No respiratory distress.      Breath sounds: No wheezing.   Abdominal:      General: Bowel sounds are normal. There is no distension.      Tenderness: There is no abdominal tenderness.   Musculoskeletal:         General: Tenderness (over left thoracic paraspinal muscles) present.      Right lower leg: No edema.      Left lower leg: No edema.   Neurological:      Mental Status: She is alert.      Gait: Gait normal.   Psychiatric:         Mood and Affect: Mood normal.         Behavior: Behavior normal.       Gomez Paulino DO       "

## 2025-03-05 ENCOUNTER — RESULTS FOLLOW-UP (OUTPATIENT)
Age: 50
End: 2025-03-05

## 2025-03-16 DIAGNOSIS — E78.2 MIXED HYPERLIPIDEMIA: Chronic | ICD-10-CM

## 2025-03-16 RX ORDER — ROSUVASTATIN CALCIUM 10 MG/1
10 TABLET, COATED ORAL DAILY
Qty: 30 TABLET | Refills: 0 | Status: SHIPPED | OUTPATIENT
Start: 2025-03-16

## 2025-04-18 DIAGNOSIS — E78.2 MIXED HYPERLIPIDEMIA: Chronic | ICD-10-CM

## 2025-04-18 RX ORDER — ROSUVASTATIN CALCIUM 10 MG/1
10 TABLET, COATED ORAL DAILY
Qty: 30 TABLET | Refills: 0 | Status: SHIPPED | OUTPATIENT
Start: 2025-04-18

## 2025-05-26 DIAGNOSIS — E78.2 MIXED HYPERLIPIDEMIA: Chronic | ICD-10-CM

## 2025-05-27 RX ORDER — ROSUVASTATIN CALCIUM 10 MG/1
10 TABLET, COATED ORAL DAILY
Qty: 30 TABLET | Refills: 5 | Status: SHIPPED | OUTPATIENT
Start: 2025-05-27

## 2025-06-13 ENCOUNTER — OFFICE VISIT (OUTPATIENT)
Age: 50
End: 2025-06-13
Payer: MEDICARE

## 2025-06-13 ENCOUNTER — APPOINTMENT (OUTPATIENT)
Age: 50
End: 2025-06-13
Payer: COMMERCIAL

## 2025-06-13 VITALS
SYSTOLIC BLOOD PRESSURE: 116 MMHG | TEMPERATURE: 97.9 F | OXYGEN SATURATION: 97 % | DIASTOLIC BLOOD PRESSURE: 70 MMHG | WEIGHT: 137.8 LBS | RESPIRATION RATE: 18 BRPM | BODY MASS INDEX: 24.41 KG/M2 | HEART RATE: 87 BPM | HEIGHT: 63 IN

## 2025-06-13 DIAGNOSIS — M54.16 LUMBAR RADICULOPATHY: Primary | ICD-10-CM

## 2025-06-13 DIAGNOSIS — M54.16 LUMBAR RADICULOPATHY: ICD-10-CM

## 2025-06-13 PROCEDURE — 99214 OFFICE O/P EST MOD 30 MIN: CPT | Performed by: INTERNAL MEDICINE

## 2025-06-13 PROCEDURE — 72110 X-RAY EXAM L-2 SPINE 4/>VWS: CPT

## 2025-06-13 PROCEDURE — G2211 COMPLEX E/M VISIT ADD ON: HCPCS | Performed by: INTERNAL MEDICINE

## 2025-06-13 RX ORDER — METHOCARBAMOL 500 MG/1
500 TABLET, FILM COATED ORAL 3 TIMES DAILY PRN
Qty: 60 TABLET | Refills: 0 | Status: SHIPPED | OUTPATIENT
Start: 2025-06-13

## 2025-06-13 RX ORDER — GABAPENTIN 300 MG/1
300 CAPSULE ORAL
Qty: 30 CAPSULE | Refills: 3 | Status: SHIPPED | OUTPATIENT
Start: 2025-06-13

## 2025-06-13 RX ORDER — PREDNISONE 20 MG/1
40 TABLET ORAL DAILY
Qty: 10 TABLET | Refills: 0 | Status: SHIPPED | OUTPATIENT
Start: 2025-06-13 | End: 2025-06-18

## 2025-06-13 NOTE — PROGRESS NOTES
Name: Carmelina Rodrigez      : 1975      MRN: [id number]  Encounter Provider: Gomez Paulino DO  Encounter Date: 2025   Encounter department: [facility] [place]    :  Assessment & Plan  Lumbar radiculopathy    She appears to be in a good deal of pain. She has a history of lumbar radiculopathy and prior MRI showed evidence of disc herniation. Will prescribe medications as below. She has weakness of the left lower extremity. Check x-ray and MRI of the lumbar spine. If pain becomes unbearable, then recommend ED evaluation.     Orders:  •  methocarbamol (ROBAXIN) 500 mg tablet; Take 1 tablet (500 mg total) by mouth 3 (three) times a day as needed for muscle spasms  •  predniSONE 20 mg tablet; Take 2 tablets (40 mg total) by mouth daily for 5 days  •  diclofenac sodium (VOLTAREN) 50 mg EC tablet; Take 1 tablet (50 mg total) by mouth 2 (two) times a day  •  XR spine lumbar minimum 4 views non injury; Future  •  MRI lumbar spine wo contrast; Future  •  gabapentin (NEURONTIN) 300 mg capsule; Take 1 capsule (300 mg total) by mouth daily at bedtime        Tobacco Cessation Counseling: Tobacco cessation counseling was provided. The patient is sincerely urged to quit consumption of tobacco. She is not ready to quit tobacco.         History of Present Illness     History of Present Illness  The patient presents for evaluation of severe sciatica.    She reports experiencing severe sciatica, initially localized to the left side but has since escalated to encompass her entire hip, groin, and leg. This has resulted in significant weakness in her leg, rendering her unable to bear weight on it. The pain is so intense that it disrupts her sleep and prevents her from lying on her side or sitting comfortably. She describes the pain as originating from her tailbone, with sharp, shooting sensations radiating up her spine. Despite attempts to manage the pain with ibuprofen, she found no relief even after  "increasing the dosage to 4 mg. She has been performing self-massage and applying ice packs to the affected area, but these measures have not alleviated her symptoms. She has a history of disk herniation.     Review of Systems   Constitutional: Negative.    Respiratory: Negative.     Cardiovascular: Negative.    Musculoskeletal:  Positive for arthralgias, back pain and gait problem.   Neurological:  Positive for numbness.     Objective   /70 (BP Location: Left arm, Patient Position: Sitting, Cuff Size: Standard)   Pulse 87   Temp 97.9 °F (36.6 °C) (Tympanic)   Resp 18   Ht 5' 2.5\" (1.588 m)   Wt 62.5 kg (137 lb 12.8 oz)   SpO2 97%   BMI 24.80 kg/m²     Physical Exam  Constitutional:       General: She is not in acute distress.     Appearance: She is not ill-appearing.     Cardiovascular:      Rate and Rhythm: Normal rate and regular rhythm.      Heart sounds: No murmur heard.  Pulmonary:      Effort: Pulmonary effort is normal. No respiratory distress.      Breath sounds: No wheezing.   Abdominal:      General: Bowel sounds are normal.     Musculoskeletal:         General: Tenderness (left lumbar paraspinal muscles) and deformity (left lumbar tissue hypertonicity) present.      Right lower leg: No edema.      Left lower leg: No edema.     Neurological:      Mental Status: She is alert.      Sensory: Sensory deficit (LLE) present.      Motor: Weakness (LLE) present.       Gomez Paulino DO   "

## 2025-06-13 NOTE — ASSESSMENT & PLAN NOTE
She appears to be in a good deal of pain. She has a history of lumbar radiculopathy and prior MRI showed evidence of disc herniation. Will prescribe medications as below. She has weakness of the left lower extremity. Check x-ray and MRI of the lumbar spine. If pain becomes unbearable, then recommend ED evaluation.     Orders:  •  methocarbamol (ROBAXIN) 500 mg tablet; Take 1 tablet (500 mg total) by mouth 3 (three) times a day as needed for muscle spasms  •  predniSONE 20 mg tablet; Take 2 tablets (40 mg total) by mouth daily for 5 days  •  diclofenac sodium (VOLTAREN) 50 mg EC tablet; Take 1 tablet (50 mg total) by mouth 2 (two) times a day  •  XR spine lumbar minimum 4 views non injury; Future  •  MRI lumbar spine wo contrast; Future  •  gabapentin (NEURONTIN) 300 mg capsule; Take 1 capsule (300 mg total) by mouth daily at bedtime

## 2025-06-16 ENCOUNTER — RESULTS FOLLOW-UP (OUTPATIENT)
Age: 50
End: 2025-06-16

## 2025-06-18 ENCOUNTER — HOSPITAL ENCOUNTER (OUTPATIENT)
Dept: MRI IMAGING | Facility: CLINIC | Age: 50
Discharge: HOME | End: 2025-06-18
Attending: INTERNAL MEDICINE
Payer: MEDICARE

## 2025-06-18 ENCOUNTER — HOSPITAL ENCOUNTER (OUTPATIENT)
Age: 50
Discharge: HOME | End: 2025-06-18
Attending: INTERNAL MEDICINE
Payer: MEDICARE

## 2025-06-18 VITALS — BODY MASS INDEX: 24.8 KG/M2 | HEIGHT: 63 IN

## 2025-06-18 DIAGNOSIS — Z12.31 ENCOUNTER FOR SCREENING MAMMOGRAM FOR BREAST CANCER: ICD-10-CM

## 2025-06-18 DIAGNOSIS — M54.16 LUMBAR RADICULOPATHY: ICD-10-CM

## 2025-06-18 PROCEDURE — 72148 MRI LUMBAR SPINE W/O DYE: CPT

## 2025-06-18 PROCEDURE — 77067 SCR MAMMO BI INCL CAD: CPT

## 2025-06-18 PROCEDURE — 77063 BREAST TOMOSYNTHESIS BI: CPT

## 2025-06-19 ENCOUNTER — TELEPHONE (OUTPATIENT)
Age: 50
End: 2025-06-19

## 2025-06-19 NOTE — TELEPHONE ENCOUNTER
Caller: Carmelina     Doctor: Dr. Carr     Reason for call: Please mail new patient paperwork to address on file. Thank you     Call back#: [phone]

## 2025-07-14 DIAGNOSIS — M54.16 LUMBAR RADICULOPATHY: ICD-10-CM

## 2025-07-21 ENCOUNTER — CONSULT (OUTPATIENT)
Dept: PAIN MEDICINE | Facility: CLINIC | Age: 50
End: 2025-07-21
Attending: INTERNAL MEDICINE
Payer: MEDICARE

## 2025-07-21 ENCOUNTER — PATIENT MESSAGE (OUTPATIENT)
Dept: PAIN MEDICINE | Facility: CLINIC | Age: 50
End: 2025-07-21

## 2025-07-21 VITALS — HEIGHT: 62 IN | WEIGHT: 143 LBS | BODY MASS INDEX: 26.31 KG/M2

## 2025-07-21 DIAGNOSIS — M51.16 LUMBAR DISC HERNIATION WITH RADICULOPATHY: ICD-10-CM

## 2025-07-21 PROCEDURE — 99214 OFFICE O/P EST MOD 30 MIN: CPT | Performed by: STUDENT IN AN ORGANIZED HEALTH CARE EDUCATION/TRAINING PROGRAM

## 2025-07-21 NOTE — PROGRESS NOTES
Name: Carmelina Rodrigez      : 1975      MRN: [id number]  Encounter Provider: Joe Carr MD  Encounter Date: 2025   Encounter department: [facility] [place]  :  Assessment & Plan  Lumbar disc herniation with radiculopathy  49F with left lower back pain with radiation down the leg. She has notable disc herniation at L4-5 with left lateral recess stenosis as well as severe left L4-5 foraminal narrowing causing pain and focal weakness in the left lower extremity.  She has weakness with left hip flexion and left foot dorsiflexion.  I discussed her MRI in detail and explained the degree of nerve compression and subsequent weakness in her lower extremity.  We discussed starting with a conservative management plan in terms of  aquatic therapy as well as left parasagittal L4-5 LESI.  Previously she did not get much relief with L4 and L5 TFESI.    She was advised after injection to establish care with Dr. Howard.  The reason for the referral is due to focal weakness in left lower extremity.  I did briefly discuss common spine surgical approaches to address the type of issue that she has.  She will obviously discuss more detail with him.    Orders:    Ambulatory referral to Spine & Pain Management    [place] spine and pain procedure; Future    Ambulatory referral to Orthopedic Surgery; Future    Ambulatory referral to Physical Therapy; Future      My impressions and treatment recommendations were discussed in detail with the patient who verbalized understanding and had no further questions.  Discharge instructions were provided. I personally saw and examined the patient and I agree with the above discussed plan of care.    History of Present Illness     Carmelina Rodrigez is a 49 y.o. female with mid to left lumbosacral pain into the buttocks into the left hip, groin and leg.  Radiates into the left anterior thigh as well as into the posterior thigh and into the lateral calf region.   "Ongoing for the last 4+ months.  Pain is severe over the past month.  5 out of 10.  Constant.  Cramping, shooting, sharp, cutting, throbbing.    Pain is increased with bending, sitting, walking, exercise, relaxation, coughing.    She saw me once in 2021.  Then she transferred care to Dr. Ro as she was not comfortable seeing me again.  She is agreeable to seeing me again today.    She had left L4 and L5 TFESI x 2 with Dr. Ro with minimal relief.    Today she reports most     Review of Systems   Musculoskeletal:  Positive for arthralgias, back pain, gait problem, joint swelling and myalgias.   Neurological:  Positive for weakness.     Medical History Reviewed by provider this encounter:     .     Objective   Ht 5' 2\" (1.575 m)   Wt 64.9 kg (143 lb)   LMP 03/01/2023 (Approximate)   BMI 26.16 kg/m²      Pain Score:   6  Physical Exam  Constitutional: normal, well developed, well nourished, alert, in no distress and non-toxic and no overt pain behavior.  Eyes: anicteric  HEENT: grossly intact  Neck: supple, symmetric, trachea midline and no masses   Pulmonary: even and unlabored  Cardiovascular: No edema or pitting edema present  Skin: Normal without rashes or lesions and well hydrated  Psychiatric: Mood and affect appropriate  Neurologic: Cranial Nerves II-XII grossly intact  Musculoskeletal: normal    Lumbar Spine Exam    Appearance:  Normal lordosis  Motor Strength:  Left hip flexion:  4/5  Left hip extension:  5/5  Right hip flexion:  5/5  Right hip extension:  5/5  Left knee flexion:  5/5  Left knee extension:  5/5  Right knee flexion:  5/5  Right knee extension:  5/5  Left foot dorsiflexion:  4/5  Left foot plantar flexion:  5/5  Right foot dorsiflexion:  5/5  Right foot plantar flexion:  5/5  Reflexes:  Left Patellar:  1+   Right Patellar:  1+   Left Achilles:  1+   Right Achilles:  1+       MRI lumbar spine wo contrast  Status: Final result     PACS Images - [facility]     Show images for MRI lumbar spine wo " contrast  PACS Images - [facility]     Show images for MRI lumbar spine wo contrast    MRI lumbar spine wo contrast: Result Notes     Gomez Paulino DO  6/18/2025  4:29 PM EDT       She has a significant disc herniation which is causing pressure on her L4/L5 nerve giving her symptoms of sciatica. Recommend referral to spine & pain which I have placed.            Study Result    Narrative & Impression   MRI LUMBAR SPINE WITHOUT CONTRAST     INDICATION: M54.16: Radiculopathy, lumbar region.   Low back pain radiating into the left leg.     COMPARISON: Lumbar spine radiographs 6/13/2023     TECHNIQUE:  Multiplanar, multisequence imaging of the lumbar spine was performed. .  Imaging performed on 1.5T MRI     IMAGE QUALITY:  Diagnostic     FINDINGS:     VERTEBRAL BODIES:  There are 5 lumbar type vertebral bodies. Mild levoscoliotic curvature in the mid lumbar spine with a compensatory dextroscoliotic curvature in the lower lumbar spine. Grade 1 retrolisthesis of L3 on L4 and L4 on L5. Type I Modic   endplate changes at L4-L5. Otherwise, normal marrow signal without discrete marrow lesion.     SACRUM:  Normal signal within the sacrum. No evidence of insufficiency or stress fracture.     DISTAL CORD AND CONUS:  Normal size and signal within the distal cord and conus. The conus terminates at the L1 level.  The cauda equina nerve roots appear within normal limits.     PARASPINAL SOFT TISSUES:  Paraspinal soft tissues are unremarkable.     LOWER THORACIC DISC SPACES:  Normal disc height and signal.  No disc herniation, canal stenosis or foraminal narrowing.     LUMBAR DISC SPACES: Multilevel disc height loss, most notably at L4-L5     L1-2: Mild anterior and far lateral disc bulging. No spinal canal or foraminal stenosis.     L2-3: Diffuse disc bulge with superimposed left foraminal protrusion.  No central canal or  subarticular/lateral recess narrowing. Mild left neural foraminal stenosis. Patent right neural foramen.     L3-4:  Circumferential disc bulge..  No central canal or  subarticular/lateral recess narrowing. Mild bilateral inferior foraminal stenosis.     L4-5: Disc bulge with a superimposed central protrusion and asymmetric soft tissue fullness in the left lateral recess and extending into the left lateral epidural space. This is concerning for a prominent disc herniation, likely impacting the traversing   left L5 nerve root. There is also asymmetric soft tissue fullness in the left neural foramina where additional herniated disc material is likely extending to. Complete effacement of the left neural foramina is noted. Correlate for ipsilateral L4 radiculitis.     L5-S1: Diffuse disc bulge with tiny superimposed central disc protrusion. Mild bilateral facet arthropathy. No central canal or  subarticular/lateral recess narrowing.  No neural foraminal stenosis.     OTHER FINDINGS:  None.     IMPRESSION:  Mild scoliosis and mild spondylolisthesis.     Multilevel lumbar spondylosis, most notably at L4-L5 where there is a large left lateral recess and foraminal disc herniation resulting in severe left lateral recess and foraminal stenosis, correlate for ipsilateral L4 and L5 radiculitis.           Resident: TAVO MURRAY I, the attending radiologist, have reviewed the images and agree with the final report above.     Workstation performed: [id number]

## 2025-07-21 NOTE — PATIENT INSTRUCTIONS
"Patient Education     Epidural injection   The Basics   Written by the doctors and editors at [facility]   What is an epidural injection? -- An epidural injection can be used to treat a condition called \"radiculopathy.\" This is the medical term for the pain, weakness, numbness, or tingling that happens when nerves coming from the spinal cord get pinched or damaged.  The doctor injects medicines into the space outside the covering of the spinal cord (figure 1). This is similar to an \"epidural\" that is used for pain relief during labor and childbirth.  Epidural injections can be given into different parts of your back:   Cervical epidural injection - Used to help with pain in the head or arms.   Thoracic epidural injection - Used to help with pain in the upper or middle back.   Lumbar epidural injection - Used to help with pain in the lower back or legs.  How do I prepare for an epidural injection? -- The doctor or nurse will tell you if you need to do anything special to prepare. Before your procedure, your doctor will do an exam. They might send you to get tests, such as:   X-ray, ultrasound, or other imaging tests - Imaging tests create pictures of the inside of the body.  Your doctor will also ask you about your \"health history.\" This involves asking you questions about any health problems you have or had in the past, past surgeries, and any medicines you take. Tell them about:   Any medicines you are taking - This includes any prescription or \"over-the-counter\" medicines you use, plus any herbal supplements you take. It helps to write down and bring a list of any medicines you take, or bring a bag with all of your medicines with you.   Any allergies you have   Any bleeding problems you have - Certain medicines, including some herbs and supplements, can increase the risk of bleeding. Some health conditions also increase this risk.  You will also get information about:   Eating and drinking before your procedure - In " "some cases, you might need to \"fast\" before surgery. This means not eating or drinking anything for a period of time. In other cases, you might be allowed to have liquids until a short time before the procedure. Whether you need to fast, and for how long, depends on the procedure you are having.   What help you will need when you go home - For example, you might need to have someone else bring you home or stay with you for some time while you recover.  Ask the doctor or nurse if you have questions or if there is anything you do not understand.  What happens during an epidural injection? -- When it is time for the procedure:   You might get an \"IV,\" which is a thin tube that goes into a vein. This can be used to give you fluids and medicines.   You will get anesthesia medicines to numb the area where the doctor will give the injection. This is to make sure that you do not feel pain during the procedure. You might also get medicines to make you relax and feel sleepy, called \"sedatives.\"   The doctors and nurses will monitor your breathing, blood pressure, and heart rate during the procedure.   The doctor might use a continuous X-ray called \"fluoroscopy.\" This is to help make sure that the medicines are injected into the right place. The doctor might also inject a dye to see where to give the medicine.   The doctor will place a needle through your skin and inject the medicine into a space near your spine. Then, they will remove the needle and cover the area with a clean bandage.   The procedure takes 15 to 30 minutes.  What happens after an epidural injection? -- After your procedure, the staff will watch you closely for a short time. It might take a few days before you feel the effects of the epidural injection.  Before you go home, make sure that you know what problems to look out for and when to call the doctor. Make sure that you understand your doctor's or nurse's instructions. Ask questions about anything you do " "not understand.  For the rest of the day after your procedure:   Try to rest. Limit activities like exercise or driving.   The doctor might recommend an over-the-counter pain medicine. These include acetaminophen (sample brand name: Tylenol), ibuprofen (sample brand names: Advil, Motrin), and naproxen (sample brand name: Aleve).   Ice can help with pain and swelling. Put a cold gel pack, bag of ice, or bag of frozen vegetables on the injection site area every 1 to 2 hours, for 15 minutes each time. Put a thin towel between the ice (or other cold object) and the skin.  What are the risks of an epidural injection? -- Your doctor will talk to you about all of the possible risks, and answer your questions. Possible risks include:   Bleeding   Infection   Headache   Nerve injury  When should I call the doctor? -- Call for emergency help right away (in the [place] and [place], call 9-1-1) if:   You can't move your arms or legs.  Call for advice if:   You have a fever of 100.4°F (38°C) or higher, or chills.   You have redness or swelling around the injection site.   You have a headache.   Your arms or legs are numb, weak, or tingly.  All topics are updated as new evidence becomes available and our peer review process is complete.  This topic retrieved from [employer] on: May 15, 2024.  Topic 739229 Version 1.0  Release: 32.4.3 - C32.134  © 2024 UpToDate, Inc. and/or its affiliates. All rights reserved.  figure 1: Epidural injection     Duringan epidural injection, the doctor inserts a needle between 2 of the bones thatmake up the spine. Then, they inject medicines into the area around the spinalcord. This is an illustration of a \"lumbar\" epidural injection, which is given into the low back. The doctor can place the needle in other areas to treat other types of pain.  Graphic 054454 Version 1.0  Consumer Information Use and Disclaimer   Disclaimer: This generalized information is a limited summary of diagnosis, treatment, and/or " medication information. It is not meant to be comprehensive and should be used as a tool to help the user understand and/or assess potential diagnostic and treatment options. It does NOT include all information about conditions, treatments, medications, side effects, or risks that may apply to a specific patient. It is not intended to be medical advice or a substitute for the medical advice, diagnosis, or treatment of a health care provider based on the health care provider's examination and assessment of a patient's specific and unique circumstances. Patients must speak with a health care provider for complete information about their health, medical questions, and treatment options, including any risks or benefits regarding use of medications. This information does not endorse any treatments or medications as safe, effective, or approved for treating a specific patient. UpToDate, Inc. and its affiliates disclaim any warranty or liability relating to this information or the use thereof.The use of this information is governed by the Terms of Use, available at https://www.[employer].com/en/know/clinical-effectiveness-terms. 2024© UpToDate, Inc. and its affiliates and/or licensors. All rights reserved.  Copyright   © 2024 UpToDate, Inc. and/or its affiliates. All rights reserved.

## 2025-07-21 NOTE — PATIENT COMMUNICATION
Pt is scheduled for LESI with Dr Carr on 7/24/25    Pt is not diabetic and reports she does not take prescription blood thinners or aspirin    Pt given instructions in office and via myc message    Have you completed PT/HEP/Chiro in the past 6 months for dedicated area? Pt given AT referral on 7/21/25 - has had previous injections for pain relief  If yes, how long did you complete?  What was the frequency?  Did it provide relief?  If no, reason therapy was not completed?

## 2025-07-24 ENCOUNTER — TELEPHONE (OUTPATIENT)
Age: 50
End: 2025-07-24

## 2025-07-24 NOTE — TELEPHONE ENCOUNTER
Caller: pt    Doctor: Dr. suggs    Reason for call: pt needs to r/s for today.    Call back#: [phone]

## 2025-08-01 NOTE — TELEPHONE ENCOUNTER
Caller: Carmelina     Doctor/Office: Dr Carr    Call regarding :  procedure  appt.    Call was transferred to:

## 2025-08-12 ENCOUNTER — HOSPITAL ENCOUNTER (OUTPATIENT)
Dept: RADIOLOGY | Facility: CLINIC | Age: 50
Discharge: HOME | End: 2025-08-12
Attending: STUDENT IN AN ORGANIZED HEALTH CARE EDUCATION/TRAINING PROGRAM
Payer: MEDICARE